# Patient Record
Sex: FEMALE | Race: WHITE | NOT HISPANIC OR LATINO | Employment: FULL TIME | URBAN - METROPOLITAN AREA
[De-identification: names, ages, dates, MRNs, and addresses within clinical notes are randomized per-mention and may not be internally consistent; named-entity substitution may affect disease eponyms.]

---

## 2018-10-09 ENCOUNTER — APPOINTMENT (OUTPATIENT)
Dept: RADIOLOGY | Age: 38
End: 2018-10-09
Payer: COMMERCIAL

## 2018-10-09 ENCOUNTER — TRANSCRIBE ORDERS (OUTPATIENT)
Dept: ADMINISTRATIVE | Age: 38
End: 2018-10-09

## 2018-10-09 DIAGNOSIS — R05.9 COUGH: ICD-10-CM

## 2018-10-09 DIAGNOSIS — R50.9 HYPERTHERMIA-INDUCED DEFECT: ICD-10-CM

## 2018-10-09 DIAGNOSIS — R50.9 HYPERTHERMIA-INDUCED DEFECT: Primary | ICD-10-CM

## 2018-10-09 PROCEDURE — 71046 X-RAY EXAM CHEST 2 VIEWS: CPT

## 2019-04-16 ENCOUNTER — HOSPITAL ENCOUNTER (EMERGENCY)
Facility: HOSPITAL | Age: 39
Discharge: HOME | End: 2019-04-16
Attending: EMERGENCY MEDICINE | Admitting: EMERGENCY MEDICINE
Payer: OTHER MISCELLANEOUS

## 2019-04-16 VITALS
BODY MASS INDEX: 39.06 KG/M2 | HEART RATE: 89 BPM | WEIGHT: 200 LBS | RESPIRATION RATE: 16 BRPM | DIASTOLIC BLOOD PRESSURE: 82 MMHG | SYSTOLIC BLOOD PRESSURE: 130 MMHG | OXYGEN SATURATION: 99 % | TEMPERATURE: 98 F

## 2019-04-16 DIAGNOSIS — S39.012A LUMBAR STRAIN, INITIAL ENCOUNTER: ICD-10-CM
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
## 2019-04-16 DIAGNOSIS — S16.1XXA STRAIN OF NECK MUSCLE, INITIAL ENCOUNTER: ICD-10-CM

## 2019-04-16 PROCEDURE — 99283 EMERGENCY DEPT VISIT LOW MDM: CPT

## 2019-04-16 PROCEDURE — 99282 EMERGENCY DEPT VISIT SF MDM: CPT | Performed by: EMERGENCY MEDICINE

## 2019-05-26 ENCOUNTER — HOSPITAL ENCOUNTER (EMERGENCY)
Facility: HOSPITAL | Age: 39
Discharge: HOME | End: 2019-05-26
Attending: EMERGENCY MEDICINE | Admitting: EMERGENCY MEDICINE
Payer: COMMERCIAL

## 2019-05-26 VITALS
OXYGEN SATURATION: 98 % | SYSTOLIC BLOOD PRESSURE: 116 MMHG | TEMPERATURE: 98.1 F | RESPIRATION RATE: 16 BRPM | DIASTOLIC BLOOD PRESSURE: 64 MMHG | HEART RATE: 78 BPM

## 2019-05-26 DIAGNOSIS — K31.84 GASTROPARESIS: Primary | ICD-10-CM

## 2019-05-26 DIAGNOSIS — R11.2 NAUSEA & VOMITING: ICD-10-CM

## 2019-05-26 LAB
ALBUMIN SERPL BCP-MCNC: 3.2 G/DL (ref 3.5–5)
ALP SERPL-CCNC: 94 U/L (ref 46–116)
ALT SERPL W P-5'-P-CCNC: 20 U/L (ref 12–78)
ANION GAP SERPL CALCULATED.3IONS-SCNC: 9 MMOL/L (ref 4–13)
AST SERPL W P-5'-P-CCNC: 14 U/L (ref 5–45)
BASOPHILS # BLD AUTO: 0.04 THOUSANDS/ΜL (ref 0–0.1)
BASOPHILS NFR BLD AUTO: 0 % (ref 0–1)
BILIRUB SERPL-MCNC: 0.3 MG/DL (ref 0.2–1)
BUN SERPL-MCNC: 12 MG/DL (ref 5–25)
CALCIUM SERPL-MCNC: 8.6 MG/DL (ref 8.3–10.1)
CHLORIDE SERPL-SCNC: 107 MMOL/L (ref 100–108)
CO2 SERPL-SCNC: 23 MMOL/L (ref 21–32)
CREAT SERPL-MCNC: 0.69 MG/DL (ref 0.6–1.3)
EOSINOPHIL # BLD AUTO: 0.1 THOUSAND/ΜL (ref 0–0.61)
EOSINOPHIL NFR BLD AUTO: 1 % (ref 0–6)
ERYTHROCYTE [DISTWIDTH] IN BLOOD BY AUTOMATED COUNT: 13.5 % (ref 11.6–15.1)
EXT PREG TEST URINE: NEGATIVE
GFR SERPL CREATININE-BSD FRML MDRD: 112 ML/MIN/1.73SQ M
GLUCOSE SERPL-MCNC: 78 MG/DL (ref 65–140)
HCT VFR BLD AUTO: 38.8 % (ref 34.8–46.1)
HGB BLD-MCNC: 12.9 G/DL (ref 11.5–15.4)
IMM GRANULOCYTES # BLD AUTO: 0.04 THOUSAND/UL (ref 0–0.2)
IMM GRANULOCYTES NFR BLD AUTO: 0 % (ref 0–2)
LIPASE SERPL-CCNC: 105 U/L (ref 73–393)
LYMPHOCYTES # BLD AUTO: 1.82 THOUSANDS/ΜL (ref 0.6–4.47)
LYMPHOCYTES NFR BLD AUTO: 16 % (ref 14–44)
MCH RBC QN AUTO: 29.1 PG (ref 26.8–34.3)
MCHC RBC AUTO-ENTMCNC: 33.2 G/DL (ref 31.4–37.4)
MCV RBC AUTO: 88 FL (ref 82–98)
MONOCYTES # BLD AUTO: 0.77 THOUSAND/ΜL (ref 0.17–1.22)
MONOCYTES NFR BLD AUTO: 7 % (ref 4–12)
NEUTROPHILS # BLD AUTO: 8.42 THOUSANDS/ΜL (ref 1.85–7.62)
NEUTS SEG NFR BLD AUTO: 76 % (ref 43–75)
NRBC BLD AUTO-RTO: 0 /100 WBCS
PLATELET # BLD AUTO: 290 THOUSANDS/UL (ref 149–390)
PMV BLD AUTO: 9.4 FL (ref 8.9–12.7)
POTASSIUM SERPL-SCNC: 3.5 MMOL/L (ref 3.5–5.3)
PROT SERPL-MCNC: 6.9 G/DL (ref 6.4–8.2)
RBC # BLD AUTO: 4.43 MILLION/UL (ref 3.81–5.12)
SODIUM SERPL-SCNC: 139 MMOL/L (ref 136–145)
WBC # BLD AUTO: 11.19 THOUSAND/UL (ref 4.31–10.16)

## 2019-05-26 PROCEDURE — 80053 COMPREHEN METABOLIC PANEL: CPT | Performed by: EMERGENCY MEDICINE

## 2019-05-26 PROCEDURE — 85025 COMPLETE CBC W/AUTO DIFF WBC: CPT | Performed by: EMERGENCY MEDICINE

## 2019-05-26 PROCEDURE — 83690 ASSAY OF LIPASE: CPT | Performed by: EMERGENCY MEDICINE

## 2019-05-26 PROCEDURE — 96374 THER/PROPH/DIAG INJ IV PUSH: CPT

## 2019-05-26 PROCEDURE — 81025 URINE PREGNANCY TEST: CPT | Performed by: EMERGENCY MEDICINE

## 2019-05-26 PROCEDURE — 99284 EMERGENCY DEPT VISIT MOD MDM: CPT

## 2019-05-26 PROCEDURE — 36415 COLL VENOUS BLD VENIPUNCTURE: CPT | Performed by: EMERGENCY MEDICINE

## 2019-05-26 PROCEDURE — 96361 HYDRATE IV INFUSION ADD-ON: CPT

## 2019-05-26 PROCEDURE — 99284 EMERGENCY DEPT VISIT MOD MDM: CPT | Performed by: EMERGENCY MEDICINE

## 2019-05-26 RX ORDER — ALBUTEROL SULFATE 90 UG/1
2 AEROSOL, METERED RESPIRATORY (INHALATION) AS NEEDED
Status: ON HOLD | COMMUNITY
End: 2021-06-27 | Stop reason: CLARIF

## 2019-05-26 RX ORDER — METOCLOPRAMIDE 5 MG/1
5 TABLET ORAL 3 TIMES DAILY PRN
COMMUNITY
Start: 2018-11-25

## 2019-05-26 RX ORDER — DESLORATADINE 5 MG/1
10 TABLET, ORALLY DISINTEGRATING ORAL DAILY
COMMUNITY
End: 2022-05-06 | Stop reason: HOSPADM

## 2019-05-26 RX ORDER — METOCLOPRAMIDE HYDROCHLORIDE 5 MG/ML
5 INJECTION INTRAMUSCULAR; INTRAVENOUS ONCE
Status: COMPLETED | OUTPATIENT
Start: 2019-05-26 | End: 2019-05-26

## 2019-05-26 RX ADMIN — SODIUM CHLORIDE 1000 ML: 0.9 INJECTION, SOLUTION INTRAVENOUS at 13:53

## 2019-05-26 RX ADMIN — METOCLOPRAMIDE 5 MG: 5 INJECTION, SOLUTION INTRAMUSCULAR; INTRAVENOUS at 14:23

## 2019-12-30 ENCOUNTER — HOSPITAL ENCOUNTER (OUTPATIENT)
Dept: RADIOLOGY | Facility: HOSPITAL | Age: 39
Discharge: HOME | End: 2019-12-30
Attending: PODIATRIST
Payer: COMMERCIAL

## 2019-12-30 ENCOUNTER — TRANSCRIBE ORDERS (OUTPATIENT)
Dept: ADMINISTRATIVE | Facility: HOSPITAL | Age: 39
End: 2019-12-30

## 2019-12-30 DIAGNOSIS — M79.672 LEFT FOOT PAIN: Primary | ICD-10-CM

## 2019-12-30 DIAGNOSIS — M79.672 LEFT FOOT PAIN: ICD-10-CM

## 2019-12-30 PROCEDURE — 73630 X-RAY EXAM OF FOOT: CPT

## 2020-08-03 ENCOUNTER — TRANSCRIBE ORDERS (OUTPATIENT)
Dept: ADMINISTRATIVE | Age: 40
End: 2020-08-03

## 2020-08-03 ENCOUNTER — APPOINTMENT (OUTPATIENT)
Dept: LAB | Age: 40
End: 2020-08-03
Payer: COMMERCIAL

## 2020-08-03 DIAGNOSIS — Z01.84 ENCOUNTER FOR ANTIBODY RESPONSE EXAMINATION: ICD-10-CM

## 2020-08-03 DIAGNOSIS — Z01.84 ENCOUNTER FOR ANTIBODY RESPONSE EXAMINATION: Primary | ICD-10-CM

## 2020-08-03 LAB
HBV SURFACE AB SER-ACNC: 7.51 MIU/ML
RUBV IGG SERPL IA-ACNC: 94.4 IU/ML

## 2020-08-03 PROCEDURE — 86762 RUBELLA ANTIBODY: CPT

## 2020-08-03 PROCEDURE — 86765 RUBEOLA ANTIBODY: CPT

## 2020-08-03 PROCEDURE — 36415 COLL VENOUS BLD VENIPUNCTURE: CPT

## 2020-08-03 PROCEDURE — 86706 HEP B SURFACE ANTIBODY: CPT

## 2020-08-03 PROCEDURE — 86735 MUMPS ANTIBODY: CPT

## 2020-08-03 PROCEDURE — 86787 VARICELLA-ZOSTER ANTIBODY: CPT

## 2020-08-04 LAB — VZV IGG SER IA-ACNC: NORMAL

## 2020-08-06 LAB
MEV IGG SER QL: NORMAL
MUV IGG SER QL: ABNORMAL

## 2020-12-17 ENCOUNTER — APPOINTMENT (OUTPATIENT)
Dept: URGENT CARE | Age: 40
End: 2020-12-17
Payer: OTHER MISCELLANEOUS

## 2020-12-17 PROCEDURE — 99213 OFFICE O/P EST LOW 20 MIN: CPT | Performed by: PHYSICIAN ASSISTANT

## 2020-12-28 ENCOUNTER — APPOINTMENT (OUTPATIENT)
Dept: URGENT CARE | Age: 40
End: 2020-12-28
Payer: OTHER MISCELLANEOUS

## 2020-12-28 PROCEDURE — 99213 OFFICE O/P EST LOW 20 MIN: CPT | Performed by: PHYSICIAN ASSISTANT

## 2021-01-08 ENCOUNTER — IMMUNIZATIONS (OUTPATIENT)
Dept: FAMILY MEDICINE CLINIC | Facility: HOSPITAL | Age: 41
End: 2021-01-08

## 2021-01-08 DIAGNOSIS — Z23 ENCOUNTER FOR IMMUNIZATION: ICD-10-CM

## 2021-01-08 PROCEDURE — 91300 SARS-COV-2 / COVID-19 MRNA VACCINE (PFIZER-BIONTECH) 30 MCG: CPT

## 2021-01-08 PROCEDURE — 0001A SARS-COV-2 / COVID-19 MRNA VACCINE (PFIZER-BIONTECH) 30 MCG: CPT

## 2021-01-27 ENCOUNTER — IMMUNIZATIONS (OUTPATIENT)
Dept: FAMILY MEDICINE CLINIC | Facility: HOSPITAL | Age: 41
End: 2021-01-27

## 2021-01-27 DIAGNOSIS — Z23 ENCOUNTER FOR IMMUNIZATION: Primary | ICD-10-CM

## 2021-01-27 PROCEDURE — 91300 SARS-COV-2 / COVID-19 MRNA VACCINE (PFIZER-BIONTECH) 30 MCG: CPT

## 2021-01-27 PROCEDURE — 0002A SARS-COV-2 / COVID-19 MRNA VACCINE (PFIZER-BIONTECH) 30 MCG: CPT

## 2021-04-04 ENCOUNTER — HOSPITAL ENCOUNTER (EMERGENCY)
Facility: HOSPITAL | Age: 41
Discharge: HOME | End: 2021-04-04
Attending: EMERGENCY MEDICINE
Payer: COMMERCIAL

## 2021-04-04 ENCOUNTER — APPOINTMENT (EMERGENCY)
Dept: RADIOLOGY | Facility: HOSPITAL | Age: 41
End: 2021-04-04
Payer: COMMERCIAL

## 2021-04-04 VITALS
HEIGHT: 59 IN | HEART RATE: 97 BPM | SYSTOLIC BLOOD PRESSURE: 130 MMHG | BODY MASS INDEX: 49.19 KG/M2 | RESPIRATION RATE: 18 BRPM | OXYGEN SATURATION: 97 % | WEIGHT: 244 LBS | TEMPERATURE: 98.8 F | DIASTOLIC BLOOD PRESSURE: 78 MMHG

## 2021-04-04 DIAGNOSIS — M25.572 LEFT ANKLE PAIN: ICD-10-CM

## 2021-04-04 DIAGNOSIS — M25.561 ACUTE BILATERAL KNEE PAIN: ICD-10-CM

## 2021-04-04 DIAGNOSIS — M25.562 ACUTE BILATERAL KNEE PAIN: ICD-10-CM
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
## 2021-04-04 PROCEDURE — 99283 EMERGENCY DEPT VISIT LOW MDM: CPT

## 2021-04-04 PROCEDURE — 99285 EMERGENCY DEPT VISIT HI MDM: CPT | Performed by: PHYSICIAN ASSISTANT

## 2021-04-04 PROCEDURE — 73521 X-RAY EXAM HIPS BI 2 VIEWS: CPT

## 2021-04-04 PROCEDURE — 96372 THER/PROPH/DIAG INJ SC/IM: CPT

## 2021-04-04 PROCEDURE — 73564 X-RAY EXAM KNEE 4 OR MORE: CPT

## 2021-04-04 PROCEDURE — 73610 X-RAY EXAM OF ANKLE: CPT

## 2021-04-04 RX ORDER — KETOROLAC TROMETHAMINE 30 MG/ML
30 INJECTION, SOLUTION INTRAMUSCULAR; INTRAVENOUS ONCE
Status: COMPLETED | OUTPATIENT
Start: 2021-04-04 | End: 2021-04-04

## 2021-04-04 RX ORDER — OXYCODONE HCL 5 MG/5 ML
5 SOLUTION, ORAL ORAL EVERY 8 HOURS PRN
Qty: 45 ML | Refills: 0 | Status: SHIPPED | OUTPATIENT
Start: 2021-04-04 | End: 2021-04-14

## 2021-04-04 RX ORDER — OXYCODONE HCL 5 MG/5 ML
5 SOLUTION, ORAL ORAL ONCE
Status: COMPLETED | OUTPATIENT
Start: 2021-04-04 | End: 2021-04-04

## 2021-04-04 RX ORDER — TOPIRAMATE 100 MG/1
100 TABLET, FILM COATED ORAL DAILY
COMMUNITY
End: 2021-07-10

## 2021-04-04 RX ADMIN — OXYCODONE HYDROCHLORIDE 5 MG: 5 SOLUTION ORAL at 20:01

## 2021-04-04 RX ADMIN — KETOROLAC TROMETHAMINE 30 MG: 30 INJECTION, SOLUTION INTRAMUSCULAR at 20:01

## 2021-04-05 NOTE — DISCHARGE INSTRUCTIONS
Encourage rest, ice, compression, elevation  Please call your Orthopedic provider tomorrow to be seen in follow up  Return immediately to the emergency department if you develop any new or worsening symptoms as discussed

## 2021-04-05 NOTE — ED PROVIDER NOTES
History  Chief Complaint   Patient presents with   Abhijeet Donohue     s/p fall this am, states knees hit the concrete, c/o b/l leg pain left worse than right     Amalia Chin is a pleasant and well-appearing 68-year-old female arriving ambulatory to the emergency department accompanied by spouse for evaluation of injuries sustained status post mechanical fall earlier this morning  The patient states that while at the gas station, she accidentally tripped over the gas pump with her right leg, landing directly on her flexed knees  She presents with small abrasion to the right knee, tetanus is current  The patient denies lower back pain, lower extremity numbness, weakness, or paralysis  She has been ambulatory since the fall this morning though admits that weight bearing and ambulation exacerbate her pain  The patient states that she currently follows with an orthopedic provider at [facility] for history of osteoarthritis of both knees, receiving steroid injections in her knees  She states that she had initially planned to call her orthopedic provider tomorrow morning to schedule a follow-up appointment for these complaints; however, she states that she was unable to tolerate her pain this evening  She denies prior surgeries to her hips/knees/ankles  She states that she has taken Tylenol, and ibuprofen, as well as applying ice with minimal improvement  She denies head strike or loc from fall, no anti-coagulant use        History provided by:  Patient  Fall  Mechanism of injury: fall    Injury location:  Leg  Leg injury location:  L knee and R knee  Arrived directly from scene: no    Fall:     Fall occurred:  Walking    Point of impact:  Knees  Tetanus status:  Up to date  Prior to arrival data:     Patient ambulatory at scene: no      Loss of consciousness: no      Amnesic to event: no    Associated symptoms: no abdominal pain, no back pain, no headaches, no loss of consciousness, no nausea, no neck pain and no vomiting Prior to Admission Medications   Prescriptions Last Dose Informant Patient Reported? Taking? Cholecalciferol 2000 units CAPS 2021 at Unknown time  Yes Yes   Sig: Take 1 capsule by mouth daily   albuterol (PROAIR HFA) 90 mcg/act inhaler Not Taking at Unknown time  Yes No   Sig: Inhale 2 puffs as needed   desloratadine (CLARINEX REDITAB) 5 MG disintegrating tablet 2021 at Unknown time  Yes Yes   Sig: Take 5 mg by mouth daily   fluticasone (FLONASE SENSIMIST) 27 5 MCG/SPRAY nasal spray 4/3/2021 at Unknown time  Yes Yes   Si spray into each nostril daily   lansoprazole (PREVACID SOLUTAB) 30 mg disintegrating tablet 2021 at Unknown time  Yes Yes   Sig: Take 30 mg by mouth daily   metoclopramide (REGLAN) 5 mg tablet 2021 at Unknown time  Yes Yes   Sig: Take 5 mg by mouth as needed   topiramate (TOPAMAX) 100 mg tablet 4/3/2021 at Unknown time  Yes Yes   Sig: Take 100 mg by mouth 2 (two) times a day      Facility-Administered Medications: None       Past Medical History:   Diagnosis Date    Gastroparesis     Pseudotumor cerebri        Past Surgical History:   Procedure Laterality Date    CARPAL TUNNEL RELEASE Right        History reviewed  No pertinent family history  I have reviewed and agree with the history as documented  E-Cigarette/Vaping    E-Cigarette Use Never User      E-Cigarette/Vaping Substances     Social History     Tobacco Use    Smoking status: Never Smoker    Smokeless tobacco: Never Used   Substance Use Topics    Alcohol use: Never     Frequency: Never    Drug use: Never       Review of Systems   Constitutional: Negative for chills and fever  Respiratory: Negative for shortness of breath  Gastrointestinal: Negative for abdominal pain, nausea and vomiting  Musculoskeletal: Positive for arthralgias and myalgias  Negative for back pain and neck pain  Hip pain, Bilateral knee pain, left ankle pain   Skin: Positive for wound     Neurological: Negative for loss of consciousness, weakness and headaches  All other systems reviewed and are negative  Physical Exam  Physical Exam  Vitals signs and nursing note reviewed  Constitutional:       General: She is not in acute distress  Appearance: Normal appearance  She is well-developed  She is obese  She is not ill-appearing, toxic-appearing or diaphoretic  HENT:      Head: Normocephalic and atraumatic  Eyes:      Conjunctiva/sclera: Conjunctivae normal    Neck:      Musculoskeletal: Normal range of motion and neck supple  Cardiovascular:      Rate and Rhythm: Normal rate and regular rhythm  Heart sounds: Normal heart sounds  Pulmonary:      Effort: Pulmonary effort is normal  No respiratory distress  Breath sounds: Normal breath sounds  No wheezing  Musculoskeletal:      Right lower leg: No edema  Left lower leg: No edema  Comments: Pelvis stable  Symmetric strength in both lower extremities  No appreciable motor or sensory deficits  No obvious deformity on inspection of bilateral lower extremities  There is mild edema and ecchymosis overlying both knees  There is no significant joint laxity  I am unable to range both knees likely secondary to pain  There is mild edema along the lateral malleolus of the left ankle with minimal tenderness to palpation, patient states this is chronic from prior ankle sprains  PF/DF intact bilaterally  Capillary refill < 2 seconds  BLE neurovascularly intact  Skin:     General: Skin is warm and dry  Capillary Refill: Capillary refill takes less than 2 seconds  Findings: Abrasion (Mild superficial skin tear, anterior surface of right knee) present  Neurological:      General: No focal deficit present  Mental Status: She is alert  Mental status is at baseline  GCS: GCS eye subscore is 4  GCS verbal subscore is 5  GCS motor subscore is 6  Sensory: Sensation is intact  No sensory deficit  Motor: Motor function is intact  No weakness, tremor or abnormal muscle tone  Gait: Gait is intact  Vital Signs  ED Triage Vitals [04/04/21 1937]   Temperature Pulse Respirations Blood Pressure SpO2   98 8 °F (37 1 °C) 97 18 130/78 97 %      Temp Source Heart Rate Source Patient Position - Orthostatic VS BP Location FiO2 (%)   Oral Monitor -- -- --      Pain Score       Worst Possible Pain           Vitals:    04/04/21 1937   BP: 130/78   Pulse: 97         Visual Acuity      ED Medications  Medications   oxyCODONE (ROXICODONE) oral solution 5 mg (5 mg Oral Given 4/4/21 2001)   ketorolac (TORADOL) injection 30 mg (30 mg Intramuscular Given 4/4/21 2001)       Diagnostic Studies  Results Reviewed     None                 XR knee 4+ vw left injury    (Results Pending)   XR knee 4+ vw right injury    (Results Pending)   XR hips bilateral 2 vw w pelvis if performed    (Results Pending)   XR ankle 3+ views LEFT    (Results Pending)              Procedures  Procedures         ED Course                             SBIRT 20yo+      Most Recent Value   SBIRT (22 yo +)   In order to provide better care to our patients, we are screening all of our patients for alcohol and drug use  Would it be okay to ask you these screening questions? Yes Filed at: 04/04/2021 1958   Initial Alcohol Screen: US AUDIT-C    1  How often do you have a drink containing alcohol?  0 Filed at: 04/04/2021 1958   2  How many drinks containing alcohol do you have on a typical day you are drinking? 0 Filed at: 04/04/2021 1958   3a  Male UNDER 65: How often do you have five or more drinks on one occasion? 0 Filed at: 04/04/2021 1958   3b  FEMALE Any Age, or MALE 65+: How often do you have 4 or more drinks on one occassion? 0 Filed at: 04/04/2021 1958   Audit-C Score  0 Filed at: 04/04/2021 1958   JOSE: How many times in the past year have you    Used an illegal drug or used a prescription medication for non-medical reasons?   Never Filed at: 04/04/2021 1958 MDM  Number of Diagnoses or Management Options  Acute bilateral knee pain: new and requires workup  Fall, initial encounter: new and requires workup  Left ankle pain: new and requires workup  Diagnosis management comments: This is a 70-year-old female arriving ambulatory to the emergency department for evaluation of injuries sustained status post mechanical fall earlier this morning  The patient had reportedly tripped over a gas pump with her right leg, landing on flexed knees  She denies head strike or loc, no anti-coagulant use  Tetanus current  States that she follows with orthopedics for osteoarthritis in both knees, denies prior surgical procedures to the hips/knees/ankles  She has been ambulatory since the fall though admits to exacerbation of pain with weight-bearing and ambulation  She denies any back pain, lower extremity numbness or weakness  Differential diagnosis includes but not limited to:  Rule out acute fracture/dislocation, sprain, strain, contusion, hematoma, abrasions    Initial ED plan:  X-rays, pain control    Final ED Assessment:  Vital signs stable on hospital arrival, examination as documented above  GCS 15, bilateral lower extremities are neurovascularly intact  X-rays independently reviewed and are without acute osseous abnormalities on my read  Patient made aware that official Radiology reads are pending and she will be notified if there are abnormal findings  Encouraged supportive measures including rest, ice, compression, and elevation  The patient states that with her history of gastroparesis she struggles with taking medications in pill form, requesting oral solutions  Advised liquid ibuprofen as needed for mild-moderate pain relief  Will discharge with short-term script for Roxicodone oral solution for severe/breakthrough pain, standard narcotic precautions given   Given potential for limited pharmacy availability of oral solution, paper script given to limit delay in availability to fill script  Patient encouraged to call her Orthopedic provider at [facility] tomorrow to schedule close follow up  Patient encouraged to return immediately with any new or worsening symptoms  Patient verbalized understanding and is agreeable with disposition plan  She is stable for discharge home at this time  The patient had ambulated from the department at time of discharge  Amount and/or Complexity of Data Reviewed  Tests in the radiology section of CPT®: ordered and reviewed  Review and summarize past medical records: yes  Independent visualization of images, tracings, or specimens: yes    Risk of Complications, Morbidity, and/or Mortality  Presenting problems: low  Diagnostic procedures: low  Management options: low    Patient Progress  Patient progress: stable      Disposition  Final diagnoses:   Fall, initial encounter   Acute bilateral knee pain   Left ankle pain     Time reflects when diagnosis was documented in both MDM as applicable and the Disposition within this note     Time User Action Codes Description Comment    4/4/2021  9:02 PM Amilcar Pereira Add Jeremy Gut  ZQGY] Fall, initial encounter     4/4/2021  9:02 PM Amilcar Pereira Add [P85 893,  M25 562] Acute bilateral knee pain     4/4/2021  9:02 PM Amilcar Pereira Add [D21 950] Left ankle pain       ED Disposition     ED Disposition Condition Date/Time Comment    Discharge Stable Sun Apr 4, 2021  9:02 PM Sweta Riley discharge to home/self care              Follow-up Information     Follow up With Specialties Details Why Contact Info Additional Information    Kanika Haas, DO Family Medicine  As needed [address]  [phone]       STEPHON Gonzalez [address] Emergency Department Emergency Medicine  If symptoms worsen [address] [facility],[address] [id number]  [address] [place] [address] Emergency Department, [address], [address]    Your Orthopedic Provider Call  Please call tomorrow to be seen in follow up            Discharge Medication List as of 4/4/2021  9:14 PM      START taking these medications    Details   oxyCODONE (ROXICODONE) 5 mg/5 mL solution Take 5 mL (5 mg total) by mouth every 8 (eight) hours as needed for moderate pain or severe pain for up to 10 daysMax Daily Amount: 15 mg, Starting Sun 4/4/2021, Until Wed 4/14/2021, Print         CONTINUE these medications which have NOT CHANGED    Details   Cholecalciferol 2000 units CAPS Take 1 capsule by mouth daily, Historical Med      desloratadine (CLARINEX REDITAB) 5 MG disintegrating tablet Take 5 mg by mouth daily, Historical Med      fluticasone (FLONASE SENSIMIST) 27 5 MCG/SPRAY nasal spray 1 spray into each nostril daily, Historical Med      lansoprazole (PREVACID SOLUTAB) 30 mg disintegrating tablet Take 30 mg by mouth daily, Starting Fri 11/9/2018, Historical Med      metoclopramide (REGLAN) 5 mg tablet Take 5 mg by mouth as needed, Starting Sun 11/25/2018, Historical Med      topiramate (TOPAMAX) 100 mg tablet Take 100 mg by mouth 2 (two) times a day, Historical Med      albuterol (PROAIR HFA) 90 mcg/act inhaler Inhale 2 puffs as needed, Historical Med           No discharge procedures on file      PDMP Review     None          ED Provider  Electronically Signed by           Kaylah Delgado PA-C  04/04/21 7089

## 2021-04-19 ENCOUNTER — TRANSCRIBE ORDERS (OUTPATIENT)
Dept: ADMINISTRATIVE | Age: 41
End: 2021-04-19

## 2021-04-19 ENCOUNTER — APPOINTMENT (OUTPATIENT)
Dept: RADIOLOGY | Age: 41
End: 2021-04-19
Payer: COMMERCIAL

## 2021-04-19 DIAGNOSIS — R06.6 HICCOUGH: ICD-10-CM

## 2021-04-19 DIAGNOSIS — R06.6 HICCOUGH: Primary | ICD-10-CM

## 2021-04-19 PROCEDURE — 71046 X-RAY EXAM CHEST 2 VIEWS: CPT

## 2021-06-25 ENCOUNTER — HOSPITAL ENCOUNTER (EMERGENCY)
Facility: HOSPITAL | Age: 41
End: 2021-06-26
Attending: EMERGENCY MEDICINE | Admitting: EMERGENCY MEDICINE
Payer: COMMERCIAL

## 2021-06-25 ENCOUNTER — APPOINTMENT (EMERGENCY)
Dept: CT IMAGING | Facility: HOSPITAL | Age: 41
End: 2021-06-25
Payer: COMMERCIAL

## 2021-06-25 VITALS
DIASTOLIC BLOOD PRESSURE: 68 MMHG | WEIGHT: 246.7 LBS | TEMPERATURE: 98 F | OXYGEN SATURATION: 98 % | RESPIRATION RATE: 18 BRPM | BODY MASS INDEX: 49.83 KG/M2 | SYSTOLIC BLOOD PRESSURE: 129 MMHG | HEART RATE: 97 BPM

## 2021-06-25 DIAGNOSIS — N20.0 NEPHROLITHIASIS: Primary | ICD-10-CM

## 2021-06-25 LAB
ALBUMIN SERPL BCP-MCNC: 3.9 G/DL (ref 3.4–4.8)
ALP SERPL-CCNC: 64.6 U/L (ref 35–140)
ALT SERPL W P-5'-P-CCNC: 14 U/L (ref 5–54)
AMORPH URATE CRY URNS QL MICRO: ABNORMAL /HPF
ANION GAP SERPL CALCULATED.3IONS-SCNC: 12 MMOL/L (ref 4–13)
AST SERPL W P-5'-P-CCNC: 15 U/L (ref 15–41)
BACTERIA UR QL AUTO: ABNORMAL /HPF
BASOPHILS # BLD AUTO: 0.04 THOUSANDS/ΜL (ref 0–0.1)
BASOPHILS NFR BLD AUTO: 0 % (ref 0–1)
BILIRUB SERPL-MCNC: 0.28 MG/DL (ref 0.3–1.2)
BILIRUB UR QL STRIP: NEGATIVE
BUN SERPL-MCNC: 20 MG/DL (ref 6–20)
CALCIUM SERPL-MCNC: 9.1 MG/DL (ref 8.4–10.2)
CHLORIDE SERPL-SCNC: 104 MMOL/L (ref 96–108)
CLARITY UR: ABNORMAL
CO2 SERPL-SCNC: 22 MMOL/L (ref 22–33)
COLOR UR: ABNORMAL
CREAT SERPL-MCNC: 0.78 MG/DL (ref 0.4–1.1)
EOSINOPHIL # BLD AUTO: 0.2 THOUSAND/ΜL (ref 0–0.61)
EOSINOPHIL NFR BLD AUTO: 1 % (ref 0–6)
ERYTHROCYTE [DISTWIDTH] IN BLOOD BY AUTOMATED COUNT: 13.9 % (ref 11.6–15.1)
EXT PREG TEST URINE: NEGATIVE
EXT. CONTROL ED NAV: NORMAL
GFR SERPL CREATININE-BSD FRML MDRD: 97 ML/MIN/1.73SQ M
GLUCOSE SERPL-MCNC: 109 MG/DL (ref 65–140)
GLUCOSE UR STRIP-MCNC: NEGATIVE MG/DL
HCT VFR BLD AUTO: 40.2 % (ref 34.8–46.1)
HGB BLD-MCNC: 13.3 G/DL (ref 11.5–15.4)
HGB UR QL STRIP.AUTO: ABNORMAL
IMM GRANULOCYTES # BLD AUTO: 0.11 THOUSAND/UL (ref 0–0.2)
IMM GRANULOCYTES NFR BLD AUTO: 1 % (ref 0–2)
KETONES UR STRIP-MCNC: NEGATIVE MG/DL
LEUKOCYTE ESTERASE UR QL STRIP: NEGATIVE
LIPASE SERPL-CCNC: 21 U/L (ref 13–60)
LYMPHOCYTES # BLD AUTO: 2.98 THOUSANDS/ΜL (ref 0.6–4.47)
LYMPHOCYTES NFR BLD AUTO: 20 % (ref 14–44)
MCH RBC QN AUTO: 27.8 PG (ref 26.8–34.3)
MCHC RBC AUTO-ENTMCNC: 33.1 G/DL (ref 31.4–37.4)
MCV RBC AUTO: 84 FL (ref 82–98)
MONOCYTES # BLD AUTO: 1.06 THOUSAND/ΜL (ref 0.17–1.22)
MONOCYTES NFR BLD AUTO: 7 % (ref 4–12)
NEUTROPHILS # BLD AUTO: 10.38 THOUSANDS/ΜL (ref 1.85–7.62)
NEUTS SEG NFR BLD AUTO: 71 % (ref 43–75)
NITRITE UR QL STRIP: NEGATIVE
NON-SQ EPI CELLS URNS QL MICRO: ABNORMAL /HPF
PH UR STRIP.AUTO: 5.5 [PH]
PLATELET # BLD AUTO: 331 THOUSANDS/UL (ref 149–390)
PMV BLD AUTO: 9.7 FL (ref 8.9–12.7)
POTASSIUM SERPL-SCNC: 3.8 MMOL/L (ref 3.5–5)
PROT SERPL-MCNC: 7 G/DL (ref 6.4–8.3)
PROT UR STRIP-MCNC: ABNORMAL MG/DL
RBC # BLD AUTO: 4.78 MILLION/UL (ref 3.81–5.12)
RBC #/AREA URNS AUTO: ABNORMAL /HPF
SODIUM SERPL-SCNC: 138 MMOL/L (ref 133–145)
SP GR UR STRIP.AUTO: >=1.03 (ref 1–1.03)
UROBILINOGEN UR QL STRIP.AUTO: 0.2 E.U./DL
WBC # BLD AUTO: 14.77 THOUSAND/UL (ref 4.31–10.16)
WBC #/AREA URNS AUTO: ABNORMAL /HPF

## 2021-06-25 PROCEDURE — 96375 TX/PRO/DX INJ NEW DRUG ADDON: CPT

## 2021-06-25 PROCEDURE — 74176 CT ABD & PELVIS W/O CONTRAST: CPT

## 2021-06-25 PROCEDURE — 99285 EMERGENCY DEPT VISIT HI MDM: CPT | Performed by: EMERGENCY MEDICINE

## 2021-06-25 PROCEDURE — 99285 EMERGENCY DEPT VISIT HI MDM: CPT

## 2021-06-25 PROCEDURE — 96365 THER/PROPH/DIAG IV INF INIT: CPT

## 2021-06-25 PROCEDURE — 81003 URINALYSIS AUTO W/O SCOPE: CPT | Performed by: EMERGENCY MEDICINE

## 2021-06-25 PROCEDURE — 81025 URINE PREGNANCY TEST: CPT | Performed by: EMERGENCY MEDICINE

## 2021-06-25 PROCEDURE — 36415 COLL VENOUS BLD VENIPUNCTURE: CPT | Performed by: EMERGENCY MEDICINE

## 2021-06-25 PROCEDURE — 85025 COMPLETE CBC W/AUTO DIFF WBC: CPT | Performed by: EMERGENCY MEDICINE

## 2021-06-25 PROCEDURE — 96361 HYDRATE IV INFUSION ADD-ON: CPT

## 2021-06-25 PROCEDURE — 81001 URINALYSIS AUTO W/SCOPE: CPT | Performed by: EMERGENCY MEDICINE

## 2021-06-25 PROCEDURE — 80053 COMPREHEN METABOLIC PANEL: CPT | Performed by: EMERGENCY MEDICINE

## 2021-06-25 PROCEDURE — 96366 THER/PROPH/DIAG IV INF ADDON: CPT

## 2021-06-25 PROCEDURE — 96376 TX/PRO/DX INJ SAME DRUG ADON: CPT

## 2021-06-25 PROCEDURE — 83690 ASSAY OF LIPASE: CPT | Performed by: EMERGENCY MEDICINE

## 2021-06-25 RX ORDER — LEVOFLOXACIN 5 MG/ML
500 INJECTION, SOLUTION INTRAVENOUS ONCE
Status: COMPLETED | OUTPATIENT
Start: 2021-06-25 | End: 2021-06-25

## 2021-06-25 RX ORDER — ONDANSETRON 2 MG/ML
4 INJECTION INTRAMUSCULAR; INTRAVENOUS ONCE
Status: COMPLETED | OUTPATIENT
Start: 2021-06-25 | End: 2021-06-25

## 2021-06-25 RX ORDER — KETOROLAC TROMETHAMINE 30 MG/ML
15 INJECTION, SOLUTION INTRAMUSCULAR; INTRAVENOUS ONCE
Status: COMPLETED | OUTPATIENT
Start: 2021-06-25 | End: 2021-06-25

## 2021-06-25 RX ORDER — MORPHINE SULFATE 4 MG/ML
4 INJECTION, SOLUTION INTRAMUSCULAR; INTRAVENOUS ONCE
Status: COMPLETED | OUTPATIENT
Start: 2021-06-25 | End: 2021-06-25

## 2021-06-25 RX ADMIN — SODIUM CHLORIDE 1000 ML: 0.9 INJECTION, SOLUTION INTRAVENOUS at 14:18

## 2021-06-25 RX ADMIN — ONDANSETRON 4 MG: 2 INJECTION INTRAMUSCULAR; INTRAVENOUS at 14:20

## 2021-06-25 RX ADMIN — KETOROLAC TROMETHAMINE 15 MG: 30 INJECTION, SOLUTION INTRAMUSCULAR at 14:19

## 2021-06-25 RX ADMIN — MORPHINE SULFATE 4 MG: 4 INJECTION INTRAVENOUS at 20:49

## 2021-06-25 RX ADMIN — LEVOFLOXACIN 500 MG: 5 INJECTION, SOLUTION INTRAVENOUS at 17:17

## 2021-06-25 RX ADMIN — MORPHINE SULFATE 4 MG: 4 INJECTION INTRAVENOUS at 15:28

## 2021-06-25 RX ADMIN — SODIUM CHLORIDE 1000 ML: 0.9 INJECTION, SOLUTION INTRAVENOUS at 20:50

## 2021-06-25 NOTE — ED NOTES
Transfer Information:  Pick-up with [facility] @ [address] [facility]  Dr Nehemiah Moss accepting  Call report: [address] [place], RN  06/25/21 8605

## 2021-06-25 NOTE — EMTALA/ACUTE CARE TRANSFER
[facility] EMERGENCY DEPARTMENT  [address] [facility] [address]  Dept: [phone]      EMTALA TRANSFER CONSENT    NAME Benito Mello                                         1980                              MRN [id number]    I have been informed of my rights regarding examination, treatment, and transfer   by Dr Fracisco Yee DO    Benefits:      Risks: Potential for delay in receiving treatment, Potential deterioration of medical condition, Loss of IV, Increased discomfort during transfer      Consent for Transfer:  I acknowledge that my medical condition has been evaluated and explained to me by the emergency department physician or other qualified medical person and/or my attending physician, who has recommended that I be transferred to the service of  Accepting Physician: Dr Gillian Connelly at [address] Name, [address] : Jaimee Palomares  The above potential benefits of such transfer, the potential risks associated with such transfer, and the probable risks of not being transferred have been explained to me, and I fully understand them  The doctor has explained that, in my case, the benefits of transfer outweigh the risks  I agree to be transferred  I authorize the performance of emergency medical procedures and treatments upon me in both transit and upon arrival at the receiving facility  Additionally, I authorize the release of any and all medical records to the receiving facility and request they be transported with me, if possible  I understand that the safest mode of transportation during a medical emergency is an ambulance and that the Hospital advocates the use of this mode of transport  Risks of traveling to the receiving facility by car, including absence of medical control, life sustaining equipment, such as oxygen, and medical personnel has been explained to me and I fully understand them      (SOFIYA CORRECT BOX BELOW)  [  ]  I consent to the stated transfer and to be transported by ambulance/helicopter  [  ]  I consent to the stated transfer, but refuse transportation by ambulance and accept full responsibility for my transportation by car  I understand the risks of non-ambulance transfers and I exonerate the Hospital and its staff from any deterioration in my condition that results from this refusal     X___________________________________________    DATE  21  TIME________  Signature of patient or legally responsible individual signing on patient behalf           RELATIONSHIP TO PATIENT_________________________          Provider Certification    NAME Eugene Brand                                         1980                              MRN [id number]    A medical screening exam was performed on the above named patient  Based on the examination:    Condition Necessitating Transfer The encounter diagnosis was Nephrolithiasis  Patient Condition: The patient has been stabilized such that within reasonable medical probability, no material deterioration of the patient condition or the condition of the unborn child(keith) is likely to result from the transfer    Reason for Transfer: Level of Care needed not available at this facility    Transfer Requirements: [address]   · Space available and qualified personnel available for treatment as acknowledged by [facility]  · Agreed to accept transfer and to provide appropriate medical treatment as acknowledged by       Dr Rodrigo Eubanks  · Appropriate medical records of the examination and treatment of the patient are provided at the time of transfer   [address], [address] _______  · Transfer will be performed by qualified personnel from    and appropriate transfer equipment as required, including the use of necessary and appropriate life support measures      Provider Certification: I have examined the patient and explained the following risks and benefits of being transferred/refusing transfer to the patient/family:  General risk, such as traffic hazards, adverse weather conditions, rough terrain or turbulence, possible failure of equipment (including vehicle or aircraft), or consequences of actions of persons outside the control of the transport personnel, Unanticipated needs of medical equipment and personnel during transport, Risk of worsening condition, The possibility of a transport vehicle being unavailable      Based on these reasonable risks and benefits to the patient and/or the unborn child(keith), and based upon the information available at the time of the patients examination, I certify that the medical benefits reasonably to be expected from the provision of appropriate medical treatments at another medical facility outweigh the increasing risks, if any, to the individuals medical condition, and in the case of labor to the unborn child, from effecting the transfer      X____________________________________________ DATE 06/25/21        TIME_______      ORIGINAL - SEND TO MEDICAL RECORDS   COPY - SEND WITH PATIENT DURING TRANSFER

## 2021-06-25 NOTE — ED NOTES
2/15/2019    Ronald Ramirez    [address] [address]          CERTIFICATE OF RETURN TO WORK      This is to certify that Ronald Ramirez has been under my care on 2/15/2019 and can return to regular work on 2/19/19.                          Signature:__________________________________________2/15/2019         Artem Bar MD    Family Medicine  [facility]  [address] [address] [phone]         New pick-up time of 0100       Carlos Buenrostro RN  06/25/21 1944

## 2021-06-25 NOTE — QUICK NOTE
Asked to evaluate for flank pain on Left  CT shows 5-6mm left proximal ureteral stone and mild hydro  UA is innumerable bacteria  No prior stones  On topamax  Morbid obesity and A1c of 5 8%  Afebrile  Needs to have a stent placed but can not have done at [place] since OR closed already for the weekend  Will transfer to [facility] and arrange for a stent placement tomorrow  Advised there is no bed for a few hours  Willing to wait  Has gotten levaquin for UTI  If shows signs of sepsis will need transfer to anywhere the is an open bed for her and an OR for stent placement  ER agrees to hold her for now  Then will effect transfer  Addendum: 2045 Not left  yet  Was intending to see and place on schedule tonight for tomorrow    Will have to see in AM

## 2021-06-26 ENCOUNTER — ANESTHESIA (OUTPATIENT)
Dept: PERIOP | Facility: HOSPITAL | Age: 41
End: 2021-06-26
Payer: COMMERCIAL

## 2021-06-26 ENCOUNTER — APPOINTMENT (OUTPATIENT)
Dept: RADIOLOGY | Facility: HOSPITAL | Age: 41
End: 2021-06-26
Payer: COMMERCIAL

## 2021-06-26 ENCOUNTER — HOSPITAL ENCOUNTER (OUTPATIENT)
Facility: HOSPITAL | Age: 41
Setting detail: OBSERVATION
LOS: 1 days | Discharge: HOME | End: 2021-06-27
Attending: INTERNAL MEDICINE | Admitting: INTERNAL MEDICINE
Payer: COMMERCIAL

## 2021-06-26 ENCOUNTER — ANESTHESIA EVENT (OUTPATIENT)
Dept: PERIOP | Facility: HOSPITAL | Age: 41
End: 2021-06-26
Payer: COMMERCIAL

## 2021-06-26 DIAGNOSIS — N20.1 URETERAL STONE: Primary | ICD-10-CM

## 2021-06-26 DIAGNOSIS — N20.1 CALCULUS OF URETER: ICD-10-CM

## 2021-06-26 PROBLEM — G47.30 SLEEP APNEA: Status: ACTIVE | Noted: 2021-06-26

## 2021-06-26 PROBLEM — E66.01 CLASS 3 SEVERE OBESITY IN ADULT (HCC): Status: ACTIVE | Noted: 2021-06-26

## 2021-06-26 PROBLEM — G43.909 MIGRAINES: Status: ACTIVE | Noted: 2021-06-26

## 2021-06-26 PROBLEM — G93.2 PSEUDOTUMOR: Status: ACTIVE | Noted: 2021-06-26

## 2021-06-26 PROBLEM — K31.84 GASTROPARESIS: Status: ACTIVE | Noted: 2021-06-26

## 2021-06-26 PROCEDURE — 94660 CPAP INITIATION&MGMT: CPT

## 2021-06-26 PROCEDURE — G0379 DIRECT REFER HOSPITAL OBSERV: HCPCS

## 2021-06-26 PROCEDURE — 94760 N-INVAS EAR/PLS OXIMETRY 1: CPT

## 2021-06-26 PROCEDURE — C2617 STENT, NON-COR, TEM W/O DEL: HCPCS | Performed by: UROLOGY

## 2021-06-26 PROCEDURE — C1758 CATHETER, URETERAL: HCPCS | Performed by: UROLOGY

## 2021-06-26 PROCEDURE — 99220 PR INITIAL OBSERVATION CARE/DAY 70 MINUTES: CPT | Performed by: INTERNAL MEDICINE

## 2021-06-26 PROCEDURE — 74420 UROGRAPHY RTRGR +-KUB: CPT

## 2021-06-26 PROCEDURE — C1894 INTRO/SHEATH, NON-LASER: HCPCS | Performed by: UROLOGY

## 2021-06-26 DEVICE — URETERAL STENT 6 FR X 24CM INLAY
Type: IMPLANTABLE DEVICE | Site: URETER | Status: NON-FUNCTIONAL
Removed: 2021-07-10

## 2021-06-26 RX ORDER — LEVOFLOXACIN 5 MG/ML
750 INJECTION, SOLUTION INTRAVENOUS EVERY 24 HOURS
Status: DISCONTINUED | OUTPATIENT
Start: 2021-06-26 | End: 2021-06-27 | Stop reason: HOSPADM

## 2021-06-26 RX ORDER — ACETAMINOPHEN 325 MG/1
650 TABLET ORAL EVERY 6 HOURS PRN
Status: DISCONTINUED | OUTPATIENT
Start: 2021-06-26 | End: 2021-06-27 | Stop reason: HOSPADM

## 2021-06-26 RX ORDER — TOPIRAMATE 100 MG/1
100 TABLET, FILM COATED ORAL
Status: DISCONTINUED | OUTPATIENT
Start: 2021-06-26 | End: 2021-06-27 | Stop reason: HOSPADM

## 2021-06-26 RX ORDER — FLUTICASONE PROPIONATE 50 MCG
2 SPRAY, SUSPENSION (ML) NASAL DAILY
Status: DISCONTINUED | OUTPATIENT
Start: 2021-06-26 | End: 2021-06-27 | Stop reason: HOSPADM

## 2021-06-26 RX ORDER — MIDAZOLAM HYDROCHLORIDE 2 MG/2ML
INJECTION, SOLUTION INTRAMUSCULAR; INTRAVENOUS AS NEEDED
Status: DISCONTINUED | OUTPATIENT
Start: 2021-06-26 | End: 2021-06-26

## 2021-06-26 RX ORDER — PROPOFOL 10 MG/ML
INJECTION, EMULSION INTRAVENOUS AS NEEDED
Status: DISCONTINUED | OUTPATIENT
Start: 2021-06-26 | End: 2021-06-26

## 2021-06-26 RX ORDER — ONDANSETRON 2 MG/ML
4 INJECTION INTRAMUSCULAR; INTRAVENOUS EVERY 6 HOURS PRN
Status: DISCONTINUED | OUTPATIENT
Start: 2021-06-26 | End: 2021-06-27 | Stop reason: HOSPADM

## 2021-06-26 RX ORDER — FENTANYL CITRATE 50 UG/ML
INJECTION, SOLUTION INTRAMUSCULAR; INTRAVENOUS AS NEEDED
Status: DISCONTINUED | OUTPATIENT
Start: 2021-06-26 | End: 2021-06-26

## 2021-06-26 RX ORDER — SODIUM CHLORIDE, SODIUM LACTATE, POTASSIUM CHLORIDE, CALCIUM CHLORIDE 600; 310; 30; 20 MG/100ML; MG/100ML; MG/100ML; MG/100ML
INJECTION, SOLUTION INTRAVENOUS CONTINUOUS PRN
Status: DISCONTINUED | OUTPATIENT
Start: 2021-06-26 | End: 2021-06-26

## 2021-06-26 RX ORDER — KETOROLAC TROMETHAMINE 30 MG/ML
INJECTION, SOLUTION INTRAMUSCULAR; INTRAVENOUS AS NEEDED
Status: DISCONTINUED | OUTPATIENT
Start: 2021-06-26 | End: 2021-06-26

## 2021-06-26 RX ORDER — MAGNESIUM HYDROXIDE 1200 MG/15ML
LIQUID ORAL AS NEEDED
Status: DISCONTINUED | OUTPATIENT
Start: 2021-06-26 | End: 2021-06-26 | Stop reason: HOSPADM

## 2021-06-26 RX ORDER — SODIUM CHLORIDE 9 MG/ML
125 INJECTION, SOLUTION INTRAVENOUS CONTINUOUS
Status: DISCONTINUED | OUTPATIENT
Start: 2021-06-26 | End: 2021-06-27 | Stop reason: HOSPADM

## 2021-06-26 RX ORDER — PHENAZOPYRIDINE HYDROCHLORIDE 100 MG/1
100 TABLET, FILM COATED ORAL
Status: DISCONTINUED | OUTPATIENT
Start: 2021-06-26 | End: 2021-06-27 | Stop reason: HOSPADM

## 2021-06-26 RX ORDER — PANTOPRAZOLE SODIUM 40 MG/1
40 TABLET, DELAYED RELEASE ORAL
Status: DISCONTINUED | OUTPATIENT
Start: 2021-06-26 | End: 2021-06-27 | Stop reason: HOSPADM

## 2021-06-26 RX ORDER — METOCLOPRAMIDE HYDROCHLORIDE 5 MG/ML
INJECTION INTRAMUSCULAR; INTRAVENOUS AS NEEDED
Status: DISCONTINUED | OUTPATIENT
Start: 2021-06-26 | End: 2021-06-26

## 2021-06-26 RX ORDER — ONDANSETRON 2 MG/ML
4 INJECTION INTRAMUSCULAR; INTRAVENOUS ONCE AS NEEDED
Status: DISCONTINUED | OUTPATIENT
Start: 2021-06-26 | End: 2021-06-26 | Stop reason: HOSPADM

## 2021-06-26 RX ORDER — SODIUM CHLORIDE, SODIUM LACTATE, POTASSIUM CHLORIDE, CALCIUM CHLORIDE 600; 310; 30; 20 MG/100ML; MG/100ML; MG/100ML; MG/100ML
100 INJECTION, SOLUTION INTRAVENOUS CONTINUOUS
Status: DISCONTINUED | OUTPATIENT
Start: 2021-06-26 | End: 2021-06-26

## 2021-06-26 RX ORDER — METOCLOPRAMIDE 10 MG/1
5 TABLET ORAL
Status: DISCONTINUED | OUTPATIENT
Start: 2021-06-26 | End: 2021-06-27 | Stop reason: HOSPADM

## 2021-06-26 RX ORDER — ONDANSETRON 2 MG/ML
INJECTION INTRAMUSCULAR; INTRAVENOUS AS NEEDED
Status: DISCONTINUED | OUTPATIENT
Start: 2021-06-26 | End: 2021-06-26

## 2021-06-26 RX ORDER — DEXAMETHASONE SODIUM PHOSPHATE 10 MG/ML
INJECTION, SOLUTION INTRAMUSCULAR; INTRAVENOUS AS NEEDED
Status: DISCONTINUED | OUTPATIENT
Start: 2021-06-26 | End: 2021-06-26

## 2021-06-26 RX ORDER — LIDOCAINE HYDROCHLORIDE 10 MG/ML
INJECTION, SOLUTION EPIDURAL; INFILTRATION; INTRACAUDAL; PERINEURAL AS NEEDED
Status: DISCONTINUED | OUTPATIENT
Start: 2021-06-26 | End: 2021-06-26

## 2021-06-26 RX ADMIN — SODIUM CHLORIDE 125 ML/HR: 0.9 INJECTION, SOLUTION INTRAVENOUS at 08:55

## 2021-06-26 RX ADMIN — FENTANYL CITRATE 25 MCG: 50 INJECTION, SOLUTION INTRAMUSCULAR; INTRAVENOUS at 11:46

## 2021-06-26 RX ADMIN — MORPHINE SULFATE 2 MG: 2 INJECTION, SOLUTION INTRAMUSCULAR; INTRAVENOUS at 13:30

## 2021-06-26 RX ADMIN — ONDANSETRON 4 MG: 2 INJECTION INTRAMUSCULAR; INTRAVENOUS at 11:20

## 2021-06-26 RX ADMIN — SODIUM CHLORIDE 125 ML/HR: 0.9 INJECTION, SOLUTION INTRAVENOUS at 23:07

## 2021-06-26 RX ADMIN — METOCLOPRAMIDE 5 MG: 10 TABLET ORAL at 18:31

## 2021-06-26 RX ADMIN — SODIUM CHLORIDE, SODIUM LACTATE, POTASSIUM CHLORIDE, AND CALCIUM CHLORIDE: .6; .31; .03; .02 INJECTION, SOLUTION INTRAVENOUS at 10:57

## 2021-06-26 RX ADMIN — SODIUM CHLORIDE, SODIUM LACTATE, POTASSIUM CHLORIDE, AND CALCIUM CHLORIDE 100 ML/HR: .6; .31; .03; .02 INJECTION, SOLUTION INTRAVENOUS at 13:14

## 2021-06-26 RX ADMIN — SODIUM CHLORIDE 125 ML/HR: 0.9 INJECTION, SOLUTION INTRAVENOUS at 02:02

## 2021-06-26 RX ADMIN — PROPOFOL 300 MG: 10 INJECTION, EMULSION INTRAVENOUS at 11:20

## 2021-06-26 RX ADMIN — FENTANYL CITRATE 50 MCG: 50 INJECTION, SOLUTION INTRAMUSCULAR; INTRAVENOUS at 11:34

## 2021-06-26 RX ADMIN — FENTANYL CITRATE 25 MCG: 50 INJECTION, SOLUTION INTRAMUSCULAR; INTRAVENOUS at 11:49

## 2021-06-26 RX ADMIN — METOCLOPRAMIDE HYDROCHLORIDE 10 MG: 5 INJECTION INTRAMUSCULAR; INTRAVENOUS at 10:57

## 2021-06-26 RX ADMIN — PHENAZOPYRIDINE 100 MG: 100 TABLET ORAL at 17:12

## 2021-06-26 RX ADMIN — TOPIRAMATE 100 MG: 100 TABLET, FILM COATED ORAL at 21:24

## 2021-06-26 RX ADMIN — LIDOCAINE HYDROCHLORIDE 50 MG: 10 INJECTION, SOLUTION EPIDURAL; INFILTRATION; INTRACAUDAL; PERINEURAL at 11:20

## 2021-06-26 RX ADMIN — LEVOFLOXACIN 750 MG: 5 INJECTION, SOLUTION INTRAVENOUS at 18:31

## 2021-06-26 RX ADMIN — MIDAZOLAM 2 MG: 1 INJECTION INTRAMUSCULAR; INTRAVENOUS at 11:16

## 2021-06-26 RX ADMIN — KETOROLAC TROMETHAMINE 15 MG: 30 INJECTION, SOLUTION INTRAMUSCULAR at 11:49

## 2021-06-26 RX ADMIN — PANTOPRAZOLE SODIUM 40 MG: 40 TABLET, DELAYED RELEASE ORAL at 06:23

## 2021-06-26 RX ADMIN — DEXAMETHASONE SODIUM PHOSPHATE 4 MG: 10 INJECTION, SOLUTION INTRAMUSCULAR; INTRAVENOUS at 11:20

## 2021-06-26 RX ADMIN — ENOXAPARIN SODIUM 40 MG: 40 INJECTION SUBCUTANEOUS at 17:12

## 2021-06-26 NOTE — DISCHARGE INSTR - AVS FIRST PAGE
Take cefadroxil 500mg by mouth twice a day for 3 doses  Arrange for surgery to treat stone in a week or so

## 2021-06-26 NOTE — ED NOTES
Patient taking 100mg of home dose of Topamax, okay per Dr Maryam Trejo       Manual Samaria, RN  06/25/21 9124

## 2021-06-26 NOTE — ASSESSMENT & PLAN NOTE
Patient presented to the ED with left flank pain of sudden onset this afternoon associated with nausea and vomiting  CT revealed a 5 5mm obstructing left proximal ureteral calculus causing mild left hydroureteronephrosis      · Admit to medicine  · Nephrology consult appreciated  · NPO for possible cysto in AM  · Continue levaquin  · Strain urine

## 2021-06-26 NOTE — ED NOTES
Patient provided water and reports wife will be bringing something for her to eat  Dr Vladislav Coto aware and in agreement that patient will be NPO after midnight        Petra Calzada RN  06/25/21 2005

## 2021-06-26 NOTE — LETTER
[address] [facility] [address]  [place]  [place] [address]  Dept: [phone]    June 27, 2021     Patient: Raffaele Saravia   YOB: 1980   Date of Visit: 6/25/2021             To whom it may concern,    Raffaele Saravia was hospitalized under my care from 6/25/2021 to 06/27/21  Please excuse from all appointments and/or work and school related activities during this interim  Patient may return to work at previous activity level on/or after 5/12/2021  Feel free to contact me with any questions if necessary           Sincerely,                 Briseyda Ovalles, [address] [facility] Internal Medicine       Diplomate, [employer] Board of Internal Medicine

## 2021-06-26 NOTE — PLAN OF CARE
Problem: GENITOURINARY - ADULT  Goal: Maintains or returns to baseline urinary function  Description: INTERVENTIONS:  - Assess urinary function  - Encourage oral fluids to ensure adequate hydration if ordered  - Administer IV fluids as ordered to ensure adequate hydration  - Administer ordered medications as needed  - Offer frequent toileting  - Follow urinary retention protocol if ordered  Outcome: Progressing  Goal: Absence of urinary retention  Description: INTERVENTIONS:  - Assess patients ability to void and empty bladder  - Monitor I/O  - Bladder scan as needed  - Discuss with physician/AP medications to alleviate retention as needed  - Discuss catheterization for long term situations as appropriate  Outcome: Progressing     Problem: RESPIRATORY - ADULT  Goal: Achieves optimal ventilation and oxygenation  Description: INTERVENTIONS:  - Assess for changes in respiratory status  - Assess for changes in mentation and behavior  - Position to facilitate oxygenation and minimize respiratory effort  - Oxygen administered by appropriate delivery if ordered  - Initiate smoking cessation education as indicated  - Encourage broncho-pulmonary hygiene including cough, deep breathe, Incentive Spirometry  - Assess the need for suctioning and aspirate as needed  - Assess and instruct to report SOB or any respiratory difficulty  - Respiratory Therapy support as indicated  Outcome: Progressing

## 2021-06-26 NOTE — ED NOTES
Patient wife brought 5 mg Reglan and 30 mg Prevacid patient took at this time  Ok per Dr Zuñiga Lipoma       Mariluz May, MAX  06/25/21 0819

## 2021-06-26 NOTE — ANESTHESIA PREPROCEDURE EVALUATION
Procedure:  CYSTOSCOPY URETEROSCOPY WITH LITHOTRIPSY HOLMIUM LASER, RETROGRADE PYELOGRAM AND INSERTION STENT URETERAL (Left Bladder)    Relevant Problems   ANESTHESIA (within normal limits)      CARDIO (within normal limits)      PULMONARY   (+) Sleep apnea        Physical Exam    Airway    Mallampati score: III  TM Distance: <3 FB  Neck ROM: full     Dental   No notable dental hx     Cardiovascular  Rhythm: regular, Rate: normal,     Pulmonary  Breath sounds clear to auscultation,     Other Findings        Anesthesia Plan  ASA Score- 3 Emergent    Anesthesia Type- general with ASA Monitors  Additional Monitors:   Airway Plan:           Plan Factors-Exercise tolerance (METS): >4 METS  Chart reviewed  Existing labs reviewed  Patient summary reviewed  Patient is not a current smoker  Induction- intravenous  Postoperative Plan- Plan for postoperative opioid use  Planned trial extubation    Informed Consent- Anesthetic plan and risks discussed with patient  I personally reviewed this patient with the CRNA  Discussed and agreed on the Anesthesia Plan with the CRNA  Sigrid Landeros

## 2021-06-26 NOTE — H&P
Susy 47  H&P- Sanket Emanuel 1980, 47 y o  female MRN: [id number]  [facility] [id number]  Primary Care Provider: Rochelle Treviño DO   Date and time admitted to hospital: 6/26/2021 12:17 AM    * Ureteral stone  Assessment & Plan  Patient presented to the ED with left flank pain of sudden onset this afternoon associated with nausea and vomiting  CT revealed a 5 5mm obstructing left proximal ureteral calculus causing mild left hydroureteronephrosis  · Admit to medicine  · Nephrology consult appreciated  · NPO for possible cysto in AM  · Continue levaquin  · Strain urine    Migraines  Assessment & Plan  Continue topamax    Pseudotumor  Assessment & Plan  Not on medications, follows outpatient with opthalmology and neurology    Gastroparesis  Assessment & Plan  No current symptoms      VTE Prophylaxis: Enoxaparin (Lovenox)  / sequential compression device   Code Status: Level 1 - Full Code    Anticipated Length of Stay:  Patient will be admitted on an Observation basis with an anticipated length of stay of less than 2 midnights  Justification for Hospital Stay: ureteral stone with hydronephrosis     Total Time for Visit, including Counseling / Coordination of Care: 15 minutes  Greater than 50% of this total time spent on direct patient counseling and coordination of care  Chief Complaint:   No chief complaint on file  History of Present Illness:    Sanket Emanuel is a 47 y o  female with a PMH of migraines, pseudotumor, gastroparesis, GERD who presents with left flank pain  Patient was in her usual state of health until this afternoon when she developed sudden onset left flank pain with some radiation under the ribcage  It was associated with nausea and vomiting  She came to the ER was found to have a 5 5 mm obstructing left proximal ureteral calculus causing mild left hydroureter nephrosis    Case was discussed with Urology on-call and plan is for cystoscopy in the morning  Review of Systems:    Review of Systems   Constitutional: Negative  HENT: Negative  Eyes: Negative  Respiratory: Negative  Cardiovascular: Negative  Gastrointestinal: Positive for abdominal pain, nausea and vomiting  Endocrine: Negative  Genitourinary: Negative  Musculoskeletal: Negative  Skin: Negative  Allergic/Immunologic: Negative  Neurological: Negative  Hematological: Negative  Psychiatric/Behavioral: Negative  Past Medical and Surgical History:     Past Medical History:   Diagnosis Date    Gastroparesis     Migraine     Pseudotumor cerebri        Past Surgical History:   Procedure Laterality Date    CARPAL TUNNEL RELEASE Right        Meds/Allergies:    Prior to Admission medications    Medication Sig Start Date End Date Taking? Authorizing Provider   albuterol (PROAIR HFA) 90 mcg/act inhaler Inhale 2 puffs as needed  Patient not taking: Reported on 6/26/2021    Historical Provider, MD   Cholecalciferol 2000 units CAPS Take 2 capsules by mouth daily     Historical Provider, MD   desloratadine (CLARINEX REDITAB) 5 MG disintegrating tablet Take 10 mg by mouth daily     Historical Provider, MD   fluticasone (FLONASE SENSIMIST) 27 5 MCG/SPRAY nasal spray 2 sprays into each nostril daily     Historical Provider, MD   lansoprazole (PREVACID SOLUTAB) 30 mg disintegrating tablet Take 30 mg by mouth 2 (two) times a day  11/9/18   Historical Provider, MD   metoclopramide (REGLAN) 5 mg tablet Take 5 mg by mouth 3 (three) times a day as needed  11/25/18   Historical Provider, MD   topiramate (TOPAMAX) 100 mg tablet Take 100 mg by mouth daily     Historical Provider, MD       Allergies:    Allergies   Allergen Reactions    Coconut Oil - Food Allergy     Fentanyl Vomiting    Prednisone Rash       Social History:     Marital Status: /Civil Union   Substance Use History:   Social History     Substance and Sexual Activity   Alcohol Use Never     Social History     Tobacco Use   Smoking Status Never Smoker   Smokeless Tobacco Never Used     Social History     Substance and Sexual Activity   Drug Use Never       Family History:    No family history on file  Physical Exam:     Vitals:   Blood Pressure: 126/86 (06/26/21 0030)  Pulse: 82 (06/26/21 0030)  Temperature: 98 9 °F (37 2 °C) (06/26/21 0030)  Respirations: 19 (06/26/21 0030)  Height: 4' 11" (149 9 cm) (06/26/21 0026)  Weight - Scale: 112 kg (246 lb 11 2 oz) (06/26/21 0026)  SpO2: 96 % (06/26/21 0030)    Physical Exam  Vitals and nursing note reviewed  Constitutional:       Appearance: Normal appearance  HENT:      Head: Normocephalic  Nose: Nose normal    Eyes:      Extraocular Movements: Extraocular movements intact  Cardiovascular:      Rate and Rhythm: Normal rate and regular rhythm  Heart sounds: No murmur heard  Pulmonary:      Effort: Pulmonary effort is normal  No respiratory distress  Breath sounds: Normal breath sounds  No wheezing  Abdominal:      General: Abdomen is flat  Bowel sounds are normal  There is no distension  Palpations: Abdomen is soft  Tenderness: There is no abdominal tenderness  Musculoskeletal:         General: No swelling  Normal range of motion  Skin:     General: Skin is warm and dry  Neurological:      General: No focal deficit present  Mental Status: She is alert and oriented to person, place, and time  Psychiatric:         Mood and Affect: Mood normal          Behavior: Behavior normal            Additional Data:     Lab Results: I have personally reviewed pertinent reports        Results from last 7 days   Lab Units 06/25/21  1419   WBC Thousand/uL 14 77*   HEMOGLOBIN g/dL 13 3   HEMATOCRIT % 40 2   PLATELETS Thousands/uL 331   NEUTROS PCT % 71     Results from last 7 days   Lab Units 06/25/21  1419   SODIUM mmol/L 138   POTASSIUM mmol/L 3 8   CHLORIDE mmol/L 104   CO2 mmol/L 22   BUN mg/dL 20   CREATININE mg/dL 0 78   CALCIUM mg/dL 9 1   TOTAL BILIRUBIN mg/dL 0 28*   ALK PHOS U/L 64 6   ALT U/L 14   AST U/L 15                       Imaging: I have personally reviewed pertinent reports  No orders to display       No orders to display       EKG, Pathology, and Other Studies Reviewed on Admission:   · EKG: not done     Allscripts / Epic Records Reviewed: Yes     ** Please Note: This note has been constructed using a voice recognition system   **

## 2021-06-26 NOTE — LETTER
[address] [facility] [address]  [place]  [place] [address]  Dept: [phone]    June 27, 2021     Patient: Minerva Kumar   YOB: 1980   Date of Visit: 6/25/2021       To Whom it May Concern:      Minerva Kumar is currently hospitalized under my professional care from 6/25/2021 to 06/27/21  Please excuse from all appointments and/or work and school related activities during this time  Patient may return to work at previous activity level on/or after 7/12/2021  Feel free to call with any questions      Sincerely,            Emily Ewing, [address] [facility]  Diplomate, American Board of Internal Medicine

## 2021-06-26 NOTE — ED CARE HANDOFF
Emergency Department Sign Out Note        Sign out and transfer of care from my colleague Dr Junior Evangelista  See Separate Emergency Department note  Patient is a 30-year-old female seen in the emergency department and diagnosed with left ureteral stone  Patient is being transferred to [facility] for further evaluation and treatment                  Labs Reviewed   URINALYSIS WITH REFLEX TO SCOPE - Abnormal       Result Value Ref Range Status    Color, UA Suha (*) Yellow Final    Clarity, UA Cloudy (*) Clear Final    Specific Gravity, UA >=1 030  1 001 - 1 030 Final    pH, UA 5 5  5 0, 5 5, 6 0, 6 5, 7 0, 7 5, 8 0 Final    Leukocytes, UA Negative  Negative Final    Nitrite, UA Negative  Negative Final    Protein, UA 2+ (*) Negative, Interference- unable to analyze mg/dl Final    Glucose, UA Negative  Negative mg/dl Final    Ketones, UA Negative  Negative mg/dl Final    Urobilinogen, UA 0 2  0 2, 1 0 E U /dl E U /dl Final    Bilirubin, UA Negative  Negative Final    Blood, UA 3+ (*) Negative Final   URINE MICROSCOPIC - Abnormal    RBC, UA 20-30 (*) None Seen, 0-1, 1-2, 2-4, 0-5 /hpf Final    WBC, UA 2-4  None Seen, 0-1, 1-2, 0-5, 2-4 /hpf Final    Epithelial Cells Occasional  None Seen, Occasional /hpf Final    Bacteria, UA Innumerable (*) None Seen, Occasional /hpf Final    AMORPH URATES Innumerable  /hpf Final   CBC AND DIFFERENTIAL - Abnormal    WBC 14 77 (*) 4 31 - 10 16 Thousand/uL Final    RBC 4 78  3 81 - 5 12 Million/uL Final    Hemoglobin 13 3  11 5 - 15 4 g/dL Final    Hematocrit 40 2  34 8 - 46 1 % Final    MCV 84  82 - 98 fL Final    MCH 27 8  26 8 - 34 3 pg Final    MCHC 33 1  31 4 - 37 4 g/dL Final    RDW 13 9  11 6 - 15 1 % Final    MPV 9 7  8 9 - 12 7 fL Final    Platelets 796  040 - 390 Thousands/uL Final    Neutrophils Relative 71  43 - 75 % Final    Immat GRANS % 1  0 - 2 % Final    Lymphocytes Relative 20  14 - 44 % Final    Monocytes Relative 7  4 - 12 % Final    Eosinophils Relative 1  0 - 6 % Final    Basophils Relative 0  0 - 1 % Final    Neutrophils Absolute 10 38 (*) 1 85 - 7 62 Thousands/µL Final    Immature Grans Absolute 0 11  0 00 - 0 20 Thousand/uL Final    Lymphocytes Absolute 2 98  0 60 - 4 47 Thousands/µL Final    Monocytes Absolute 1 06  0 17 - 1 22 Thousand/µL Final    Eosinophils Absolute 0 20  0 00 - 0 61 Thousand/µL Final    Basophils Absolute 0 04  0 00 - 0 10 Thousands/µL Final   COMPREHENSIVE METABOLIC PANEL - Abnormal    Sodium 138  133 - 145 mmol/L Final    Potassium 3 8  3 5 - 5 0 mmol/L Final    Chloride 104  96 - 108 mmol/L Final    CO2 22  22 - 33 mmol/L Final    ANION GAP 12  4 - 13 mmol/L Final    BUN 20  6 - 20 mg/dL Final    Creatinine 0 78  0 40 - 1 10 mg/dL Final    Comment: Standardized to IDMS reference method    Glucose 109  65 - 140 mg/dL Final    Comment: If the patient is fasting, the ADA then defines impaired fasting glucose as > 100 mg/dL and diabetes as > or equal to 123 mg/dL  Specimen collection should occur prior to Sulfasalazine administration due to the potential for falsely depressed results  Specimen collection should occur prior to Sulfapyridine administration due to the potential for falsely elevated results  Calcium 9 1  8 4 - 10 2 mg/dL Final    AST 15  15 - 41 U/L Final    Comment: Specimen collection should occur prior to Sulfasalazine administration due to the potential for falsely depressed results  ALT 14  5 - 54 U/L Final    Comment: Specimen collection should occur prior to Sulfasalazine administration due to the potential for falsely depressed results       Alkaline Phosphatase 64 6  35 - 140 U/L Final    Total Protein 7 0  6 4 - 8 3 g/dL Final    Albumin 3 9  3 4 - 4 8 g/dL Final    Total Bilirubin 0 28 (*) 0 30 - 1 20 mg/dL Final    eGFR 97  ml/min/1 73sq m Final    Narrative:     [place] guidelines for Chronic Kidney Disease (CKD):     Stage 1 with normal or high GFR (GFR > 90 mL/min/1 73 square meters)   Stage 2 Mild CKD (GFR = 60-89 mL/min/1 73 square meters)    Stage 3A Moderate CKD (GFR = 45-59 mL/min/1 73 square meters)    Stage 3B Moderate CKD (GFR = 30-44 mL/min/1 73 square meters)    Stage 4 Severe CKD (GFR = 15-29 mL/min/1 73 square meters)    Stage 5 End Stage CKD (GFR <15 mL/min/1 73 square meters)  Note: GFR calculation is accurate only with a steady state creatinine   LIPASE - Normal    Lipase 21  13 - 60 u/L Final   POCT PREGNANCY, URINE - Normal    EXT PREG TEST UR (Ref: Negative) negative   Final    Control valid   Final                      Procedures  MDM    Disposition  Final diagnoses:   Nephrolithiasis     Time reflects when diagnosis was documented in both MDM as applicable and the Disposition within this note     Time User Action Codes Description Comment    6/25/2021  5:16 PM Manish Bryan Add [N20 0] Nephrolithiasis       ED Disposition     ED Disposition Condition Date/Time Comment    Transfer to Another Facility-In Network  Fri Jun 25, 2021  5:16 PM Thalia Alcantar should be transferred out to [place]          MD Documentation      Most Recent Value   Patient Condition  The patient has been stabilized such that within reasonable medical probability, no material deterioration of the patient condition or the condition of the unborn child(keith) is likely to result from the transfer   Reason for Transfer  Level of Care needed not available at this facility   Risks of Transfer  Potential for delay in receiving treatment, Potential deterioration of medical condition, Loss of IV, Increased discomfort during transfer   Accepting Physician  Dr Ebenezer Tirado Name, [address] [facility]    (Name & Tel number)  [facility]   Sending MD  [facility]   Provider Certification  General risk, such as traffic hazards, adverse weather conditions, rough terrain or turbulence, possible failure of equipment (including vehicle or aircraft), or consequences of actions of persons outside the control of the transport personnel, Unanticipated needs of medical equipment and personnel during transport, Risk of worsening condition, The possibility of a transport vehicle being unavailable      RN Documentation      [facility] [address] [facility] [address] Name, [address] [facility]    (Name & Tel number)  PAC      Follow-up Information    None       Patient's Medications   Discharge Prescriptions    No medications on file     No discharge procedures on file         ED Provider  Electronically Signed by     Mulu García MD  06/25/21 5303

## 2021-06-26 NOTE — CASE MANAGEMENT
Met with pt to explain Observation Status notice  Pt signed and copy given  Copy also placed in scan bin for chart

## 2021-06-26 NOTE — OP NOTE
OPERATIVE REPORT- Dr Ant Guzman  PATIENT NAME: Torin Godoy    :  1980  MRN: [id number]  Pt Location: [facility] OR ROOM 04    SURGERY DATE: 2021    Surgeon: Jaswinder Rodriguez MD    Pre-op Diagnosis:  1  Left proximal to mid 6 mm ureteral stone  2  Left hydronephrosis  3  Cystitis  4  Morbid obesity    Post-op Diagnosis:  1  Same    Procedure:  1  Cystoscopy, fluoroscopy, left retrograde pyelography, ureteroscopy, and left ureteral stent placement  No lithotripsy possible due to ureteral narrowing    Specimen(s): * No specimens in log *    Estimated Blood Loss: Minimal    Complications: None    Drains:  Six [place] 24 cm left ureteral stent    Anesthesia type: Gen LMA    Indications for surgery:  74-year-old woman with no prior history of kidney stones presents with left flank pain  She was evaluated in the [facility] emergency room with a CT found to have a 6 mm stone in proximal left ureter  She was too uncomfortable to be discharged  She had 30 urine so she was brought to [facility] as [facility] does not have operating rooms on the weekend  After long discussion the patient preferred to have ureteroscopy with laser lithotripsy if possible  She understood that there was risk that she would just be stented  Findings:  She had a narrowing in the left mid ureter which would not allow the ureteral access sheath the ureteral scope to pass proximally  I was unable to reach the stone  Procedure and Technique:   After obtaining consent and identifying the patient, antibiotics were given as ordered and the patient was brought to the room  All appropriate leads and monitors were placed and the patient was appropriately positioned on the table  Anesthesia was administered and the patient was sterilely prepped and draped  A timeout was performed where the patient name, , procedure, antibiotics, allergies, etc  were discussed    All in the room were satisfied before the start of the operative procedure  What follows are the operative findings and events  Cystoscopy was undertaken  A retrograde was performed  The stone was very proximal in the ureter and noted as a filling defect   imaging did not actually see the stone very well due to her obesity  A guidewire was passed up to the kidney  A second wire was then placed  The scope was removed and the first wire was secured to the drape while the second was used pass the ureteral access sheath with its dilator  It would not pass proximal to the pelvic brim  The sheath was removed and just the dilator was used  It was able to pass through the area of obstruction with some difficulty  It was removed and the sheath reattached and again had difficulty passing up mid ureter  I decided to just try the ureteral scope over a wire with the access sheath just barely in the ureter but the sheath backed out and the scope would not pass  There was not enough purchase to allow it to get through the obstruction  It was coiling in the bladder  The sheath and scope were removed  The rigid ureteral scope was then opened and placed over the second wire  With direct visualization I was still unable to negotiate the scope between the two wires  The ureter would just not dilate  The rigid scope was removed and one last attempt was made with the ureteral access sheath to go over the first wire  This was unsuccessful  At this point the wire was backloaded through the cystoscope and a six [place] stent was placed with a good coil proximally and distally  There was clear efflux from the stent  Bladder was drained  The procedure was terminated and the patient was awakened without incident and transferred to the PACU in satisfactory condition  Plan:  1  She will need ureteroscopy in the future after the ureter has had a chance to passively dilate      SIGNATURE: Grayson Vences MD  DATE: June 26, 2021  TIME: 11:55 AM    Portions of the record may have been created with voice recognition software  Occasional wrong word or "sound alike" substitutions may have occurred due to the inherent limitations of voice recognition software  Read the chart carefully and recognize, using context, where substitutions have occurred

## 2021-06-26 NOTE — ANESTHESIA POSTPROCEDURE EVALUATION
Post-Op Assessment Note    CV Status:  Stable  Pain Score: 0    Pain management: adequate     Mental Status:  Awake and sleepy   Hydration Status:  Euvolemic   PONV Controlled:  Controlled   Airway Patency:  Patent      Post Op Vitals Reviewed: Yes      Staff: CRNA         No complications documented      BP   110/68   Temp      Pulse 96   Resp 16   SpO2 95

## 2021-06-26 NOTE — QUICK NOTE
Patient seen  Still having discomfort in the flank  Labs reviewed  Creatinine normal   White count elevated  Urinalysis shows bacteria but a small number of white cells suggesting this is possibly just a contaminant  She was explained all of her options including continued observation both inpatient or outpatient with medical expulsive therapy or surgical intervention which is usually successful given that this is a ureteral stone  There is always the potential that the stone will migrate proximally during the procedure and might require stenting and follow on surgery a week or so later  She is aware that she might just be stented  She is aware that she may suffer trauma to the urethra bladder ureter as a result of instrumentation  We intend to use a laser to break the stone which can cause trauma to the ureteral wall and even perforation  She may end up with a nephrostomy tube  She had ample opportunity to ask questions and prefers this approach rather than continued observation  She will need the stent removed in a week in the office  The OR has been notified    She was vaccinated back in January and is asymptomatic from a respiratory standpoint so we will proceed without a rapid COVID test

## 2021-06-27 VITALS
SYSTOLIC BLOOD PRESSURE: 131 MMHG | DIASTOLIC BLOOD PRESSURE: 87 MMHG | OXYGEN SATURATION: 95 % | WEIGHT: 246.7 LBS | RESPIRATION RATE: 17 BRPM | HEIGHT: 59 IN | TEMPERATURE: 98.3 F | BODY MASS INDEX: 49.73 KG/M2 | HEART RATE: 86 BPM

## 2021-06-27 PROBLEM — E66.9 OBESITY, UNSPECIFIED: Status: ACTIVE | Noted: 2021-06-26

## 2021-06-27 LAB
ALBUMIN SERPL BCP-MCNC: 2.9 G/DL (ref 3.5–5)
ALP SERPL-CCNC: 58 U/L (ref 46–116)
ALT SERPL W P-5'-P-CCNC: 21 U/L (ref 12–78)
ANION GAP SERPL CALCULATED.3IONS-SCNC: 11 MMOL/L (ref 4–13)
AST SERPL W P-5'-P-CCNC: 15 U/L (ref 5–45)
BILIRUB SERPL-MCNC: 0.21 MG/DL (ref 0.2–1)
BUN SERPL-MCNC: 13 MG/DL (ref 5–25)
CALCIUM ALBUM COR SERPL-MCNC: 9.2 MG/DL (ref 8.3–10.1)
CALCIUM SERPL-MCNC: 8.3 MG/DL (ref 8.3–10.1)
CHLORIDE SERPL-SCNC: 109 MMOL/L (ref 100–108)
CO2 SERPL-SCNC: 23 MMOL/L (ref 21–32)
CREAT SERPL-MCNC: 0.65 MG/DL (ref 0.6–1.3)
ERYTHROCYTE [DISTWIDTH] IN BLOOD BY AUTOMATED COUNT: 13.6 % (ref 11.6–15.1)
GFR SERPL CREATININE-BSD FRML MDRD: 113 ML/MIN/1.73SQ M
GLUCOSE P FAST SERPL-MCNC: 108 MG/DL (ref 65–99)
GLUCOSE SERPL-MCNC: 108 MG/DL (ref 65–140)
HCT VFR BLD AUTO: 37.5 % (ref 34.8–46.1)
HGB BLD-MCNC: 11.7 G/DL (ref 11.5–15.4)
MCH RBC QN AUTO: 27.7 PG (ref 26.8–34.3)
MCHC RBC AUTO-ENTMCNC: 31.2 G/DL (ref 31.4–37.4)
MCV RBC AUTO: 89 FL (ref 82–98)
PLATELET # BLD AUTO: 291 THOUSANDS/UL (ref 149–390)
PMV BLD AUTO: 9.8 FL (ref 8.9–12.7)
POTASSIUM SERPL-SCNC: 4 MMOL/L (ref 3.5–5.3)
PROT SERPL-MCNC: 6.4 G/DL (ref 6.4–8.2)
RBC # BLD AUTO: 4.22 MILLION/UL (ref 3.81–5.12)
SODIUM SERPL-SCNC: 143 MMOL/L (ref 136–145)
WBC # BLD AUTO: 14.42 THOUSAND/UL (ref 4.31–10.16)

## 2021-06-27 PROCEDURE — 94760 N-INVAS EAR/PLS OXIMETRY 1: CPT

## 2021-06-27 PROCEDURE — 85027 COMPLETE CBC AUTOMATED: CPT | Performed by: INTERNAL MEDICINE

## 2021-06-27 PROCEDURE — 99217 PR OBSERVATION CARE DISCHARGE MANAGEMENT: CPT | Performed by: INTERNAL MEDICINE

## 2021-06-27 PROCEDURE — 80053 COMPREHEN METABOLIC PANEL: CPT | Performed by: INTERNAL MEDICINE

## 2021-06-27 RX ORDER — ALPRAZOLAM 0.25 MG/1
0.25 TABLET ORAL
Qty: 10 TABLET | Refills: 0 | Status: SHIPPED | OUTPATIENT
Start: 2021-06-27 | End: 2022-05-06 | Stop reason: HOSPADM

## 2021-06-27 RX ORDER — OXYCODONE HYDROCHLORIDE AND ACETAMINOPHEN 5; 325 MG/1; MG/1
1 TABLET ORAL EVERY 8 HOURS PRN
Qty: 20 TABLET | Refills: 0 | Status: ON HOLD | OUTPATIENT
Start: 2021-06-27 | End: 2021-07-10 | Stop reason: SDUPTHER

## 2021-06-27 RX ORDER — TAMSULOSIN HYDROCHLORIDE 0.4 MG/1
0.4 CAPSULE ORAL
Qty: 14 CAPSULE | Refills: 0 | Status: SHIPPED | OUTPATIENT
Start: 2021-06-27 | End: 2022-05-06 | Stop reason: HOSPADM

## 2021-06-27 RX ORDER — PHENAZOPYRIDINE HYDROCHLORIDE 100 MG/1
100 TABLET, FILM COATED ORAL 3 TIMES DAILY PRN
Qty: 10 TABLET | Refills: 0 | Status: SHIPPED | OUTPATIENT
Start: 2021-06-27 | End: 2021-07-10

## 2021-06-27 RX ORDER — LEVOFLOXACIN 500 MG/1
500 TABLET, FILM COATED ORAL EVERY 24 HOURS
Qty: 7 TABLET | Refills: 0 | Status: SHIPPED | OUTPATIENT
Start: 2021-06-27 | End: 2021-07-04

## 2021-06-27 RX ADMIN — ENOXAPARIN SODIUM 40 MG: 40 INJECTION SUBCUTANEOUS at 10:11

## 2021-06-27 RX ADMIN — FLUTICASONE PROPIONATE 2 SPRAY: 50 SPRAY, METERED NASAL at 10:11

## 2021-06-27 RX ADMIN — MORPHINE SULFATE 2 MG: 2 INJECTION, SOLUTION INTRAMUSCULAR; INTRAVENOUS at 02:18

## 2021-06-27 RX ADMIN — METOCLOPRAMIDE 5 MG: 10 TABLET ORAL at 11:41

## 2021-06-27 RX ADMIN — PHENAZOPYRIDINE 100 MG: 100 TABLET ORAL at 07:49

## 2021-06-27 RX ADMIN — PHENAZOPYRIDINE 100 MG: 100 TABLET ORAL at 11:41

## 2021-06-27 RX ADMIN — PANTOPRAZOLE SODIUM 40 MG: 40 TABLET, DELAYED RELEASE ORAL at 06:09

## 2021-06-27 NOTE — UTILIZATION REVIEW
Initial Clinical Review    Elective surgical procedure  Observation post op   Age/Sex: 47 y o  female with history of kidney stones presents with left flank pain from [facility] ed  Ct found 6 mm stone in left proximal ureter     Anesthesia Start Date/Time: 06/26/21 1116   Procedure: CYSTOSCOPY URETEROSCOPY WITH , RETROGRADE PYELOGRAM AND INSERTION STENT URETERAL (Left Bladder)   Anesthesia type: general   Diagnosis: Ureteral stone [N20 1]   Pre-op diagnosis: Ureteral stone [N20 1]     Procedure:  1  Cystoscopy, fluoroscopy, left retrograde pyelography, ureteroscopy, and left ureteral stent placement  No lithotripsy possible due to ureteral narrowing     Estimated Blood Loss: Minimal     Complications: None     Drains:  Six [place] 24 cm left ureteral stent    Findings:  She had a narrowing in the left mid ureter which would not allow the ureteral access sheath the ureteral scope to pass proximally  I was unable to reach the stone      POD#1 Progress Note:   Discharged to home         Admission Orders: Date/Time/Statement:   Admission Orders (From admission, onward)     Ordered        06/26/21 0057  Place in Observation  Once                   Orders Placed This Encounter   Procedures    Place in Observation     Standing Status:   Standing     Number of Occurrences:   1     Order Specific Question:   Level of Care     Answer:   Med Surg [16]     Vital Signs: /87   Pulse 86   Temp 98 3 °F (36 8 °C) (Oral)   Resp 17   Ht 4' 11" (1 499 m)   Wt 112 kg (246 lb 11 2 oz)   SpO2 95%   BMI 49 83 kg/m²     Pertinent Labs/Diagnostic Test Results:       Results from last 7 days   Lab Units 06/27/21  0513 06/25/21  1419   WBC Thousand/uL 14 42* 14 77*   HEMOGLOBIN g/dL 11 7 13 3   HEMATOCRIT % 37 5 40 2   PLATELETS Thousands/uL 291 331   NEUTROS ABS Thousands/µL  --  10 38*         Results from last 7 days   Lab Units 06/27/21  0513 06/25/21  1419   SODIUM mmol/L 143 138   POTASSIUM mmol/L 4 0 3 8 CHLORIDE mmol/L 109* 104   CO2 mmol/L 23 22   ANION GAP mmol/L 11 12   BUN mg/dL 13 20   CREATININE mg/dL 0 65 0 78   EGFR ml/min/1 73sq m 113 97   CALCIUM mg/dL 8 3 9 1     Results from last 7 days   Lab Units 06/27/21  0513 06/25/21  1419   AST U/L 15 15   ALT U/L 21 14   ALK PHOS U/L 58 64 6   TOTAL PROTEIN g/dL 6 4 7 0   ALBUMIN g/dL 2 9* 3 9   TOTAL BILIRUBIN mg/dL 0 21 0 28*         Results from last 7 days   Lab Units 06/27/21  0513 06/25/21  1419   GLUCOSE RANDOM mg/dL 108 109         Results from last 7 days   Lab Units 06/25/21  1419   LIPASE u/L 21             Results from last 7 days   Lab Units 06/25/21  1350   CLARITY UA  Cloudy*   COLOR UA  Suha*   SPEC GRAV UA  >=1 030   PH UA  5 5   GLUCOSE UA mg/dl Negative   KETONES UA mg/dl Negative   BLOOD UA  3+*   PROTEIN UA mg/dl 2+*   NITRITE UA  Negative   BILIRUBIN UA  Negative   UROBILINOGEN UA E U /dl 0 2   LEUKOCYTES UA  Negative   WBC UA /hpf 2-4   RBC UA /hpf 20-30*   BACTERIA UA /hpf Innumerable*   EPITHELIAL CELLS WET PREP /hpf Occasional         Diet: regular  Mobility: ambulatory  DVT Prophylaxis: lovenox    Medications/Pain Control: controlled  On iv mso4 prn  Received x2  With improvement 6-7/10 to 2/10      Scheduled Medications:  enoxaparin, 40 mg, Subcutaneous, BID  fluticasone, 2 spray, Each Nare, Daily  levofloxacin, 750 mg, Intravenous, Q24H  metoclopramide, 5 mg, Oral, BID before lunch/dinner  pantoprazole, 40 mg, Oral, Early Morning  phenazopyridine, 100 mg, Oral, TID With Meals  topiramate, 100 mg, Oral, HS      Continuous IV Infusions:  sodium chloride, 125 mL/hr, Intravenous, Continuous      PRN Meds:  acetaminophen, 650 mg, Oral, Q6H PRN  morphine injection, 2 mg, Intravenous, Q3H PRN  ondansetron, 4 mg, Intravenous, Q6H PRN        Network Utilization Review Department  ATTENTION: Please call with any questions or concerns to [phone] and carefully listen to the prompts so that you are directed to the right person   All voicemails are confidential   Lynette Kussmaul all requests for admission clinical reviews, approved or denied determinations and any other requests to dedicated fax number below belonging to the campus where the patient is receiving treatment   List of dedicated fax numbers for the Facilities:  [address] DENIALS (Administrative/Medical Necessity) [phone]   [address] (Maternity/NICU/Pediatrics) [phone]   [address] [place] [address] [facility] [id number] [place] [address] [address] [facility] [place] [address] [phone]

## 2021-06-27 NOTE — DISCHARGE SUMMARY
[place] 47  Discharge- Liyah Boyle 1980, 47 y o  female MRN: [id number]  Unit/Bed#: [address]  Encounter: [id number]  Primary Care Provider: Robbie Sandhu DO   Date and time admitted to hospital: 6/26/2021 12:17 AM    Admitting Provider:  Breanna Yang MD  Discharge Provider:  Dada Chapa DO  Admission Date: 6/26/2021       Discharge Date: 06/27/21   LOS: 1  Primary Care Physician at Discharge: Robbie Sandhu [facility] COURSE:  Liyah Boyle is a 47 y o  female with a history of migraine headache and gastroparesis who presented initially to [facility] for left-sided flank pain  She was found have ureteral calculi that was unlikely to pass  Case was discussed with urology and the patient was transferred here to [facility] where she was started on levofloxacin and eventually underwent cystoscopy/stent placement  Extraction was unable to be performed  She remains afebrile and pain is better controlled  She did have some flank discomfort and case was discussed with Dr Hitesh Diaz who recommended tamsulosin and alprazolam   Percocet 20 tablets has been electronically prescribed  Her other comorbidities were unchanged  Please see problem list listed below  DISCHARGE DIAGNOSES  * Ureteral stone  Assessment & Plan  · 5 mm left ureteral calculi with hydronephrosis  Patient underwent cystoscopy and stent placement  · Urology recommends antibiotics, will discharge with levofloxacin  · Will be discharged with percocet, tamsulosin, pyridium, and alprazolam p r n  second line for urinary spasm    Obesity, unspecified  Assessment & Plan  · Body mass index is 49 83 kg/m²      Migraines  Assessment & Plan  · Continue topiramate    Gastroparesis  Assessment & Plan  · Continue metoclopramide as previously taken    R Darrick Chin 16  Procedure(s) (LRB):  CYSTOSCOPY URETEROSCOPY WITH , RETROGRADE PYELOGRAM AND INSERTION STENT URETERAL (Left)    RADIOLOGY RESULTS  No results found  LABS  Results from last 7 days   Lab Units 06/27/21  0513 06/25/21  1419   WBC Thousand/uL 14 42* 14 77*   HEMOGLOBIN g/dL 11 7 13 3   HEMATOCRIT % 37 5 40 2   MCV fL 89 84   PLATELETS Thousands/uL 291 331     Results from last 7 days   Lab Units 06/27/21  0513 06/25/21  1419   SODIUM mmol/L 143 138   POTASSIUM mmol/L 4 0 3 8   CHLORIDE mmol/L 109* 104   CO2 mmol/L 23 22   BUN mg/dL 13 20   CREATININE mg/dL 0 65 0 78   CALCIUM mg/dL 8 3 9 1   ALBUMIN g/dL 2 9* 3 9   TOTAL BILIRUBIN mg/dL 0 21 0 28*   ALK PHOS U/L 58 64 6   ALT U/L 21 14   AST U/L 15 15   EGFR ml/min/1 73sq m 113 97   GLUCOSE RANDOM mg/dL 108 109               Cultures:   Results from last 7 days   Lab Units 06/25/21  1350   COLOR UA  Suha*   CLARITY UA  Cloudy*   SPEC GRAV UA  >=1 030   PH UA  5 5   LEUKOCYTES UA  Negative   NITRITE UA  Negative   GLUCOSE UA mg/dl Negative   KETONES UA mg/dl Negative   BILIRUBIN UA  Negative   BLOOD UA  3+*      Results from last 7 days   Lab Units 06/25/21  1350   RBC UA /hpf 20-30*   WBC UA /hpf 2-4   EPITHELIAL CELLS WET PREP /hpf Occasional   BACTERIA UA /hpf Innumerable*          DISCHARGE DAY VISIT AND PHYSICAL EXAM:  Subjective:  Patient seen examined  Still having slight left-sided flank pain only when urinating  Vitals:   Blood Pressure: 131/87 (06/27/21 0751)  Pulse: 86 (06/27/21 0751)  Temperature: 98 3 °F (36 8 °C) (06/27/21 0751)  Temp Source: Oral (06/27/21 0751)  Respirations: 17 (06/27/21 0317)  Height: 4' 11" (149 9 cm) (06/26/21 0026)  Weight - Scale: 112 kg (246 lb 11 2 oz) (06/26/21 0026)  SpO2: 95 % (06/27/21 0751)    Physical Exam  Vitals reviewed  Constitutional:       General: She is not in acute distress  Appearance: Normal appearance  Eyes:      General: No scleral icterus  Cardiovascular:      Rate and Rhythm: Regular rhythm  Heart sounds: Normal heart sounds     Pulmonary:      Breath sounds: Normal breath sounds  No wheezing  Abdominal:      General: Bowel sounds are normal       Palpations: Abdomen is soft  Tenderness: There is no guarding or rebound  Musculoskeletal:         General: No swelling  Skin:     General: Skin is warm  Neurological:      General: No focal deficit present  Mental Status: She is alert and oriented to person, place, and time  Psychiatric:         Mood and Affect: Mood normal        Planned Re-admission:  No  Discharge Disposition:  Home    Test Results Pending at Discharge:   Incidental findings:     Medications   · Discharge Medication List: See after visit summary for reconciled discharge medications  Diet restrictions:  Regular diet   Activity restrictions: No strenuous activity  Discharge Condition: stable    Outpatient Follow-Up and Discharge Instructions  See after visit summary section titled Discharge Instructions for information provided to patient and family  Code Status: Level 1 - Full Code  Discharge Statement   I spent 30 minutes discharging the patient  This time was spent on the day of discharge  Greater than 50% of total time was spent with the patient and / or family counseling and / or coordination of care  ** Please Note: This note has been constructed using a voice recognition system   **

## 2021-06-27 NOTE — ASSESSMENT & PLAN NOTE
· 5 mm left ureteral calculi with hydronephrosis  Patient underwent cystoscopy and stent placement  · Urology recommends antibiotics, will discharge with levofloxacin    · Will be discharged with percocet, tamsulosin, pyridium, and alprazolam p r n  second line for urinary spasm

## 2021-06-27 NOTE — NURSING NOTE
Discharge instructions given to patient and spouse  No questions at this time  Patient left stable, all belongings at side

## 2021-06-27 NOTE — DISCHARGE INSTRUCTIONS
Kidney Stones   WHAT YOU NEED TO KNOW:   Kidney stones form in the urinary system when the water and waste in your urine are out of balance  When this happens, certain types of waste crystals separate from the urine  The crystals build up and form kidney stones  You may have more than one kidney stone  DISCHARGE INSTRUCTIONS:   Seek care immediately if:   · You have vomiting that is not relieved by medicine  Contact your healthcare provider if:   · You have a fever  · You have trouble passing urine  · You see blood in your urine  · You have severe pain  · You have any questions or concerns about your condition or care  Medicines:   · NSAIDs , such as ibuprofen, help decrease swelling, pain, and fever  This medicine is available with or without a doctor's order  NSAIDs can cause stomach bleeding or kidney problems in certain people  If you take blood thinner medicine, always ask your healthcare provider if NSAIDs are safe for you  Always read the medicine label and follow directions  · Prescription pain medicine  may be given  Ask your healthcare provider how to take this medicine safely  Some prescription pain medicines contain acetaminophen  Do not take other medicines that contain acetaminophen without talking to your healthcare provider  Too much acetaminophen may cause liver damage  Prescription pain medicine may cause constipation  Ask your healthcare provider how to prevent or treat constipation  · Medicines  to balance your electrolytes may be needed  · Take your medicine as directed  Contact your healthcare provider if you think your medicine is not helping or if you have side effects  Tell him or her if you are allergic to any medicine  Keep a list of the medicines, vitamins, and herbs you take  Include the amounts, and when and why you take them  Bring the list or the pill bottles to follow-up visits   Carry your medicine list with you in case of an emergency  Follow up with your healthcare provider as directed: You may need to return for more tests  Write down your questions so you remember to ask them during your visits  What you can do to manage kidney stones:   · Drink more liquids  Your healthcare provider may tell you to drink at least 8 to 12 (eight-ounce) cups of liquids each day  This helps flush out the kidney stones when you urinate  Water is the best liquid to drink  · Strain your urine every time you go to the bathroom  Urinate through a strainer or a piece of thin cloth to catch the stones  Take the stones to your healthcare provider so they can be sent to the lab for tests  This will help your healthcare providers plan the best treatment for you  · Eat a variety of healthy foods  Healthy foods include fruits, vegetables, whole-grain breads, low-fat dairy products, beans, and fish  You may need to limit how much sodium (salt) or protein you eat  Ask for information about the best foods for you  · Stay active  Your stones may pass more easily if you stay active  Exercise can also help you manage your weight  Ask about the best activities for you  After you pass the kidney stones: Your healthcare provider may  order a 24-hour urine test  Results from a 24-hour urine test will help your healthcare provider plan ways to prevent more stones from forming  Your healthcare provider will give you more instructions  © Copyright [address] Information is for End User's use only and may not be sold, redistributed or otherwise used for commercial purposes  All illustrations and images included in CareNotes® are the copyrighted property of [employer]  or [address] [place] [address]  The above information is an  only  It is not intended as medical advice for individual conditions or treatments   Talk to your doctor, nurse or pharmacist before following any medical regimen to see if it is safe and effective for you       Flank Pain, Ambulatory Care   GENERAL INFORMATION:   Flank pain  is felt in the area below your ribcage and above your hip bones, often in the lower back  Your pain may be dull or so severe that you cannot get comfortable  The pain may stay in one area or radiate to another area  It may worsen and lighten in waves  Flank pain is often a sign of problems with your urinary tract, such as a kidney stone or infection  Seek immediate care for the following symptoms:   · Fever    · Fluttering or jumping heart    · Blood in your urine    · Pain that radiates into your lower abdomen and genital area    · Severe pain in your low back next to your spine    · Fatigue and no desire to eat    · A headache and muscle spasms  Treatment for flank pain  may include medicine to decrease pain or treat a bacterial infection  Follow up with your healthcare provider as directed:  Write down your questions so you remember to ask them during your visits  CARE AGREEMENT:   You have the right to help plan your care  Learn about your health condition and how it may be treated  Discuss treatment options with your caregivers to decide what care you want to receive  You always have the right to refuse treatment  The above information is an  only  It is not intended as medical advice for individual conditions or treatments  Talk to your doctor, nurse or pharmacist before following any medical regimen to see if it is safe and effective for you  © 2014 [address] is for End User's use only and may not be sold, redistributed or otherwise used for commercial purposes  All illustrations and images included in CareNotes® are the copyrighted property of [employer]  or Ishmael Muniz

## 2021-06-27 NOTE — PLAN OF CARE
Problem: GENITOURINARY - ADULT  Goal: Maintains or returns to baseline urinary function  Description: INTERVENTIONS:  - Assess urinary function  - Encourage oral fluids to ensure adequate hydration if ordered  - Administer IV fluids as ordered to ensure adequate hydration  - Administer ordered medications as needed  - Offer frequent toileting  - Follow urinary retention protocol if ordered  6/27/2021 1044 by Constantine Jones RN  Outcome: Progressing  6/27/2021 1044 by Constantine Jones RN  Outcome: Progressing  Goal: Absence of urinary retention  Description: INTERVENTIONS:  - Assess patients ability to void and empty bladder  - Monitor I/O  - Bladder scan as needed  - Discuss with physician/AP medications to alleviate retention as needed  - Discuss catheterization for long term situations as appropriate  6/27/2021 1044 by Constantine Jones RN  Outcome: Progressing  6/27/2021 1044 by Constantine Jones RN  Outcome: Progressing     Problem: RESPIRATORY - ADULT  Goal: Achieves optimal ventilation and oxygenation  Description: INTERVENTIONS:  - Assess for changes in respiratory status  - Assess for changes in mentation and behavior  - Position to facilitate oxygenation and minimize respiratory effort  - Oxygen administered by appropriate delivery if ordered  - Initiate smoking cessation education as indicated  - Encourage broncho-pulmonary hygiene including cough, deep breathe, Incentive Spirometry  - Assess the need for suctioning and aspirate as needed  - Assess and instruct to report SOB or any respiratory difficulty  - Respiratory Therapy support as indicated  6/27/2021 1044 by Constantine Jones RN  Outcome: Progressing  6/27/2021 1044 by Constantine Jones RN  Outcome: Progressing

## 2021-06-29 NOTE — ED PROVIDER NOTES
History  Chief Complaint   Patient presents with    Back Pain     back pain mid left side started 45 minutes ago and radiating to side  "It was intense enough to cause me to throw up"     This is a 71-year-old female who presents emergency department complaining of left flank pain  The patient states pain began 45 minutes prior to arrival   She states the pain was sharp and severe  She had made her throw up  It radiated to the left abdomen  The patient states the pain was more severe than any pain she has had in the past   The patient denies any pain at this current time  She denies nausea or vomiting at this time  She denies chest pain or trouble breathing  She denies fevers or chills  She denies urinary symptoms  Prior to Admission Medications   Prescriptions Last Dose Informant Patient Reported? Taking?    Cholecalciferol 2000 units CAPS  Self Yes No   Sig: Take 2 capsules by mouth daily    desloratadine (CLARINEX REDITAB) 5 MG disintegrating tablet  Self Yes No   Sig: Take 10 mg by mouth daily    fluticasone (FLONASE SENSIMIST) 27 5 MCG/SPRAY nasal spray  Self Yes No   Si sprays into each nostril daily    lansoprazole (PREVACID SOLUTAB) 30 mg disintegrating tablet  Self Yes No   Sig: Take 30 mg by mouth 2 (two) times a day    metoclopramide (REGLAN) 5 mg tablet  Self Yes No   Sig: Take 5 mg by mouth 3 (three) times a day as needed    topiramate (TOPAMAX) 100 mg tablet  Self Yes No   Sig: Take 100 mg by mouth daily       Facility-Administered Medications: None       Past Medical History:   Diagnosis Date    Gastroparesis     Migraine     Pseudotumor cerebri        Past Surgical History:   Procedure Laterality Date    CARPAL TUNNEL RELEASE Right     [place] CYSTO/URETERO W/LITHOTRIPSY &INDWELL STENT INSRT Left 2021    Procedure: CYSTOSCOPY URETEROSCOPY WITH , RETROGRADE PYELOGRAM AND INSERTION STENT URETERAL;  Surgeon: Damari Tovar MD;  Location: [facility];  Service: Urology History reviewed  No pertinent family history  I have reviewed and agree with the history as documented  E-Cigarette/Vaping    E-Cigarette Use Never User      E-Cigarette/Vaping Substances     Social History     Tobacco Use    Smoking status: Never Smoker    Smokeless tobacco: Never Used   Vaping Use    Vaping Use: Never used   Substance Use Topics    Alcohol use: Never    Drug use: Never       Review of Systems   All other systems reviewed and are negative        Physical Exam  Physical Exam   Constitutional:  Vital signs reviewed, patient appears nontoxic, no acute distress  Eyes: Pupils equal round reactive to light and accommodation, extraocular muscles intact  HEENT: trachea midline, no JVD, moist mucous membranes  Respiratory: lung sounds clear throughout, no rhonchi, no rales  Cardiovascular: regular rate rhythm, no murmurs or rubs  Abdomen: soft, nontender, nondistended, no rebound or guarding  Back: no CVA tenderness, normal inspection  Extremities: no edema, pulses equal in all 4 extremities  Neuro: awake, alert, oriented, no focal weakness  Skin: warm, dry, intact, no rashes noted    Vital Signs  ED Triage Vitals   Temperature Pulse Respirations Blood Pressure SpO2   06/25/21 1320 06/25/21 1320 06/25/21 1320 06/25/21 1320 06/25/21 1320   98 °F (36 7 °C) 100 20 137/78 98 %      Temp Source Heart Rate Source Patient Position - Orthostatic VS BP Location FiO2 (%)   06/25/21 1320 06/25/21 1320 06/25/21 1526 06/25/21 1526 --   Oral Monitor Sitting Left arm       Pain Score       06/25/21 1419       6           Vitals:    06/25/21 1526 06/25/21 1619 06/25/21 1835 06/25/21 2055   BP: 129/75 116/68 127/76 129/68   Pulse: 97 89 92 97   Patient Position - Orthostatic VS: Sitting Lying Lying Lying         Visual Acuity      ED Medications  Medications   ketorolac (TORADOL) injection 15 mg (15 mg Intravenous Given 6/25/21 1419)   sodium chloride 0 9 % bolus 1,000 mL (0 mL Intravenous Stopped 6/25/21 1619)   ondansetron (ZOFRAN) injection 4 mg (4 mg Intravenous Given 6/25/21 1420)   morphine (PF) 4 mg/mL injection 4 mg (4 mg Intravenous Given 6/25/21 1528)   levofloxacin (LEVAQUIN) IVPB (premix in dextrose) 500 mg 100 mL (0 mg Intravenous Stopped 6/25/21 1900)   morphine (PF) 4 mg/mL injection 4 mg (4 mg Intravenous Given 6/25/21 2049)   sodium chloride 0 9 % bolus 1,000 mL (0 mL Intravenous Stopped 6/25/21 2257)       Diagnostic Studies  Results Reviewed     Procedure Component Value Units Date/Time    Lipase [[id number]]  (Normal) Collected: 06/25/21 1419    Lab Status: Final result Specimen: Blood from Arm, Right Updated: 06/25/21 1440     Lipase 21 u/L     CMP [[id number]]  (Abnormal) Collected: 06/25/21 1419    Lab Status: Final result Specimen: Blood from Arm, Right Updated: 06/25/21 1440     Sodium 138 mmol/L      Potassium 3 8 mmol/L      Chloride 104 mmol/L      CO2 22 mmol/L      ANION GAP 12 mmol/L      BUN 20 mg/dL      Creatinine 0 78 mg/dL      Glucose 109 mg/dL      Calcium 9 1 mg/dL      AST 15 U/L      ALT 14 U/L      Alkaline Phosphatase 64 6 U/L      Total Protein 7 0 g/dL      Albumin 3 9 g/dL      Total Bilirubin 0 28 mg/dL      eGFR 97 ml/min/1 73sq m     Narrative:      [place] guidelines for Chronic Kidney Disease (CKD):     Stage 1 with normal or high GFR (GFR > 90 mL/min/1 73 square meters)    Stage 2 Mild CKD (GFR = 60-89 mL/min/1 73 square meters)    Stage 3A Moderate CKD (GFR = 45-59 mL/min/1 73 square meters)    Stage 3B Moderate CKD (GFR = 30-44 mL/min/1 73 square meters)    Stage 4 Severe CKD (GFR = 15-29 mL/min/1 73 square meters)    Stage 5 End Stage CKD (GFR <15 mL/min/1 73 square meters)  Note: GFR calculation is accurate only with a steady state creatinine    CBC and differential [[id number]]  (Abnormal) Collected: 06/25/21 1419    Lab Status: Final result Specimen: Blood from Arm, Right Updated: 06/25/21 1426     WBC 14 77 Thousand/uL      RBC 4 78 Million/uL      Hemoglobin 13 3 g/dL      Hematocrit 40 2 %      MCV 84 fL      MCH 27 8 pg      MCHC 33 1 g/dL      RDW 13 9 %      MPV 9 7 fL      Platelets 925 Thousands/uL      Neutrophils Relative 71 %      Immat GRANS % 1 %      Lymphocytes Relative 20 %      Monocytes Relative 7 %      Eosinophils Relative 1 %      Basophils Relative 0 %      Neutrophils Absolute 10 38 Thousands/µL      Immature Grans Absolute 0 11 Thousand/uL      Lymphocytes Absolute 2 98 Thousands/µL      Monocytes Absolute 1 06 Thousand/µL      Eosinophils Absolute 0 20 Thousand/µL      Basophils Absolute 0 04 Thousands/µL     Urine Microscopic [[id number]]  (Abnormal) Collected: 06/25/21 1350    Lab Status: Final result Specimen: Urine, Clean Catch Updated: 06/25/21 1416     RBC, UA 20-30 /hpf      WBC, UA 2-4 /hpf      Epithelial Cells Occasional /hpf      Bacteria, UA Innumerable /hpf      AMORPH URATES Innumerable /hpf     POCT pregnancy, urine [[id number]]  (Normal) Resulted: 06/25/21 1406    Lab Status: Final result Updated: 06/25/21 1406     EXT PREG TEST UR (Ref: Negative) negative     Control valid    UA (URINE) with reflex to Scope [[id number]]  (Abnormal) Collected: 06/25/21 1350    Lab Status: Final result Specimen: Urine, Clean Catch Updated: 06/25/21 1358     Color, UA Suha     Clarity, UA Cloudy     Specific Gravity, UA >=1 030     pH, UA 5 5     Leukocytes, UA Negative     Nitrite, UA Negative     Protein, UA 2+ mg/dl      Glucose, UA Negative mg/dl      Ketones, UA Negative mg/dl      Urobilinogen, UA 0 2 E U /dl      Bilirubin, UA Negative     Blood, UA 3+                 CT renal stone study abdomen pelvis without contrast   Final Result by Ashley Godoy MD (06/25 1522)      5 5 mm obstructing left proximal ureteral calculus causing mild left hydroureteronephrosis  Left kidney is asymmetrically mildly enlarged, correlate with urinalysis for urinary tract infection/pyelonephritis        Hepatic steatosis and hepatomegaly  Workstation performed: [id number]                    Procedures  Procedures         ED Course  ED Course as of Jun 28 2218   Fri Jun 25, 2021   1622 The patient had lab work significant for a white count of 14 77  She has a UA that was leukocyte negative and nitrite negative  It was positive for blood  The patient had a CT scan that showed a proximal 5 5 mm stone  The patient still appears very uncomfortable after her Toradol and morphine  Will discuss with urology  1657 I spoke with Dr Sanchez, who requested the patient be transferred over to [facility] for possible OR        1745 The patient was accepted by Dr Gillian Connelly for transfer to [facility]  SBIRT 22yo+      Most Recent Value   SBIRT (22 yo +)   In order to provide better care to our patients, we are screening all of our patients for alcohol and drug use  Would it be okay to ask you these screening questions? No Filed at: 06/25/2021 1644                    MDM  Number of Diagnoses or Management Options  Nephrolithiasis  Diagnosis management comments: This is a 68-year-old female presented to the emergency department complaining of left flank pain  On initial evaluation the patient no further left flank pain it had she he did have a UA that for blood  During her evaluation she began to have severe left flank pain again  The patient was ordered lab work and a CT scan of her abdomen and pelvis that showed a 5 mm stone that was very proximal   Given the size and location with severe pain I felt the patient was unlikely to pass the stone  The patient was transferred to Dr Malika arnold for further care and evaluation         Amount and/or Complexity of Data Reviewed  Clinical lab tests: reviewed  Tests in the radiology section of CPT®: reviewed        Disposition  Final diagnoses:   Nephrolithiasis     Time reflects when diagnosis was documented in both MDM as applicable and the Disposition within this note     Time User Action Codes Description Comment    6/25/2021  5:16 PM Bon [place] Add [N20 0] Nephrolithiasis       ED Disposition     ED Disposition Condition Date/Time Comment    Transfer to Another Facility  Sat Jun 26, 2021 12:13 AM Sonia Price should be transferred out to [facility]          MD Documentation      Most Recent Value   Patient Condition  The patient has been stabilized such that within reasonable medical probability, no material deterioration of the patient condition or the condition of the unborn child(keith) is likely to result from the transfer   Reason for Transfer  Level of Care needed not available at this facility   Risks of Transfer  Potential for delay in receiving treatment, Potential deterioration of medical condition, Loss of IV, Increased discomfort during transfer   Accepting Physician  Dr Denise Leigh Name, [id number] [facility]    (Name & Tel number)  [facility]   Sending MD  [facility]   Provider Certification  General risk, such as traffic hazards, adverse weather conditions, rough terrain or turbulence, possible failure of equipment (including vehicle or aircraft), or consequences of actions of persons outside the control of the transport personnel, Unanticipated needs of medical equipment and personnel during transport, Risk of worsening condition, The possibility of a transport vehicle being unavailable      RN Documentation      Most Recent Value   Accepting Facility Name, [id number] [facility]    (Name & Tel number)  [facility]      Follow-up Information    None         Discharge Medication List as of 6/26/2021 12:13 AM      CONTINUE these medications which have NOT CHANGED    Details   Cholecalciferol 2000 units CAPS Take 1 capsule by mouth daily, Historical Med      desloratadine (CLARINEX REDITAB) 5 MG disintegrating tablet Take 5 mg by mouth daily, Historical Med      fluticasone (FLONASE SENSIMIST) 27 5 MCG/SPRAY nasal spray 1 spray into each nostril daily, Historical Med      lansoprazole (PREVACID SOLUTAB) 30 mg disintegrating tablet Take 30 mg by mouth daily, Starting Fri 11/9/2018, Historical Med      metoclopramide (REGLAN) 5 mg tablet Take 5 mg by mouth as needed, Starting Sun 11/25/2018, Historical Med      topiramate (TOPAMAX) 100 mg tablet Take 100 mg by mouth 2 (two) times a day, Historical Med      albuterol (PROAIR HFA) 90 mcg/act inhaler Inhale 2 puffs as needed, Historical Med           No discharge procedures on file      PDMP Review     None          ED Provider  Electronically Signed by           Luisito Jaime DO  06/28/21 0939

## 2021-07-09 ENCOUNTER — HOSPITAL ENCOUNTER (EMERGENCY)
Facility: HOSPITAL | Age: 41
Discharge: HOME | End: 2021-07-09
Attending: EMERGENCY MEDICINE
Payer: COMMERCIAL

## 2021-07-09 ENCOUNTER — APPOINTMENT (EMERGENCY)
Dept: RADIOLOGY | Facility: HOSPITAL | Age: 41
End: 2021-07-09
Payer: COMMERCIAL

## 2021-07-09 VITALS
BODY MASS INDEX: 48.79 KG/M2 | SYSTOLIC BLOOD PRESSURE: 133 MMHG | OXYGEN SATURATION: 99 % | HEIGHT: 59 IN | RESPIRATION RATE: 22 BRPM | HEART RATE: 80 BPM | DIASTOLIC BLOOD PRESSURE: 84 MMHG | WEIGHT: 242 LBS | TEMPERATURE: 98.5 F

## 2021-07-09 DIAGNOSIS — N23 URETERAL COLIC: Primary | ICD-10-CM

## 2021-07-09 LAB
ALBUMIN SERPL BCP-MCNC: 3.4 G/DL (ref 3.5–5)
ALP SERPL-CCNC: 70 U/L (ref 46–116)
ALT SERPL W P-5'-P-CCNC: 34 U/L (ref 12–78)
ANION GAP SERPL CALCULATED.3IONS-SCNC: 9 MMOL/L (ref 4–13)
AST SERPL W P-5'-P-CCNC: 24 U/L (ref 5–45)
BACTERIA UR QL AUTO: ABNORMAL /HPF
BASOPHILS # BLD AUTO: 0.06 THOUSANDS/ΜL (ref 0–0.1)
BASOPHILS NFR BLD AUTO: 1 % (ref 0–1)
BILIRUB SERPL-MCNC: 0.24 MG/DL (ref 0.2–1)
BILIRUB UR QL STRIP: NEGATIVE
BUN SERPL-MCNC: 11 MG/DL (ref 5–25)
CALCIUM ALBUM COR SERPL-MCNC: 9.5 MG/DL (ref 8.3–10.1)
CALCIUM SERPL-MCNC: 9 MG/DL (ref 8.3–10.1)
CAOX CRY URNS QL MICRO: ABNORMAL /HPF
CHLORIDE SERPL-SCNC: 106 MMOL/L (ref 100–108)
CLARITY UR: ABNORMAL
CO2 SERPL-SCNC: 28 MMOL/L (ref 21–32)
COLOR UR: YELLOW
CREAT SERPL-MCNC: 0.97 MG/DL (ref 0.6–1.3)
EOSINOPHIL # BLD AUTO: 0.18 THOUSAND/ΜL (ref 0–0.61)
EOSINOPHIL NFR BLD AUTO: 2 % (ref 0–6)
ERYTHROCYTE [DISTWIDTH] IN BLOOD BY AUTOMATED COUNT: 13.3 % (ref 11.6–15.1)
GFR SERPL CREATININE-BSD FRML MDRD: 74 ML/MIN/1.73SQ M
GLUCOSE SERPL-MCNC: 97 MG/DL (ref 65–140)
GLUCOSE UR STRIP-MCNC: NEGATIVE MG/DL
HCT VFR BLD AUTO: 40.9 % (ref 34.8–46.1)
HGB BLD-MCNC: 13 G/DL (ref 11.5–15.4)
HGB UR QL STRIP.AUTO: ABNORMAL
IMM GRANULOCYTES # BLD AUTO: 0.05 THOUSAND/UL (ref 0–0.2)
IMM GRANULOCYTES NFR BLD AUTO: 0 % (ref 0–2)
KETONES UR STRIP-MCNC: NEGATIVE MG/DL
LEUKOCYTE ESTERASE UR QL STRIP: NEGATIVE
LYMPHOCYTES # BLD AUTO: 2.94 THOUSANDS/ΜL (ref 0.6–4.47)
LYMPHOCYTES NFR BLD AUTO: 26 % (ref 14–44)
MCH RBC QN AUTO: 28 PG (ref 26.8–34.3)
MCHC RBC AUTO-ENTMCNC: 31.8 G/DL (ref 31.4–37.4)
MCV RBC AUTO: 88 FL (ref 82–98)
MONOCYTES # BLD AUTO: 0.75 THOUSAND/ΜL (ref 0.17–1.22)
MONOCYTES NFR BLD AUTO: 7 % (ref 4–12)
NEUTROPHILS # BLD AUTO: 7.47 THOUSANDS/ΜL (ref 1.85–7.62)
NEUTS SEG NFR BLD AUTO: 64 % (ref 43–75)
NITRITE UR QL STRIP: NEGATIVE
NON-SQ EPI CELLS URNS QL MICRO: ABNORMAL /HPF
NRBC BLD AUTO-RTO: 0 /100 WBCS
PH UR STRIP.AUTO: 6.5 [PH]
PLATELET # BLD AUTO: 340 THOUSANDS/UL (ref 149–390)
PMV BLD AUTO: 9.7 FL (ref 8.9–12.7)
POTASSIUM SERPL-SCNC: 4 MMOL/L (ref 3.5–5.3)
PROT SERPL-MCNC: 7.2 G/DL (ref 6.4–8.2)
PROT UR STRIP-MCNC: ABNORMAL MG/DL
RBC # BLD AUTO: 4.64 MILLION/UL (ref 3.81–5.12)
RBC #/AREA URNS AUTO: ABNORMAL /HPF
SODIUM SERPL-SCNC: 143 MMOL/L (ref 136–145)
SP GR UR STRIP.AUTO: 1.02 (ref 1–1.03)
UROBILINOGEN UR QL STRIP.AUTO: 0.2 E.U./DL
WBC # BLD AUTO: 11.45 THOUSAND/UL (ref 4.31–10.16)
WBC #/AREA URNS AUTO: ABNORMAL /HPF

## 2021-07-09 PROCEDURE — 36415 COLL VENOUS BLD VENIPUNCTURE: CPT | Performed by: EMERGENCY MEDICINE

## 2021-07-09 PROCEDURE — 81001 URINALYSIS AUTO W/SCOPE: CPT | Performed by: EMERGENCY MEDICINE

## 2021-07-09 PROCEDURE — 99284 EMERGENCY DEPT VISIT MOD MDM: CPT

## 2021-07-09 PROCEDURE — 80053 COMPREHEN METABOLIC PANEL: CPT | Performed by: EMERGENCY MEDICINE

## 2021-07-09 PROCEDURE — 96374 THER/PROPH/DIAG INJ IV PUSH: CPT

## 2021-07-09 PROCEDURE — 74018 RADEX ABDOMEN 1 VIEW: CPT

## 2021-07-09 PROCEDURE — 96375 TX/PRO/DX INJ NEW DRUG ADDON: CPT

## 2021-07-09 PROCEDURE — 99285 EMERGENCY DEPT VISIT HI MDM: CPT | Performed by: EMERGENCY MEDICINE

## 2021-07-09 PROCEDURE — 85025 COMPLETE CBC W/AUTO DIFF WBC: CPT | Performed by: EMERGENCY MEDICINE

## 2021-07-09 RX ORDER — MORPHINE SULFATE 4 MG/ML
4 INJECTION, SOLUTION INTRAMUSCULAR; INTRAVENOUS ONCE
Status: COMPLETED | OUTPATIENT
Start: 2021-07-09 | End: 2021-07-09

## 2021-07-09 RX ORDER — KETOROLAC TROMETHAMINE 30 MG/ML
15 INJECTION, SOLUTION INTRAMUSCULAR; INTRAVENOUS ONCE
Status: COMPLETED | OUTPATIENT
Start: 2021-07-09 | End: 2021-07-09

## 2021-07-09 RX ORDER — ONDANSETRON 2 MG/ML
4 INJECTION INTRAMUSCULAR; INTRAVENOUS ONCE
Status: COMPLETED | OUTPATIENT
Start: 2021-07-09 | End: 2021-07-09

## 2021-07-09 RX ADMIN — ONDANSETRON 4 MG: 2 INJECTION INTRAMUSCULAR; INTRAVENOUS at 13:19

## 2021-07-09 RX ADMIN — MORPHINE SULFATE 4 MG: 4 INJECTION INTRAVENOUS at 13:20

## 2021-07-09 RX ADMIN — KETOROLAC TROMETHAMINE 15 MG: 30 INJECTION, SOLUTION INTRAMUSCULAR at 13:20

## 2021-07-09 NOTE — ED PROVIDER NOTES
History  Chief Complaint   Patient presents with    Flank Pain     Pt reports having "bladder stents" removed this morning at about 8:30 by Dr Tamia Richter  Pt reports calling Dr Tamia Richter' office and was advised to come to ER  Pt reports having kidney stones two weeks ago, was hospitalized for it, and had stones "blasted" wednesday  Pt reports taking motrin and percocet prior to ER with continued pain of 10/10  Patient in the ER with complaint of worsening left flank pain that began after stent removal   Patient was recently diagnosed with a 5 mm ureteral stone, had a cystoscopy and stent placement, and stent was removed by urologist earlier today  Patient states that she has no relief from Percocet taken at 11:15 a m  Today  Pain does not radiate into her groin as it previously did  She complains of nausea without vomiting  Patient is in moderate distress at time of initial evaluation  Patient has a prior medical history of anxiety, gastroparesis, migraines, pseudotumor cerebri  History provided by:  Patient  History limited by:  Acuity of condition   used: No    Flank Pain  Pain location:  L flank  Pain quality: aching    Pain radiates to:  Does not radiate  Pain severity:  Moderate  Onset quality:  Sudden  Timing:  Constant  Progression:  Worsening  Chronicity:  Recurrent  Relieved by:  Nothing  Worsened by:  Nothing  Ineffective treatments:  OTC medications  Associated symptoms: nausea    Associated symptoms: no chills, no cough, no diarrhea, no dysuria, no fever, no hematuria, no shortness of breath and no vomiting        Prior to Admission Medications   Prescriptions Last Dose Informant Patient Reported? Taking?    ALPRAZolam (XANAX) 0 25 mg tablet   No No   Sig: Take 1 tablet (0 25 mg total) by mouth daily at bedtime as needed (second line bladder spasm)   desloratadine (CLARINEX REDITAB) 5 MG disintegrating tablet  Self Yes No   Sig: Take 10 mg by mouth daily fluticasone (FLONASE SENSIMIST) 27 5 MCG/SPRAY nasal spray  Self Yes No   Si sprays into each nostril daily    lansoprazole (PREVACID SOLUTAB) 30 mg disintegrating tablet  Self Yes No   Sig: Take 30 mg by mouth 2 (two) times a day    metoclopramide (REGLAN) 5 mg tablet  Self Yes No   Sig: Take 5 mg by mouth 3 (three) times a day as needed    tamsulosin (FLOMAX) 0 4 mg   No No   Sig: Take 1 capsule (0 4 mg total) by mouth daily with dinner      Facility-Administered Medications: None       Past Medical History:   Diagnosis Date    Gastroparesis     Migraine     Pseudotumor cerebri     Renal disorder     kidney stones       Past Surgical History:   Procedure Laterality Date    CARPAL TUNNEL RELEASE Right     FL RETROGRADE PYELOGRAM  2021    [place] CYSTO/URETERO W/LITHOTRIPSY &INDWELL STENT INSRT Left 2021    Procedure: CYSTOSCOPY URETEROSCOPY WITH , RETROGRADE PYELOGRAM AND INSERTION STENT URETERAL;  Surgeon: Angela Browning MD;  Location: [address] [place] [address];  Service: Urology    [place] CYSTO/URETERO W/LITHOTRIPSY &INDWELL STENT INSRT Left 7/10/2021    Procedure: CYSTOSCOPY, URETEROSCOPY, STONE MANIPULATION WITH BASKET EXTRACTION, RETROGRADE PYELOGRAM AND INSERTION STENT URETERAL;  Surgeon: Angela Browning MD;  Location: [facility];  Service: Urology       History reviewed  No pertinent family history  I have reviewed and agree with the history as documented  E-Cigarette/Vaping    E-Cigarette Use Never User      E-Cigarette/Vaping Substances     Social History     Tobacco Use    Smoking status: Never Smoker    Smokeless tobacco: Never Used   Vaping Use    Vaping Use: Never used   Substance Use Topics    Alcohol use: Never    Drug use: Never       Review of Systems   Constitutional: Negative for chills and fever  Respiratory: Negative for cough, chest tightness and shortness of breath  Gastrointestinal: Positive for nausea  Negative for abdominal pain, diarrhea and vomiting  Genitourinary: Positive for flank pain  Negative for dysuria, frequency, hematuria and urgency  Musculoskeletal: Negative for back pain, neck pain and neck stiffness  Skin: Negative for color change, pallor, rash and wound  All other systems reviewed and are negative  Physical Exam  Physical Exam  Vitals and nursing note reviewed  Constitutional:       General: She is not in acute distress  Appearance: She is well-developed  She is not diaphoretic  HENT:      Head: Normocephalic and atraumatic  Eyes:      Conjunctiva/sclera: Conjunctivae normal       Pupils: Pupils are equal, round, and reactive to light  Cardiovascular:      Rate and Rhythm: Normal rate and regular rhythm  Heart sounds: Normal heart sounds  No murmur heard  Pulmonary:      Effort: Pulmonary effort is normal  No respiratory distress  Breath sounds: Normal breath sounds  Abdominal:      General: Bowel sounds are normal  There is no distension  Palpations: Abdomen is soft  Tenderness: There is abdominal tenderness  There is left CVA tenderness  There is no right CVA tenderness  Musculoskeletal:         General: No deformity  Normal range of motion  Cervical back: Normal range of motion and neck supple  Skin:     General: Skin is warm and dry  Capillary Refill: Capillary refill takes less than 2 seconds  Coloration: Skin is not pale  Findings: No rash  Neurological:      General: No focal deficit present  Mental Status: She is alert and oriented to person, place, and time  Cranial Nerves: No cranial nerve deficit  Psychiatric:         Mood and Affect: Mood is anxious  Behavior: Behavior normal          Cognition and Memory: She does not exhibit impaired recent memory or impaired remote memory  Judgment: Judgment is not impulsive or inappropriate           Vital Signs  ED Triage Vitals [07/09/21 1226]   Temperature Pulse Respirations Blood Pressure SpO2   98 7 °F (37 1 °C) 92 (!) 24 142/92 98 %      Temp Source Heart Rate Source Patient Position - Orthostatic VS BP Location FiO2 (%)   Tympanic Monitor Lying Right arm --      Pain Score       Worst Possible Pain           Vitals:    07/09/21 1226 07/09/21 1459   BP: 142/92 133/84   Pulse: 92 80   Patient Position - Orthostatic VS: Lying Lying         Visual Acuity      ED Medications  Medications   ondansetron (ZOFRAN) injection 4 mg (4 mg Intravenous Given 7/9/21 1319)   ketorolac (TORADOL) injection 15 mg (15 mg Intravenous Given 7/9/21 1320)   morphine (PF) 4 mg/mL injection 4 mg (4 mg Intravenous Given 7/9/21 1320)       Diagnostic Studies  Results Reviewed     Procedure Component Value Units Date/Time    Urine Microscopic [[id number]]  (Abnormal) Collected: 07/09/21 1401    Lab Status: Final result Specimen: Urine, Other Updated: 07/09/21 1433     RBC, UA Innumerable /hpf      WBC, UA 4-10 /hpf      Epithelial Cells Occasional /hpf      Bacteria, UA None Seen /hpf      Ca Oxalate Dina, UA Occasional /hpf     UA w Reflex to Microscopic w Reflex to Culture [[id number]]  (Abnormal) Collected: 07/09/21 1401    Lab Status: Final result Specimen: Urine, Other Updated: 07/09/21 1408     Color, UA Yellow     Clarity, UA Slightly Cloudy     Specific [place], UA 1 020     pH, UA 6 5     Leukocytes, UA Negative     Nitrite, UA Negative     Protein,  (2+) mg/dl      Glucose, UA Negative mg/dl      Ketones, UA Negative mg/dl      Urobilinogen, UA 0 2 E U /dl      Bilirubin, UA Negative     Blood, UA Large    Comprehensive metabolic panel [[id number]]  (Abnormal) Collected: 07/09/21 1319    Lab Status: Final result Specimen: Blood from Arm, Left Updated: 07/09/21 1345     Sodium 143 mmol/L      Potassium 4 0 mmol/L      Chloride 106 mmol/L      CO2 28 mmol/L      ANION GAP 9 mmol/L      BUN 11 mg/dL      Creatinine 0 97 mg/dL      Glucose 97 mg/dL      Calcium 9 0 mg/dL      Corrected Calcium 9 5 mg/dL      AST 24 U/L      ALT 34 U/L      Alkaline Phosphatase 70 U/L      Total Protein 7 2 g/dL      Albumin 3 4 g/dL      Total Bilirubin 0 24 mg/dL      eGFR 74 ml/min/1 73sq m     Narrative:      [place] guidelines for Chronic Kidney Disease (CKD):     Stage 1 with normal or high GFR (GFR > 90 mL/min/1 73 square meters)    Stage 2 Mild CKD (GFR = 60-89 mL/min/1 73 square meters)    Stage 3A Moderate CKD (GFR = 45-59 mL/min/1 73 square meters)    Stage 3B Moderate CKD (GFR = 30-44 mL/min/1 73 square meters)    Stage 4 Severe CKD (GFR = 15-29 mL/min/1 73 square meters)    Stage 5 End Stage CKD (GFR <15 mL/min/1 73 square meters)  Note: GFR calculation is accurate only with a steady state creatinine    CBC and differential [[id number]]  (Abnormal) Collected: 07/09/21 1319    Lab Status: Final result Specimen: Blood from Arm, Left Updated: 07/09/21 1327     WBC 11 45 Thousand/uL      RBC 4 64 Million/uL      Hemoglobin 13 0 g/dL      Hematocrit 40 9 %      MCV 88 fL      MCH 28 0 pg      MCHC 31 8 g/dL      RDW 13 3 %      MPV 9 7 fL      Platelets 337 Thousands/uL      nRBC 0 /100 WBCs      Neutrophils Relative 64 %      Immat GRANS % 0 %      Lymphocytes Relative 26 %      Monocytes Relative 7 %      Eosinophils Relative 2 %      Basophils Relative 1 %      Neutrophils Absolute 7 47 Thousands/µL      Immature Grans Absolute 0 05 Thousand/uL      Lymphocytes Absolute 2 94 Thousands/µL      Monocytes Absolute 0 75 Thousand/µL      Eosinophils Absolute 0 18 Thousand/µL      Basophils Absolute 0 06 Thousands/µL                  XR abdomen 1 view kub   Final Result by Birgit Sifuentes MD (07/09 1456)   No evidence of left sided calculus status post stent removal            Workstation performed: [id number]                    Procedures  Procedures         ED Course         pt in the ER with left flank pain  Labs/xray ordered  Pain meds administered   Pt reevaluated, now seated upright, on her phone, in no distress  I reviewed pt with Dr Santi Zacarias, who states that she may be discharged if she is improved, but if symptoms worsen, she may return to [facility]  Pt agrees with plan  MDM  Number of Diagnoses or Management Options  Ureteral colic: new and requires workup     Amount and/or Complexity of Data Reviewed  Clinical lab tests: ordered and reviewed  Tests in the radiology section of CPT®: ordered and reviewed    Risk of Complications, Morbidity, and/or Mortality  Presenting problems: high  Diagnostic procedures: high  Management options: high    Patient Progress  Patient progress: improved      Disposition  Final diagnoses:   Ureteral colic     Time reflects when diagnosis was documented in both MDM as applicable and the Disposition within this note     Time User Action Codes Description Comment    7/9/2021  2:40 PM Sarita Villalobos Add [O43] Ureteral colic       ED Disposition     ED Disposition Condition Date/Time Comment    Discharge Stable Fri Jul 9, 2021  2:40 PM Angelo Ngo discharge to home/self care              Follow-up Information     Follow up With Specialties Details Why Contact Info    Gustabo Singh DO Family Medicine Schedule an appointment as soon as possible for a visit in 2 days for follow up [address] [place] [address]  [phone]      Maryjane Tarango MD Urology Schedule an appointment as soon as possible for a visit in 2 days for follow up [address] [facility]  [phone]            Discharge Medication List as of 7/9/2021  2:44 PM      CONTINUE these medications which have NOT CHANGED    Details   ALPRAZolam (XANAX) 0 25 mg tablet Take 1 tablet (0 25 mg total) by mouth daily at bedtime as needed (second line bladder spasm), Starting Sun 6/27/2021, Normal      desloratadine (CLARINEX REDITAB) 5 MG disintegrating tablet Take 10 mg by mouth daily , Historical Med      fluticasone (FLONASE SENSIMIST) 27 5 MCG/SPRAY nasal spray 2 sprays into each nostril daily , Historical Med      lansoprazole (PREVACID SOLUTAB) 30 mg disintegrating tablet Take 30 mg by mouth 2 (two) times a day , Starting Fri 11/9/2018, Historical Med      metoclopramide (REGLAN) 5 mg tablet Take 5 mg by mouth 3 (three) times a day as needed , Starting Sun 11/25/2018, Historical Med      tamsulosin (FLOMAX) 0 4 mg Take 1 capsule (0 4 mg total) by mouth daily with dinner, Starting Sun 6/27/2021, Normal      Cholecalciferol 2000 units CAPS Take 2 capsules by mouth daily , Historical Med      oxyCODONE-acetaminophen (PERCOCET) 5-325 mg per tablet Take 1 tablet by mouth every 8 (eight) hours as needed for moderate painMax Daily Amount: 3 tablets, Starting Sun 6/27/2021, Normal      phenazopyridine (PYRIDIUM) 100 mg tablet Take 1 tablet (100 mg total) by mouth 3 (three) times a day as needed for bladder spasms, Starting Sun 6/27/2021, Normal      topiramate (TOPAMAX) 100 mg tablet Take 100 mg by mouth daily , Historical Med           No discharge procedures on file      PDMP Review     None          ED Provider  Electronically Signed by           Evy Jc DO  07/13/21 2181

## 2021-07-10 ENCOUNTER — ANESTHESIA (OUTPATIENT)
Dept: PERIOP | Facility: HOSPITAL | Age: 41
End: 2021-07-10
Payer: COMMERCIAL

## 2021-07-10 ENCOUNTER — HOSPITAL ENCOUNTER (OUTPATIENT)
Facility: HOSPITAL | Age: 41
Setting detail: OBSERVATION
Discharge: HOME | End: 2021-07-10
Attending: EMERGENCY MEDICINE | Admitting: INTERNAL MEDICINE
Payer: COMMERCIAL

## 2021-07-10 ENCOUNTER — APPOINTMENT (OUTPATIENT)
Dept: RADIOLOGY | Facility: HOSPITAL | Age: 41
End: 2021-07-10
Payer: COMMERCIAL

## 2021-07-10 ENCOUNTER — APPOINTMENT (EMERGENCY)
Dept: RADIOLOGY | Facility: HOSPITAL | Age: 41
End: 2021-07-10
Payer: COMMERCIAL

## 2021-07-10 ENCOUNTER — ANESTHESIA EVENT (OUTPATIENT)
Dept: PERIOP | Facility: HOSPITAL | Age: 41
End: 2021-07-10
Payer: COMMERCIAL

## 2021-07-10 ENCOUNTER — APPOINTMENT (OUTPATIENT)
Dept: RADIOLOGY | Facility: CLINIC | Age: 41
End: 2021-07-10
Payer: COMMERCIAL

## 2021-07-10 VITALS
TEMPERATURE: 97.9 F | RESPIRATION RATE: 16 BRPM | HEART RATE: 95 BPM | DIASTOLIC BLOOD PRESSURE: 80 MMHG | BODY MASS INDEX: 49.08 KG/M2 | SYSTOLIC BLOOD PRESSURE: 134 MMHG | WEIGHT: 243 LBS | OXYGEN SATURATION: 92 %

## 2021-07-10 DIAGNOSIS — N20.0 NEPHROLITHIASIS: Primary | ICD-10-CM

## 2021-07-10 DIAGNOSIS — N23 RENAL COLIC ON LEFT SIDE: ICD-10-CM

## 2021-07-10 DIAGNOSIS — N13.4 HYDROURETER: ICD-10-CM

## 2021-07-10 DIAGNOSIS — N20.1 URETERAL STONE: ICD-10-CM

## 2021-07-10 DIAGNOSIS — R10.9 LEFT FLANK PAIN: ICD-10-CM

## 2021-07-10 LAB
ALBUMIN SERPL BCP-MCNC: 3.2 G/DL (ref 3.5–5)
ALP SERPL-CCNC: 66 U/L (ref 46–116)
ALT SERPL W P-5'-P-CCNC: 34 U/L (ref 12–78)
ANION GAP SERPL CALCULATED.3IONS-SCNC: 9 MMOL/L (ref 4–13)
AST SERPL W P-5'-P-CCNC: 19 U/L (ref 5–45)
BACTERIA UR QL AUTO: ABNORMAL /HPF
BASOPHILS # BLD AUTO: 0.07 THOUSANDS/ΜL (ref 0–0.1)
BASOPHILS NFR BLD AUTO: 1 % (ref 0–1)
BILIRUB SERPL-MCNC: 0.24 MG/DL (ref 0.2–1)
BILIRUB UR QL STRIP: NEGATIVE
BUN SERPL-MCNC: 13 MG/DL (ref 5–25)
CALCIUM ALBUM COR SERPL-MCNC: 9.2 MG/DL (ref 8.3–10.1)
CALCIUM SERPL-MCNC: 8.6 MG/DL (ref 8.3–10.1)
CHLORIDE SERPL-SCNC: 105 MMOL/L (ref 100–108)
CLARITY UR: CLEAR
CO2 SERPL-SCNC: 27 MMOL/L (ref 21–32)
COLOR UR: YELLOW
CREAT SERPL-MCNC: 0.88 MG/DL (ref 0.6–1.3)
EOSINOPHIL # BLD AUTO: 0.3 THOUSAND/ΜL (ref 0–0.61)
EOSINOPHIL NFR BLD AUTO: 3 % (ref 0–6)
ERYTHROCYTE [DISTWIDTH] IN BLOOD BY AUTOMATED COUNT: 13.3 % (ref 11.6–15.1)
EXT PREG TEST URINE: NEGATIVE
EXT. CONTROL ED NAV: NORMAL
GFR SERPL CREATININE-BSD FRML MDRD: 84 ML/MIN/1.73SQ M
GLUCOSE SERPL-MCNC: 111 MG/DL (ref 65–140)
GLUCOSE UR STRIP-MCNC: NEGATIVE MG/DL
HCT VFR BLD AUTO: 39.2 % (ref 34.8–46.1)
HGB BLD-MCNC: 12.2 G/DL (ref 11.5–15.4)
HGB UR QL STRIP.AUTO: ABNORMAL
IMM GRANULOCYTES # BLD AUTO: 0.05 THOUSAND/UL (ref 0–0.2)
IMM GRANULOCYTES NFR BLD AUTO: 0 % (ref 0–2)
KETONES UR STRIP-MCNC: NEGATIVE MG/DL
LEUKOCYTE ESTERASE UR QL STRIP: ABNORMAL
LYMPHOCYTES # BLD AUTO: 3.63 THOUSANDS/ΜL (ref 0.6–4.47)
LYMPHOCYTES NFR BLD AUTO: 32 % (ref 14–44)
MAGNESIUM SERPL-MCNC: 2.1 MG/DL (ref 1.6–2.6)
MCH RBC QN AUTO: 27.7 PG (ref 26.8–34.3)
MCHC RBC AUTO-ENTMCNC: 31.1 G/DL (ref 31.4–37.4)
MCV RBC AUTO: 89 FL (ref 82–98)
MONOCYTES # BLD AUTO: 0.95 THOUSAND/ΜL (ref 0.17–1.22)
MONOCYTES NFR BLD AUTO: 8 % (ref 4–12)
NEUTROPHILS # BLD AUTO: 6.44 THOUSANDS/ΜL (ref 1.85–7.62)
NEUTS SEG NFR BLD AUTO: 56 % (ref 43–75)
NITRITE UR QL STRIP: NEGATIVE
NON-SQ EPI CELLS URNS QL MICRO: ABNORMAL /HPF
NRBC BLD AUTO-RTO: 0 /100 WBCS
PH UR STRIP.AUTO: 6 [PH]
PLATELET # BLD AUTO: 311 THOUSANDS/UL (ref 149–390)
PMV BLD AUTO: 9.4 FL (ref 8.9–12.7)
POTASSIUM SERPL-SCNC: 4 MMOL/L (ref 3.5–5.3)
PROT SERPL-MCNC: 6.8 G/DL (ref 6.4–8.2)
PROT UR STRIP-MCNC: NEGATIVE MG/DL
RBC # BLD AUTO: 4.4 MILLION/UL (ref 3.81–5.12)
RBC #/AREA URNS AUTO: ABNORMAL /HPF
SODIUM SERPL-SCNC: 141 MMOL/L (ref 136–145)
SP GR UR STRIP.AUTO: 1.02 (ref 1–1.03)
UROBILINOGEN UR QL STRIP.AUTO: 0.2 E.U./DL
WBC # BLD AUTO: 11.44 THOUSAND/UL (ref 4.31–10.16)
WBC #/AREA URNS AUTO: ABNORMAL /HPF

## 2021-07-10 PROCEDURE — 81001 URINALYSIS AUTO W/SCOPE: CPT | Performed by: EMERGENCY MEDICINE

## 2021-07-10 PROCEDURE — C1769 GUIDE WIRE: HCPCS | Performed by: UROLOGY

## 2021-07-10 PROCEDURE — 99236 HOSP IP/OBS SAME DATE HI 85: CPT | Performed by: INTERNAL MEDICINE

## 2021-07-10 PROCEDURE — 74420 UROGRAPHY RTRGR +-KUB: CPT

## 2021-07-10 PROCEDURE — C2617 STENT, NON-COR, TEM W/O DEL: HCPCS | Performed by: UROLOGY

## 2021-07-10 PROCEDURE — 99285 EMERGENCY DEPT VISIT HI MDM: CPT

## 2021-07-10 PROCEDURE — 36415 COLL VENOUS BLD VENIPUNCTURE: CPT | Performed by: EMERGENCY MEDICINE

## 2021-07-10 PROCEDURE — 82360 CALCULUS ASSAY QUANT: CPT | Performed by: UROLOGY

## 2021-07-10 PROCEDURE — 99285 EMERGENCY DEPT VISIT HI MDM: CPT | Performed by: EMERGENCY MEDICINE

## 2021-07-10 PROCEDURE — 80053 COMPREHEN METABOLIC PANEL: CPT | Performed by: EMERGENCY MEDICINE

## 2021-07-10 PROCEDURE — 96374 THER/PROPH/DIAG INJ IV PUSH: CPT

## 2021-07-10 PROCEDURE — 88300 SURGICAL PATH GROSS: CPT | Performed by: PATHOLOGY

## 2021-07-10 PROCEDURE — 85025 COMPLETE CBC W/AUTO DIFF WBC: CPT | Performed by: EMERGENCY MEDICINE

## 2021-07-10 PROCEDURE — 96361 HYDRATE IV INFUSION ADD-ON: CPT

## 2021-07-10 PROCEDURE — G1004 CDSM NDSC: HCPCS

## 2021-07-10 PROCEDURE — 96375 TX/PRO/DX INJ NEW DRUG ADDON: CPT

## 2021-07-10 PROCEDURE — 81025 URINE PREGNANCY TEST: CPT | Performed by: EMERGENCY MEDICINE

## 2021-07-10 PROCEDURE — 83735 ASSAY OF MAGNESIUM: CPT | Performed by: EMERGENCY MEDICINE

## 2021-07-10 PROCEDURE — C1894 INTRO/SHEATH, NON-LASER: HCPCS | Performed by: UROLOGY

## 2021-07-10 PROCEDURE — 74176 CT ABD & PELVIS W/O CONTRAST: CPT

## 2021-07-10 DEVICE — INLAY URETERAL STENT W/O GUIDEWIRE
Type: IMPLANTABLE DEVICE | Site: URETER | Status: FUNCTIONAL
Brand: BARD® INLAY® URETERAL STENT

## 2021-07-10 RX ORDER — KETOROLAC TROMETHAMINE 30 MG/ML
15 INJECTION, SOLUTION INTRAMUSCULAR; INTRAVENOUS ONCE
Status: COMPLETED | OUTPATIENT
Start: 2021-07-10 | End: 2021-07-10

## 2021-07-10 RX ORDER — PROPOFOL 10 MG/ML
INJECTION, EMULSION INTRAVENOUS AS NEEDED
Status: DISCONTINUED | OUTPATIENT
Start: 2021-07-10 | End: 2021-07-10

## 2021-07-10 RX ORDER — FENTANYL CITRATE 50 UG/ML
INJECTION, SOLUTION INTRAMUSCULAR; INTRAVENOUS AS NEEDED
Status: DISCONTINUED | OUTPATIENT
Start: 2021-07-10 | End: 2021-07-10

## 2021-07-10 RX ORDER — MORPHINE SULFATE 4 MG/ML
4 INJECTION, SOLUTION INTRAMUSCULAR; INTRAVENOUS EVERY 4 HOURS PRN
Status: DISCONTINUED | OUTPATIENT
Start: 2021-07-10 | End: 2021-07-10 | Stop reason: HOSPADM

## 2021-07-10 RX ORDER — CEFADROXIL 500 MG/1
500 CAPSULE ORAL EVERY 12 HOURS SCHEDULED
Qty: 6 CAPSULE | Refills: 0 | Status: SHIPPED | OUTPATIENT
Start: 2021-07-10 | End: 2021-07-13

## 2021-07-10 RX ORDER — CEFADROXIL 500 MG/1
500 CAPSULE ORAL EVERY 12 HOURS SCHEDULED
Status: ON HOLD | COMMUNITY
End: 2021-07-10 | Stop reason: SDUPTHER

## 2021-07-10 RX ORDER — OXYCODONE HYDROCHLORIDE AND ACETAMINOPHEN 5; 325 MG/1; MG/1
1 TABLET ORAL EVERY 8 HOURS PRN
Qty: 10 TABLET | Refills: 0 | Status: SHIPPED | OUTPATIENT
Start: 2021-07-10 | End: 2021-07-10 | Stop reason: SDUPTHER

## 2021-07-10 RX ORDER — ALPRAZOLAM 0.25 MG/1
0.25 TABLET ORAL
Status: DISCONTINUED | OUTPATIENT
Start: 2021-07-10 | End: 2021-07-10 | Stop reason: HOSPADM

## 2021-07-10 RX ORDER — MORPHINE SULFATE 4 MG/ML
4 INJECTION, SOLUTION INTRAMUSCULAR; INTRAVENOUS EVERY 2 HOUR PRN
Status: DISCONTINUED | OUTPATIENT
Start: 2021-07-10 | End: 2021-07-10 | Stop reason: HOSPADM

## 2021-07-10 RX ORDER — FENTANYL CITRATE/PF 50 MCG/ML
25 SYRINGE (ML) INJECTION
Status: DISCONTINUED | OUTPATIENT
Start: 2021-07-10 | End: 2021-07-10 | Stop reason: HOSPADM

## 2021-07-10 RX ORDER — KETOROLAC TROMETHAMINE 30 MG/ML
15 INJECTION, SOLUTION INTRAMUSCULAR; INTRAVENOUS EVERY 6 HOURS PRN
Status: DISCONTINUED | OUTPATIENT
Start: 2021-07-10 | End: 2021-07-10 | Stop reason: SDUPTHER

## 2021-07-10 RX ORDER — TAMSULOSIN HYDROCHLORIDE 0.4 MG/1
0.4 CAPSULE ORAL
Status: DISCONTINUED | OUTPATIENT
Start: 2021-07-10 | End: 2021-07-10 | Stop reason: HOSPADM

## 2021-07-10 RX ORDER — ONDANSETRON 2 MG/ML
4 INJECTION INTRAMUSCULAR; INTRAVENOUS ONCE
Status: COMPLETED | OUTPATIENT
Start: 2021-07-10 | End: 2021-07-10

## 2021-07-10 RX ORDER — SODIUM CHLORIDE 9 MG/ML
125 INJECTION, SOLUTION INTRAVENOUS CONTINUOUS
Status: DISCONTINUED | OUTPATIENT
Start: 2021-07-10 | End: 2021-07-10 | Stop reason: HOSPADM

## 2021-07-10 RX ORDER — KETOROLAC TROMETHAMINE 30 MG/ML
30 INJECTION, SOLUTION INTRAMUSCULAR; INTRAVENOUS EVERY 6 HOURS PRN
Status: DISCONTINUED | OUTPATIENT
Start: 2021-07-10 | End: 2021-07-10 | Stop reason: HOSPADM

## 2021-07-10 RX ORDER — CEFAZOLIN SODIUM 2 G/50ML
2000 SOLUTION INTRAVENOUS ONCE
Status: COMPLETED | OUTPATIENT
Start: 2021-07-10 | End: 2021-07-10

## 2021-07-10 RX ORDER — MAGNESIUM HYDROXIDE 1200 MG/15ML
LIQUID ORAL AS NEEDED
Status: DISCONTINUED | OUTPATIENT
Start: 2021-07-10 | End: 2021-07-10 | Stop reason: HOSPADM

## 2021-07-10 RX ORDER — ONDANSETRON 2 MG/ML
4 INJECTION INTRAMUSCULAR; INTRAVENOUS ONCE AS NEEDED
Status: DISCONTINUED | OUTPATIENT
Start: 2021-07-10 | End: 2021-07-10 | Stop reason: HOSPADM

## 2021-07-10 RX ORDER — MORPHINE SULFATE 4 MG/ML
4 INJECTION, SOLUTION INTRAMUSCULAR; INTRAVENOUS EVERY 4 HOURS PRN
Status: DISCONTINUED | OUTPATIENT
Start: 2021-07-10 | End: 2021-07-10

## 2021-07-10 RX ORDER — OXYCODONE HYDROCHLORIDE AND ACETAMINOPHEN 5; 325 MG/1; MG/1
1 TABLET ORAL EVERY 4 HOURS PRN
Status: DISCONTINUED | OUTPATIENT
Start: 2021-07-10 | End: 2021-07-10 | Stop reason: HOSPADM

## 2021-07-10 RX ORDER — OXYCODONE HYDROCHLORIDE AND ACETAMINOPHEN 5; 325 MG/1; MG/1
1 TABLET ORAL EVERY 8 HOURS PRN
Qty: 8 TABLET | Refills: 0 | Status: SHIPPED | OUTPATIENT
Start: 2021-07-10 | End: 2022-05-06 | Stop reason: HOSPADM

## 2021-07-10 RX ORDER — HYDROMORPHONE HCL/PF 1 MG/ML
1 SYRINGE (ML) INJECTION ONCE
Status: COMPLETED | OUTPATIENT
Start: 2021-07-10 | End: 2021-07-10

## 2021-07-10 RX ORDER — CEFADROXIL 500 MG/1
500 CAPSULE ORAL EVERY 12 HOURS SCHEDULED
Qty: 6 CAPSULE | Refills: 0 | Status: SHIPPED | OUTPATIENT
Start: 2021-07-10 | End: 2021-07-10 | Stop reason: SDUPTHER

## 2021-07-10 RX ORDER — METOCLOPRAMIDE HYDROCHLORIDE 5 MG/ML
INJECTION INTRAMUSCULAR; INTRAVENOUS AS NEEDED
Status: DISCONTINUED | OUTPATIENT
Start: 2021-07-10 | End: 2021-07-10

## 2021-07-10 RX ORDER — DIPHENHYDRAMINE HYDROCHLORIDE 50 MG/ML
INJECTION INTRAMUSCULAR; INTRAVENOUS AS NEEDED
Status: DISCONTINUED | OUTPATIENT
Start: 2021-07-10 | End: 2021-07-10

## 2021-07-10 RX ORDER — ONDANSETRON 2 MG/ML
4 INJECTION INTRAMUSCULAR; INTRAVENOUS EVERY 6 HOURS PRN
Status: DISCONTINUED | OUTPATIENT
Start: 2021-07-10 | End: 2021-07-10 | Stop reason: HOSPADM

## 2021-07-10 RX ORDER — MIDAZOLAM HYDROCHLORIDE 2 MG/2ML
INJECTION, SOLUTION INTRAMUSCULAR; INTRAVENOUS AS NEEDED
Status: DISCONTINUED | OUTPATIENT
Start: 2021-07-10 | End: 2021-07-10

## 2021-07-10 RX ORDER — LORATADINE 10 MG/1
10 TABLET ORAL DAILY
Status: DISCONTINUED | OUTPATIENT
Start: 2021-07-10 | End: 2021-07-10 | Stop reason: HOSPADM

## 2021-07-10 RX ORDER — FLUTICASONE PROPIONATE 50 MCG
1 SPRAY, SUSPENSION (ML) NASAL DAILY
Status: DISCONTINUED | OUTPATIENT
Start: 2021-07-10 | End: 2021-07-10 | Stop reason: HOSPADM

## 2021-07-10 RX ORDER — PANTOPRAZOLE SODIUM 40 MG/1
40 TABLET, DELAYED RELEASE ORAL
Status: DISCONTINUED | OUTPATIENT
Start: 2021-07-10 | End: 2021-07-10 | Stop reason: HOSPADM

## 2021-07-10 RX ADMIN — TAMSULOSIN HYDROCHLORIDE 0.4 MG: 0.4 CAPSULE ORAL at 16:57

## 2021-07-10 RX ADMIN — MORPHINE SULFATE 4 MG: 4 INJECTION INTRAVENOUS at 08:50

## 2021-07-10 RX ADMIN — ONDANSETRON 4 MG: 2 INJECTION INTRAMUSCULAR; INTRAVENOUS at 02:29

## 2021-07-10 RX ADMIN — PROPOFOL 200 MG: 10 INJECTION, EMULSION INTRAVENOUS at 12:19

## 2021-07-10 RX ADMIN — CEFAZOLIN SODIUM 2000 MG: 2 SOLUTION INTRAVENOUS at 10:25

## 2021-07-10 RX ADMIN — MIDAZOLAM 2 MG: 1 INJECTION INTRAMUSCULAR; INTRAVENOUS at 12:16

## 2021-07-10 RX ADMIN — KETOROLAC TROMETHAMINE 15 MG: 30 INJECTION, SOLUTION INTRAMUSCULAR at 02:29

## 2021-07-10 RX ADMIN — SODIUM CHLORIDE 125 ML/HR: 0.9 INJECTION, SOLUTION INTRAVENOUS at 10:26

## 2021-07-10 RX ADMIN — KETOROLAC TROMETHAMINE 30 MG: 30 INJECTION, SOLUTION INTRAMUSCULAR at 10:25

## 2021-07-10 RX ADMIN — DIPHENHYDRAMINE HYDROCHLORIDE 25 MG: 50 INJECTION INTRAMUSCULAR; INTRAVENOUS at 12:14

## 2021-07-10 RX ADMIN — METOCLOPRAMIDE HYDROCHLORIDE 10 MG: 5 INJECTION INTRAMUSCULAR; INTRAVENOUS at 12:14

## 2021-07-10 RX ADMIN — ONDANSETRON 4 MG: 2 INJECTION INTRAMUSCULAR; INTRAVENOUS at 08:55

## 2021-07-10 RX ADMIN — SODIUM CHLORIDE 1000 ML: 0.9 INJECTION, SOLUTION INTRAVENOUS at 02:29

## 2021-07-10 RX ADMIN — HYDROMORPHONE HYDROCHLORIDE 1 MG: 1 INJECTION, SOLUTION INTRAMUSCULAR; INTRAVENOUS; SUBCUTANEOUS at 07:10

## 2021-07-10 RX ADMIN — FENTANYL CITRATE 50 MCG: 50 INJECTION, SOLUTION INTRAMUSCULAR; INTRAVENOUS at 12:19

## 2021-07-10 RX ADMIN — FLUTICASONE PROPIONATE 1 SPRAY: 50 SPRAY, METERED NASAL at 10:25

## 2021-07-10 NOTE — DISCHARGE SUMMARY
[place] 47  Discharge- Lazarus Longest 1980, 47 y o  female MRN: [id number]  Unit/Bed#: [address] [place] [address] Encounter: [id number]  Primary Care Provider: Aniyah Quiroga DO   Date and time admitted to hospital: 7/10/2021  1:07 AM    * Renal colic on left side  Assessment & Plan  Patient presented with left flank pain  Patient had recent hospitalization about a couple of weeks ago with left-sided nephrolithiasis status post stent placement which was removed yesterday  Patient was treated with IV fluids and NPO status  Patient underwent cystoscopy stone basketing and left ureteral stent placement  Patient to be discharged on cefadroxil and Percocet  CT scan of the abdomen showed moderately obstructing 3 millimeter proximal left ureteral calculus just below the ureteropelvic junction with mild left-sided hydroureter up to urinary bladder without any distal ureteral calculi and 5 millimeter nonobstructing calyceal callus +in the left lower pole of the kidney      Pseudotumor  Assessment & Plan  Outpatient follow-up with Neurology and Ophthalmology    Gastroparesis  Assessment & Plan  Continue Reglan p r n  When diet is restarted  Lansoprazole substituted with Protonix        Discharging Physician / Practitioner: Loretta Barahona MD  PCP: Aniyah Quiroga DO  Admission Date:   Admission Orders (From admission, onward)     Ordered        07/10/21 0652  Place in Observation  Once                   Discharge Date: 07/10/21    Medical Problems     Resolved Problems  Date Reviewed: 7/10/2021    None                Consultations During Hospital Stay:  · Urology     Outpatient Tests Requested: Follow-up with Urology    Complications:  None    Reason for Admission:  Left flank pain    Hospital Course:     Lazarus Longest is a 47 y o  female patient with past medical history of obesity, sleep apnea,migraine, pseudotumor cerebri  who originally presented to the hospital on 7/10/2021 due to left  flank pain  Patient noted to have 3 millimeter obstructing stone on the left  Patient was seen by urology and underwent cystoscopy with stone basketing and stent placement  Patient remained stable and was discharged home on antibiotics and pain medication      Please see above list of diagnoses and related plan for additional information  Condition at Discharge: stable     Discharge Day Visit / Exam:     Subjective:  Patient urinated well  Vitals: Blood Pressure: 134/80 (07/10/21 1350)  Pulse: 95 (07/10/21 1350)  Temperature: 97 9 °F (36 6 °C) (07/10/21 1350)  Temp Source: Axillary (07/10/21 1350)  Respirations: 16 (07/10/21 1350)  Weight - Scale: 110 kg (243 lb) (07/10/21 0124)  SpO2: 92 % (07/10/21 1350)      Discharge instructions/Information to patient and family:   See after visit summary for information provided to patient and family  Provisions for Follow-Up Care:  See after visit summary for information related to follow-up care and any pertinent home health orders  Disposition:     Home      Planned Readmission: *No     Discharge Statement:  I spent 25 minutes discharging the patient  This time was spent on the day of discharge  I had direct contact with the patient on the day of discharge  Greater than 50% of the total time was spent examining patient, answering all patient questions, arranging and discussing plan of care with patient as well as directly providing post-discharge instructions  Additional time then spent on discharge activities  Discharge Medications:  See after visit summary for reconciled discharge medications provided to patient and family        ** Please Note: This note has been constructed using a voice recognition system **

## 2021-07-10 NOTE — UTILIZATION REVIEW
Initial Clinical Review    Admission: Date/Time/Statement:   Admission Orders (From admission, onward)     Ordered        07/10/21 0652  Place in Observation  Once                   Orders Placed This Encounter   Procedures    Place in Observation     Standing Status:   Standing     Number of Occurrences:   1     Order Specific Question:   Level of Care     Answer:   Med Surg [16]     ED Arrival Information     Expected Arrival Acuity    - 7/10/2021 00:47 Urgent         Means of arrival Escorted by Service Admission type    Walk-In Self General Medicine Urgent         Arrival complaint    Flank Pain         Chief Complaint   Patient presents with    Flank Pain     left flank pain, stent removed yesterday am, stone removed wednesday, 10/10 pain       Initial Presentation:   47 y o  female with past medical history of obesity, sleep apnea, migraines, gastroparesis, pseudotumor cerebri, kidney who presents with left flank pain for the past 1 day  Patient has history of kidney stones and was in the emergency room about 2 weeks ago at which time patient had cystoscopy with stent placement  Later patient underwent ureteroscopy with laser with lithotripsy on July 6, 2021  Later her left ureteral stent was removed yesterday following which patient has been having significant left flank pain sometimes radiating into the front  Patient complains of nausea but denies any vomiting  Patient denies any burning urination frequency or urgency  In the ED patient's vital signs are stable  Labs showed mild elevation of white count  UA showed large amount of blood and is negative for nitrites and had trace leukocyte esterase with 4-10 white cells    CT scan of the abdomen showed moderately obstructing 3 millimeter proximal left ureteral calculus below the ureteropelvic junction and mild left-sided hydroureter without any distal ureteral calculus and 5 millimeter nonobstructing calyceal calculus in the lower pole of the left kidney  Renal colic on left side  Assessment & Plan  Patient presented with left flank pain  Patient had recent hospitalization about a couple of weeks ago with left-sided nephrolithiasis status post stent placement which was removed yesterday  Continue NPO  IV hydration  Continue Flomax  Patient might need repeat cystoscopy  CT scan of the abdomen showed moderately obstructing 3 millimeter proximal left ureteral calculus just below the ureteropelvic junction with mild left-sided hydroureter up to urinary bladder without any distal ureteral calculi and 5 millimeter nonobstructing calyceal callus +in the left lower pole of the kidney        Pseudotumor  Assessment & Plan  Outpatient follow-up with Neurology and Ophthalmology     Gastroparesis  Assessment & Plan  Continue Reglan p r n  When diet is restarted  Lansoprazole substituted with Protonix    UROLOGY CONSULT   CT scan shows a 3 mm nonobstructing stone in the proximal left ureter with some fragments in the lower pole the left kidney totaling 5 mm  She has moderate hydronephrosis and persisting pain  She is hopeful to have repeat ureteroscopy and possible lithotripsy with removal of residual stones today  Obviously this depends on the narrowing that is present and always was present in the mid left ureter  Case request for left ureteroscopy with laser lithotripsy if possible  If not possible to access the proximal ureter and lower pole of left kidney she would just need a temporizing stent with delayed ureteroscopy  She is aware that this is a distinct possibility given the narrowing that was present from day one of the left ureter  In the meantime we will supplement her with Toradol and morphine as needed    Date:    Day 2:     ED Triage Vitals   Temperature Pulse Respirations Blood Pressure SpO2   07/10/21 0124 07/10/21 0124 07/10/21 0124 07/10/21 0124 07/10/21 0124   97 5 °F (36 4 °C) 90 16 140/75 95 %      Temp Source Heart Rate Source Patient Position - Orthostatic VS BP Location FiO2 (%)   07/10/21 0124 07/10/21 0124 07/10/21 0715 07/10/21 0124 --   Tympanic Monitor Lying Right arm       Pain Score       07/10/21 0229       3          Wt Readings from Last 1 Encounters:   07/10/21 110 kg (243 lb)     Additional Vital Signs:   Pertinent Labs/Diagnostic Test Results:       Results from last 7 days   Lab Units 07/10/21  0205 07/09/21  1319   WBC Thousand/uL 11 44* 11 45*   HEMOGLOBIN g/dL 12 2 13 0   HEMATOCRIT % 39 2 40 9   PLATELETS Thousands/uL 311 340   NEUTROS ABS Thousands/µL 6 44 7 47         Results from last 7 days   Lab Units 07/10/21  0205 07/09/21  1319   SODIUM mmol/L 141 143   POTASSIUM mmol/L 4 0 4 0   CHLORIDE mmol/L 105 106   CO2 mmol/L 27 28   ANION GAP mmol/L 9 9   BUN mg/dL 13 11   CREATININE mg/dL 0 88 0 97   EGFR ml/min/1 73sq m 84 74   CALCIUM mg/dL 8 6 9 0   MAGNESIUM mg/dL 2 1  --      Results from last 7 days   Lab Units 07/10/21  0205 07/09/21  1319   AST U/L 19 24   ALT U/L 34 34   ALK PHOS U/L 66 70   TOTAL PROTEIN g/dL 6 8 7 2   ALBUMIN g/dL 3 2* 3 4*   TOTAL BILIRUBIN mg/dL 0 24 0 24         Results from last 7 days   Lab Units 07/10/21  0205 07/09/21  1319   GLUCOSE RANDOM mg/dL 111 97             No results found for: BETA-HYDROXYBUTYRATE                                                                           Results from last 7 days   Lab Units 07/10/21  0215 07/09/21  1401   CLARITY UA  Clear Slightly Cloudy   COLOR UA  Yellow Yellow   SPEC GRAV UA  1 020 1 020   PH UA  6 0 6 5   GLUCOSE UA mg/dl Negative Negative   KETONES UA mg/dl Negative Negative   BLOOD UA  Large* Large*   PROTEIN UA mg/dl Negative 100 (2+)*   NITRITE UA  Negative Negative   BILIRUBIN UA  Negative Negative   UROBILINOGEN UA E U /dl 0 2 0 2   LEUKOCYTES UA  Trace* Negative   WBC UA /hpf 4-10* 4-10*   RBC UA /hpf 10-20* Innumerable*   BACTERIA UA /hpf Occasional None Seen   EPITHELIAL CELLS WET PREP /hpf Occasional Occasional ED Treatment:   Medication Administration from 07/10/2021 0047 to 07/10/2021 0825       Date/Time Order Dose Route Action Action by Comments     07/10/2021 0712 sodium chloride 0 9 % bolus 1,000 mL 0 mL Intravenous Stopped Desirae Funez RN      07/10/2021 7852 sodium chloride 0 9 % bolus 1,000 mL 1,000 mL Intravenous Vedasultanadanielleedith [address] Hendricks Dakin, RN      07/10/2021 4849 ketorolac (TORADOL) injection 15 mg 15 mg Intravenous Given Hendricks Dakin, RN      07/10/2021 0229 ondansetron (ZOFRAN) injection 4 mg 4 mg Intravenous Given Hendricks Dakin, RN      07/10/2021 0710 HYDROmorphone (DILAUDID) injection 1 mg 1 mg Intravenous Given Desirae Funez RN         Past Medical History:   Diagnosis Date    Gastroparesis     Migraine     Pseudotumor cerebri     Renal disorder     kidney stones     Present on Admission:   Gastroparesis   Pseudotumor      Admitting Diagnosis: Hydroureter [N13 4]  Nephrolithiasis [N20 0]  Flank pain [R10 9]  Left flank pain [R10 9]  Age/Sex: 47 y o  female  Admission Orders:  Scheduled Medications:  cefazolin, 2,000 mg, Intravenous, Once  enoxaparin, 40 mg, Subcutaneous, Daily  fluticasone, 1 spray, Each Nare, Daily  loratadine, 10 mg, Oral, Daily  pantoprazole, 40 mg, Oral, Early Morning  tamsulosin, 0 4 mg, Oral, Daily With Dinner      Continuous IV Infusions:  sodium chloride, 125 mL/hr, Intravenous, Continuous      PRN Meds:  ALPRAZolam, 0 25 mg, Oral, HS PRN  ketorolac, 15 mg, Intravenous, Q6H PRN  ketorolac, 30 mg, Intravenous, Q6H PRN  morphine injection, 4 mg, Intravenous, Q2H PRN  morphine injection, 4 mg, Intravenous, Q4H PRN  ondansetron, 4 mg, Intravenous, Q6H PRN        IP CONSULT TO UROLOGY    Network Utilization Review Department  ATTENTION: Please call with any questions or concerns to [phone] and carefully listen to the prompts so that you are directed to the right person   All voicemails are confidential   [facility] all requests for admission clinical reviews, approved or denied determinations and any other requests to dedicated fax number below belonging to the campus where the patient is receiving treatment   List of dedicated fax numbers for the Facilities:  [address] [place] [address] DENIALS (Administrative/Medical Necessity) [phone]   [address] (Maternity/NICU/Pediatrics) [phone] [address] [place] [address] [address] [place] [address] [address] [place] [address] [address] [facility] [phone]

## 2021-07-10 NOTE — ANESTHESIA PREPROCEDURE EVALUATION
Procedure:  CYSTOSCOPY URETEROSCOPY WITH LITHOTRIPSY HOLMIUM LASER, RETROGRADE PYELOGRAM AND INSERTION STENT URETERAL (Left Bladder)    Relevant Problems   No relevant active problems        Physical Exam    Airway    Mallampati score: II  TM Distance: >3 FB  Neck ROM: full     Dental   No notable dental hx     Cardiovascular  Cardiovascular exam normal    Pulmonary  Pulmonary exam normal     Other Findings        Anesthesia Plan  ASA Score- 2 Emergent    Anesthesia Type- general with ASA Monitors  Additional Monitors:   Airway Plan: LMA  Plan Factors-Exercise tolerance (METS): >4 METS  Chart reviewed  Imaging results reviewed  Existing labs reviewed  Patient summary reviewed  Patient is not a current smoker  Obstructive sleep apnea risk education given perioperatively  Induction- intravenous  Postoperative Plan- Plan for postoperative opioid use  Informed Consent- Anesthetic plan and risks discussed with patient  I personally reviewed this patient with the CRNA  Discussed and agreed on the Anesthesia Plan with the CRNA  Teena Mcdonough

## 2021-07-10 NOTE — H&P
Susy 47  H&P- Pratima Rubio 1980, 47 y o  female MRN: [id number]  Unit/Bed#: [address] [place] [address] Encounter: [id number]  Primary Care Provider: Queen Abhinav DO   Date and time admitted to hospital: 7/10/2021  1:07 AM    * Renal colic on left side  Assessment & Plan  Patient presented with left flank pain  Patient had recent hospitalization about a couple of weeks ago with left-sided nephrolithiasis status post stent placement which was removed yesterday  Continue NPO  IV hydration  Continue Flomax  Patient might need repeat cystoscopy  CT scan of the abdomen showed moderately obstructing 3 millimeter proximal left ureteral calculus just below the ureteropelvic junction with mild left-sided hydroureter up to urinary bladder without any distal ureteral calculi and 5 millimeter nonobstructing calyceal callus +in the left lower pole of the kidney      Pseudotumor  Assessment & Plan  Outpatient follow-up with Neurology and Ophthalmology    Gastroparesis  Assessment & Plan  Continue Reglan p r n  When diet is restarted  Lansoprazole substituted with Protonix      VTE Prophylaxis: Enoxaparin (Lovenox)  / sequential compression device   Code Status:  Full code      Anticipated Length of Stay:  Patient will be admitted on an Observation basis with an anticipated length of stay of  less than 2 midnights  Justification for Hospital Stay:  Left flank pain    Total Time for Visit, including Counseling / Coordination of Care: 60 minutes  Greater than 50% of this total time spent on direct patient counseling and coordination of care  Chief Complaint:   Left flank pain    History of Present Illness:    Pratima Rubio is a 47 y o  female with past medical history of obesity, sleep apnea, migraines, gastroparesis, pseudotumor cerebri, kidney who presents with left flank pain for the past 1 day    Patient has history of kidney stones and was in the emergency room about 2 weeks ago at which time patient had cystoscopy with stent placement  Later patient underwent ureteroscopy with laser with lithotripsy on July 6, 2021  Later her left ureteral stent was removed yesterday following which patient has been having significant left flank pain sometimes radiating into the front  Patient complains of nausea but denies any vomiting  Patient denies any burning urination frequency or urgency  In the ED patient's vital signs are stable  Labs showed mild elevation of white count  UA showed large amount of blood and is negative for nitrites and had trace leukocyte esterase with 4-10 white cells  CT scan of the abdomen showed moderately obstructing 3 millimeter proximal left ureteral calculus below the ureteropelvic junction and mild left-sided hydroureter without any distal ureteral calculus and 5 millimeter nonobstructing calyceal calculus in the lower pole of the left kidney    Review of Systems:    Review of Systems   Constitutional: Negative for activity change, appetite change, chills, diaphoresis, fatigue, fever and unexpected weight change  HENT: Negative for congestion, ear discharge, ear pain, facial swelling, hearing loss, mouth sores, nosebleeds, postnasal drip, rhinorrhea, sinus pressure, sneezing, sore throat, tinnitus, trouble swallowing and voice change  Eyes: Negative for photophobia, discharge, redness and visual disturbance  Respiratory: Negative for cough, chest tightness, shortness of breath, wheezing and stridor  Cardiovascular: Negative for chest pain, palpitations and leg swelling  Gastrointestinal: Positive for nausea  Negative for abdominal distention, abdominal pain, anal bleeding, blood in stool, constipation, diarrhea and vomiting  Endocrine: Negative for polydipsia, polyphagia and polyuria  Genitourinary: Positive for flank pain   Negative for decreased urine volume, difficulty urinating, dysuria, hematuria, menstrual problem, pelvic pain, urgency, vaginal bleeding and vaginal discharge  Musculoskeletal: Negative for arthralgias, back pain and neck stiffness  Skin: Negative for pallor and rash  Neurological: Negative for dizziness, seizures, facial asymmetry, speech difficulty, light-headedness, numbness and headaches  Hematological: Negative for adenopathy  Psychiatric/Behavioral: Negative for agitation and confusion  Past Medical and Surgical History:     Past Medical History:   Diagnosis Date    Gastroparesis     Migraine     Pseudotumor cerebri     Renal disorder     kidney stones       Past Surgical History:   Procedure Laterality Date    CARPAL TUNNEL RELEASE Right     FL RETROGRADE PYELOGRAM  6/26/2021    OH CYSTO/URETERO W/LITHOTRIPSY &INDWELL STENT INSRT Left 6/26/2021    Procedure: CYSTOSCOPY URETEROSCOPY WITH , RETROGRADE PYELOGRAM AND INSERTION STENT URETERAL;  Surgeon: Mayela Espinal MD;  Location: [facility];  Service: Urology       Meds/Allergies:    Prior to Admission medications    Medication Sig Start Date End Date Taking?  Authorizing Provider   ALPRAZolam [place]) 0 25 mg tablet Take 1 tablet (0 25 mg total) by mouth daily at bedtime as needed (second line bladder spasm) 6/27/21  Yes Lul Marquez,    cefadroxil (DURICEF) 500 mg capsule Take 500 mg by mouth every 12 (twelve) hours   Yes Historical Provider, MD   desloratadine (CLARINEX REDITAB) 5 MG disintegrating tablet Take 10 mg by mouth daily    Yes Historical Provider, MD   fluticasone (FLONASE SENSIMIST) 27 5 MCG/SPRAY nasal spray 2 sprays into each nostril daily    Yes Historical Provider, MD   lansoprazole (PREVACID SOLUTAB) 30 mg disintegrating tablet Take 30 mg by mouth 2 (two) times a day  11/9/18  Yes Historical Provider, MD   metoclopramide (REGLAN) 5 mg tablet Take 5 mg by mouth 3 (three) times a day as needed  11/25/18  Yes Historical Provider, MD   oxyCODONE-acetaminophen (PERCOCET) 5-325 mg per tablet Take 1 tablet by mouth every 8 (eight) hours as needed for moderate painMax Daily Amount: 3 tablets 6/27/21  Yes Lul Marquez DO   tamsulosin (FLOMAX) 0 4 mg Take 1 capsule (0 4 mg total) by mouth daily with dinner 6/27/21  Yes Rina Titus DO   Cholecalciferol 2000 units CAPS Take 2 capsules by mouth daily   Patient not taking: Reported on 7/10/2021    Historical Provider, MD   phenazopyridine (PYRIDIUM) 100 mg tablet Take 1 tablet (100 mg total) by mouth 3 (three) times a day as needed for bladder spasms 6/27/21 7/10/21  Rina Alto, DO   topiramate (TOPAMAX) 100 mg tablet Take 100 mg by mouth daily   7/10/21  Historical Provider, MD     I have reviewed home medications using allscripts  Allergies: Allergies   Allergen Reactions    Coconut Oil - Food Allergy     Fentanyl Vomiting    Prednisone Rash    Wound Dressing Adhesive Rash     Adhesive tape per pt  Social History:     Marital Status: /Civil Union     Substance Use History:   Social History     Substance and Sexual Activity   Alcohol Use Never     Social History     Tobacco Use   Smoking Status Never Smoker   Smokeless Tobacco Never Used     Social History     Substance and Sexual Activity   Drug Use Never       Family History:    History reviewed  No pertinent family history  Physical Exam:     Vitals:   Blood Pressure: 120/70 (07/10/21 0715)  Pulse: 88 (07/10/21 0715)  Temperature: 98 2 °F (36 8 °C) (07/10/21 0715)  Temp Source: Tympanic (07/10/21 0715)  Respirations: 18 (07/10/21 0715)  Weight - Scale: 110 kg (243 lb) (07/10/21 0124)  SpO2: 95 % (07/10/21 0715)    Physical Exam  Constitutional:       Appearance: Normal appearance  HENT:      Head: Normocephalic and atraumatic  Nose: Nose normal       Mouth/Throat:      Mouth: Mucous membranes are moist       Pharynx: Oropharynx is clear  Eyes:      Extraocular Movements: Extraocular movements intact  Pupils: Pupils are equal, round, and reactive to light  Cardiovascular:      Rate and Rhythm: Normal rate and regular rhythm  Pulmonary:      Effort: Pulmonary effort is normal       Breath sounds: Normal breath sounds  Abdominal:      General: Bowel sounds are normal  There is no distension  Palpations: Abdomen is soft  Tenderness: There is no abdominal tenderness  Musculoskeletal:         General: No swelling  Cervical back: Normal range of motion and neck supple  Skin:     General: Skin is warm and dry  Neurological:      General: No focal deficit present  Mental Status: She is alert  Additional Data:     Lab Results: I have personally reviewed pertinent reports  Results from last 7 days   Lab Units 07/10/21  0205   WBC Thousand/uL 11 44*   HEMOGLOBIN g/dL 12 2   HEMATOCRIT % 39 2   PLATELETS Thousands/uL 311   NEUTROS PCT % 56   LYMPHS PCT % 32   MONOS PCT % 8   EOS PCT % 3     Results from last 7 days   Lab Units 07/10/21  0205   SODIUM mmol/L 141   POTASSIUM mmol/L 4 0   CHLORIDE mmol/L 105   CO2 mmol/L 27   BUN mg/dL 13   CREATININE mg/dL 0 88   ANION GAP mmol/L 9   CALCIUM mg/dL 8 6   ALBUMIN g/dL 3 2*   TOTAL BILIRUBIN mg/dL 0 24   ALK PHOS U/L 66   ALT U/L 34   AST U/L 19   GLUCOSE RANDOM mg/dL 111                       Imaging: I have personally reviewed pertinent films in PACS    CT renal stone study abdomen pelvis wo contrast   Final Result by Rommel Yates DO (07/10 4541)   1  Moderately obstructing 3 mm proximal left ureteric calculus just below the ureteropelvic junction  2   Mild left-sided hydroureter, up to the level of the urinary bladder  No distal ureteric calculi are seen  Findings may be related to spasm or blood clot at the ureterovesical junction  3   5 mm nonobstructing calyceal calculus lower pole left kidney  Recommend follow-up urology consultation  The study was marked in [facility] for immediate notification        Workstation performed: [id number]             EKG, Pathology, and Other Studies Reviewed on Admission:   · EKG: Allscripts / [facility] Records Reviewed: Yes     ** Please Note: This note has been constructed using a voice recognition system   **

## 2021-07-10 NOTE — OP NOTE
OPERATIVE REPORT- Dr Britni Carlson  PATIENT NAME: Lilian Diaz    :  1980  MRN: [id number]  Pt Location: [facility] ROOM 04    SURGERY DATE: 7/10/2021    Surgeon: Valeria Fairchild MD    Pre-op Diagnosis:  1  Left renal stone, left ureteral stone with hydro and pain after stent removed yesterday  Post-op Diagnosis:  1  Same    Procedure:  1  Cystoscopy  2  Fluoroscopy  3  Left retrograde pyelography  4  Left ureteroscopy  5  Stone basketing  6  Left ureteral stent placement    Specimen(s):  ID Type Source Tests Collected by Time Destination   1 :  Calculus Kidney, Left STONE ANALYSIS, TISSUE EXAM Valeria Fairchild MD 7/10/2021 1246        Estimated Blood Loss:   Minimal    Complications:    None    Drains:  7x24 cm left ureteral stent    Anesthesia type:  General    Indications for surgery:  Please see the dictated history and physical     Findings:  Left renal stone  Procedure and Technique:   After obtaining consent and identifying the patient, antibiotics were given as ordered and the patient was brought to the room  All appropriate leads and monitors were placed and the patient was appropriately positioned on the table  Anesthesia was administered and the patient was sterilely prepped and draped  A timeout was performed where the patient name, , procedure, antibiotics, allergies, etc  were discussed  All in the room were satisfied before the start of the operative procedure  What follows are the operative findings and events  Cystoscopy was performed   imaging was obtained  The stone was located in the lower pole and was neg on x-ray  A retrograde pyelogram was performed  A guidewire was passed up the ureter to the kidney  A second wire was passed without difficulty  The safety wire was secured to the drape and then a 28 cm long ureteral access sheath was gently passed over the second wire using fluoroscopic guidance    The flexible ureteroscope was passed through the sheath up to the stone  The stones were identified and a picture was taken  The stones were extracted with a stone basket  A retrograde was performed demonstrating  The scope was withdrawn down the ureter evaluating for any trauma  There were no stone fragments or trauma noted  The safety wire was backloaded through the cystoscope and the stent was placed over the wire  The guidewire was removed and a good coil was noted proximally in the kidney and distally in the bladder  The bladder was drained and the patient was awakened and  transferred to the PACU in satisfactory condition  Plan:  Stent removal in 10-14 days  Can go home today with 6 doses of cefadroxil 500mg  Half over the next 24 hours the rest starting when the stent comes out  SIGNATURE: Inge Mcwilliams MD  DATE: July 10, 2021  TIME: 1:19 PM    Portions of the record may have been created with voice recognition software   Occasional wrong word or "sound alike" substitutions may have occurred due to the inherent limitations of voice recognition software   Read the chart carefully and recognize, using context, where substitutions have occurred

## 2021-07-10 NOTE — CONSULTS
Consultation - [facility]  Savi Bautista 47 y o  female MRN: [id number]  Unit/Bed#: Deysi Broderick 314-01 Encounter: [id number]    Requesting Physician: Bro Patterson MD    Assessment/Plan:    Left flank pain  Patient presented with 6 mm stone 6/25/2021 and underwent stent placement  She subsequently had ureteroscopy with laser lithotripsy 7/6/21  Again challenging due to ureteral narrowing  Appeared stone free  Wanted stent removed so had that removed yesterday uneventfully but immediately presented with flank pain to the emergency department  She had that temporized with narcotic and was discharged only to re-presented later in the evening with worsening pain  CT scan shows a 3 mm nonobstructing stone in the proximal left ureter with some fragments in the lower pole the left kidney totaling 5 mm  She has moderate hydronephrosis and persisting pain  She is hopeful to have repeat ureteroscopy and possible lithotripsy with removal of residual stones today  Obviously this depends on the narrowing that is present and always was present in the mid left ureter  · Case request for left ureteroscopy with laser lithotripsy if possible  If not possible to access the proximal ureter and lower pole of left kidney she would just need a temporizing stent with delayed ureteroscopy  She is aware that this is a distinct possibility given the narrowing that was present from day one of the left ureter  In the meantime we will supplement her with Toradol and morphine as needed  HISTORY OF PRESENT ILLNESS:    Savi Bautista is a 47y o  year old female who we are asked to see for left proximal ureteral calculus with flank pain  PAST UROLOGIC HISTORY:  6/26/2021 left ureteral stent placement with failed left ureteroscopy  7/7/2021 left ureteroscopy with laser lithotripsy [address]    Complicated by again narrowing of the left mid ureter not allowing for passage of a ureteral access sheath proximally  PAST MEDICAL HISTORY:  Past Medical History:   Diagnosis Date    Gastroparesis     Migraine     Pseudotumor cerebri     Renal disorder     kidney stones       PAST SURGICAL HISTORY:  Past Surgical History:   Procedure Laterality Date    CARPAL TUNNEL RELEASE Right     FL RETROGRADE PYELOGRAM  6/26/2021    [place] CYSTO/URETERO W/LITHOTRIPSY &INDWELL STENT INSRT Left 6/26/2021    Procedure: CYSTOSCOPY URETEROSCOPY WITH , RETROGRADE PYELOGRAM AND INSERTION STENT URETERAL;  Surgeon: Angela Browning MD;  Location: [address] [place] [address];  Service: Urology       ALLERGIES:  Allergies   Allergen Reactions    Coconut Oil - Food Allergy     Fentanyl Vomiting    Prednisone Rash    Wound Dressing Adhesive Rash     Adhesive tape per pt  SOCIAL HISTORY:  Social History     Substance and Sexual Activity   Alcohol Use Never     Social History     Substance and Sexual Activity   Drug Use Never     Social History     Tobacco Use   Smoking Status Never Smoker   Smokeless Tobacco Never Used       FAMILY HISTORY:  History reviewed  No pertinent family history      MEDICATIONS:    Current Facility-Administered Medications:     ALPRAZolam (XANAX) tablet 0 25 mg, 0 25 mg, Oral, HS PRN, Ger Martinez MD    enoxaparin (LOVENOX) subcutaneous injection 40 mg, 40 mg, Subcutaneous, Daily, Falguni Dimas MD    fluticasone (FLONASE) 50 mcg/act nasal spray 1 spray, 1 spray, Each Nare, Daily, Falguni Dimas MD    loratadine (CLARITIN) tablet 10 mg, 10 mg, Oral, Daily, Falguni Dimas MD    morphine (PF) 4 mg/mL injection 4 mg, 4 mg, Intravenous, Q4H PRN, Eliana Dimas MD    ondansetron (ZOFRAN) injection 4 mg, 4 mg, Intravenous, Q6H PRN, Eliana Dimas MD    pantoprazole (PROTONIX) EC tablet 40 mg, 40 mg, Oral, Early Morning, Falguni Dimas MD    sodium chloride 0 9 % infusion, 125 mL/hr, Intravenous, Continuous, Falguni Dimas MD    tamsulosin (FLOMAX) capsule 0 4 mg, 0 4 mg, Oral, Daily With Urban Bernheim, MD    REVIEW OF SYMPTOMS:    PHYSICAL EXAM:  Vitals:    07/10/21 0715   BP: 120/70   Pulse: 88   Resp: 18   Temp: 98 2 °F (36 8 °C)   SpO2: 95%     Body mass index is 49 08 kg/m²  Physical Exam    Intake/Output Summary (Last 24 hours) at 7/10/2021 0836  Last data filed at 7/10/2021 8820  Gross per 24 hour   Intake 1000 ml   Output --   Net 1000 ml       LAB RESULTS:  Lab Results   Component Value Date    WBC 11 44 (H) 07/10/2021    HGB 12 2 07/10/2021    HCT 39 2 07/10/2021    MCV 89 07/10/2021     07/10/2021     Lab Results   Component Value Date    SODIUM 141 07/10/2021    K 4 0 07/10/2021     07/10/2021    CO2 27 07/10/2021    BUN 13 07/10/2021    CREATININE 0 88 07/10/2021    GLUC 111 07/10/2021    CALCIUM 8 6 07/10/2021     Lab Results   Component Value Date    CALCIUM 8 6 07/10/2021       OTHER STUDIES:  CT today - lower pole left renal fragments totaling 5mm and 3mm non obstructing proximal ureteral stone with moderate left hydro down to the mid left ureter  Nancy Gutiérrez MD  07/10/21    Portions of the record may have been created with voice recognition software   Occasional wrong word or "sound alike" substitutions may have occurred due to the inherent limitations of voice recognition software   Read the chart carefully and recognize, using context, where substitutions have occurred

## 2021-07-10 NOTE — ASSESSMENT & PLAN NOTE
Patient presented with left flank pain  Patient had recent hospitalization about a couple of weeks ago with left-sided nephrolithiasis status post stent placement which was removed yesterday  Continue NPO  IV hydration  Continue Flomax  Patient might need repeat cystoscopy  CT scan of the abdomen showed moderately obstructing 3 millimeter proximal left ureteral calculus just below the ureteropelvic junction with mild left-sided hydroureter up to urinary bladder without any distal ureteral calculi and 5 millimeter nonobstructing calyceal callus +in the left lower pole of the kidney

## 2021-07-10 NOTE — ED PROVIDER NOTES
History  Chief Complaint   Patient presents with    Flank Pain     left flank pain, stent removed yesterday am, stone removed wednesday, 10/10 pain     28-year-old female with past history of obesity, gastroparesis, pseudotumor cerebri, migraine, presents to the ED for evaluation of left flank pain since yesterday afternoon  Patient was diagnosed with nephrolithiasis on the left side 2 weeks ago  Patient had a stent placed with stone removal   Patient's stent was removed yesterday by her urologist, Dr Jo Wheatley  Patient was doing well until this evening when her pain started again  Patient appears to be in moderate distress secondary to pain in the ED  Patient has had associated nausea but denies vomiting  Patient spoke with her urologist who instructed her to come to the ED for further evaluation  History provided by:  Patient  Flank Pain  Associated symptoms: no chest pain, no chills, no cough, no dysuria, no fever, no hematuria, no shortness of breath, no sore throat and no vomiting        Prior to Admission Medications   Prescriptions Last Dose Informant Patient Reported? Taking?    ALPRAZolam (XANAX) 0 25 mg tablet 2021 at Unknown time  No Yes   Sig: Take 1 tablet (0 25 mg total) by mouth daily at bedtime as needed (second line bladder spasm)   Cholecalciferol 2000 units CAPS Unknown at Unknown time Self Yes No   Sig: Take 2 capsules by mouth daily    Patient not taking: Reported on 7/10/2021   cefadroxil (DURICEF) 500 mg capsule 2021 at Unknown time  Yes Yes   Sig: Take 500 mg by mouth every 12 (twelve) hours   desloratadine (CLARINEX REDITAB) 5 MG disintegrating tablet 2021 at Unknown time Self Yes Yes   Sig: Take 10 mg by mouth daily    fluticasone (FLONASE SENSIMIST) 27 5 MCG/SPRAY nasal spray 2021 at Unknown time Self Yes Yes   Si sprays into each nostril daily    lansoprazole (PREVACID SOLUTAB) 30 mg disintegrating tablet 2021 at Unknown time Self Yes Yes   Sig: Take 30 mg by mouth 2 (two) times a day    metoclopramide (REGLAN) 5 mg tablet 7/9/2021 at Unknown time Self Yes Yes   Sig: Take 5 mg by mouth 3 (three) times a day as needed    oxyCODONE-acetaminophen (PERCOCET) 5-325 mg per tablet 7/9/2021 at Unknown time  No Yes   Sig: Take 1 tablet by mouth every 8 (eight) hours as needed for moderate painMax Daily Amount: 3 tablets   tamsulosin (FLOMAX) 0 4 mg 7/9/2021 at Unknown time  No Yes   Sig: Take 1 capsule (0 4 mg total) by mouth daily with dinner      Facility-Administered Medications: None       Past Medical History:   Diagnosis Date    Gastroparesis     Migraine     Pseudotumor cerebri     Renal disorder     kidney stones       Past Surgical History:   Procedure Laterality Date    CARPAL TUNNEL RELEASE Right     FL RETROGRADE PYELOGRAM  6/26/2021    [place] CYSTO/URETERO W/LITHOTRIPSY &INDWELL STENT INSRT Left 6/26/2021    Procedure: CYSTOSCOPY URETEROSCOPY WITH , RETROGRADE PYELOGRAM AND INSERTION STENT URETERAL;  Surgeon: Mariya Nazario MD;  Location: [address] [place] [address];  Service: Urology       History reviewed  No pertinent family history  I have reviewed and agree with the history as documented  E-Cigarette/Vaping    E-Cigarette Use Never User      E-Cigarette/Vaping Substances     Social History     Tobacco Use    Smoking status: Never Smoker    Smokeless tobacco: Never Used   Vaping Use    Vaping Use: Never used   Substance Use Topics    Alcohol use: Never    Drug use: Never       Review of Systems   Constitutional: Negative for chills and fever  HENT: Negative for ear pain and sore throat  Eyes: Negative for pain and visual disturbance  Respiratory: Negative for cough and shortness of breath  Cardiovascular: Negative for chest pain and palpitations  Gastrointestinal: Negative for abdominal pain and vomiting  Genitourinary: Positive for flank pain  Negative for dysuria and hematuria  Musculoskeletal: Negative for arthralgias and back pain  Skin: Negative for color change and rash  Neurological: Negative for seizures and syncope  All other systems reviewed and are negative  Physical Exam  Physical Exam  Vitals and nursing note reviewed  Constitutional:       Appearance: She is well-developed  HENT:      Head: Normocephalic and atraumatic  Eyes:      Conjunctiva/sclera: Conjunctivae normal    Cardiovascular:      Rate and Rhythm: Normal rate and regular rhythm  Heart sounds: Normal heart sounds  Pulmonary:      Effort: Pulmonary effort is normal       Breath sounds: Normal breath sounds  Abdominal:      General: Bowel sounds are normal  There is no distension  Palpations: Abdomen is soft  Tenderness: There is no abdominal tenderness  There is left CVA tenderness  Comments: Left-sided CVA tenderness surgical patient  Musculoskeletal:         General: Normal range of motion  Cervical back: Normal range of motion and neck supple  Skin:     General: Skin is warm and dry  Neurological:      Mental Status: She is alert and oriented to person, place, and time  Psychiatric:         Behavior: Behavior normal          Thought Content:  Thought content normal          Judgment: Judgment normal          Vital Signs  ED Triage Vitals   Temperature Pulse Respirations Blood Pressure SpO2   07/10/21 0124 07/10/21 0124 07/10/21 0124 07/10/21 0124 07/10/21 0124   97 5 °F (36 4 °C) 90 16 140/75 95 %      Temp Source Heart Rate Source Patient Position - Orthostatic VS BP Location FiO2 (%)   07/10/21 0124 07/10/21 0124 -- 07/10/21 0124 --   Tympanic Monitor  Right arm       Pain Score       07/10/21 0229       3           Vitals:    07/10/21 0124 07/10/21 0130 07/10/21 0638   BP: 140/75 140/75 138/71   Pulse: 90  89         Visual Acuity      ED Medications  Medications   HYDROmorphone (DILAUDID) injection 1 mg (has no administration in time range)   sodium chloride 0 9 % bolus 1,000 mL (1,000 mL Intravenous New Bag 7/10/21 0229)   ketorolac (TORADOL) injection 15 mg (15 mg Intravenous Given 7/10/21 0229)   ondansetron (ZOFRAN) injection 4 mg (4 mg Intravenous Given 7/10/21 0229)       Diagnostic Studies  Results Reviewed     Procedure Component Value Units Date/Time    Urine Microscopic [[id number]]  (Abnormal) Collected: 07/10/21 0215    Lab Status: Final result Specimen: Urine, Clean Catch Updated: 07/10/21 0236     RBC, UA 10-20 /hpf      WBC, UA 4-10 /hpf      Epithelial Cells Occasional /hpf      Bacteria, UA Occasional /hpf     UA w Reflex to Microscopic w Reflex to Culture [[id number]]  (Abnormal) Collected: 07/10/21 0215    Lab Status: Final result Specimen: Urine, Clean Catch Updated: 07/10/21 0228     Color, UA Yellow     Clarity, UA Clear     Specific [place], UA 1 020     pH, UA 6 0     Leukocytes, UA Trace     Nitrite, UA Negative     Protein, UA Negative mg/dl      Glucose, UA Negative mg/dl      Ketones, UA Negative mg/dl      Urobilinogen, UA 0 2 E U /dl      Bilirubin, UA Negative     Blood, UA Large    Comprehensive metabolic panel [[id number]]  (Abnormal) Collected: 07/10/21 0205    Lab Status: Final result Specimen: Blood from Arm, Left Updated: 07/10/21 0227     Sodium 141 mmol/L      Potassium 4 0 mmol/L      Chloride 105 mmol/L      CO2 27 mmol/L      ANION GAP 9 mmol/L      BUN 13 mg/dL      Creatinine 0 88 mg/dL      Glucose 111 mg/dL      Calcium 8 6 mg/dL      Corrected Calcium 9 2 mg/dL      AST 19 U/L      ALT 34 U/L      Alkaline Phosphatase 66 U/L      Total Protein 6 8 g/dL      Albumin 3 2 g/dL      Total Bilirubin 0 24 mg/dL      eGFR 84 ml/min/1 73sq m     Narrative:      [place] guidelines for Chronic Kidney Disease (CKD):     Stage 1 with normal or high GFR (GFR > 90 mL/min/1 73 square meters)    Stage 2 Mild CKD (GFR = 60-89 mL/min/1 73 square meters)    Stage 3A Moderate CKD (GFR = 45-59 mL/min/1 73 square meters)    Stage 3B Moderate CKD (GFR = 30-44 mL/min/1 73 square meters)    Stage 4 Severe CKD (GFR = 15-29 mL/min/1 73 square meters)    Stage 5 End Stage CKD (GFR <15 mL/min/1 73 square meters)  Note: GFR calculation is accurate only with a steady state creatinine    Magnesium [[id number]]  (Normal) Collected: 07/10/21 0205    Lab Status: Final result Specimen: Blood from Arm, Left Updated: 07/10/21 0227     Magnesium 2 1 mg/dL     POCT pregnancy, urine [[id number]]  (Normal) Resulted: 07/10/21 0225    Lab Status: Final result Updated: 07/10/21 0225     EXT PREG TEST UR (Ref: Negative) negative     Control Valid    CBC and differential [[id number]]  (Abnormal) Collected: 07/10/21 0205    Lab Status: Final result Specimen: Blood from Arm, Left Updated: 07/10/21 0211     WBC 11 44 Thousand/uL      RBC 4 40 Million/uL      Hemoglobin 12 2 g/dL      Hematocrit 39 2 %      MCV 89 fL      MCH 27 7 pg      MCHC 31 1 g/dL      RDW 13 3 %      MPV 9 4 fL      Platelets 550 Thousands/uL      nRBC 0 /100 WBCs      Neutrophils Relative 56 %      Immat GRANS % 0 %      Lymphocytes Relative 32 %      Monocytes Relative 8 %      Eosinophils Relative 3 %      Basophils Relative 1 %      Neutrophils Absolute 6 44 Thousands/µL      Immature Grans Absolute 0 05 Thousand/uL      Lymphocytes Absolute 3 63 Thousands/µL      Monocytes Absolute 0 95 Thousand/µL      Eosinophils Absolute 0 30 Thousand/µL      Basophils Absolute 0 07 Thousands/µL                  CT renal stone study abdomen pelvis wo contrast   Final Result by Arley Page DO (07/10 7684)   1  Moderately obstructing 3 mm proximal left ureteric calculus just below the ureteropelvic junction  2   Mild left-sided hydroureter, up to the level of the urinary bladder  No distal ureteric calculi are seen  Findings may be related to spasm or blood clot at the ureterovesical junction  3   5 mm nonobstructing calyceal calculus lower pole left kidney  Recommend follow-up urology consultation  The study was marked in [facility] for immediate notification  Workstation performed: [id number]                    Procedures  Procedures         ED Course  ED Course as of Jul 10 0701   Sat Jul 10, 2021   0500 Case discussed with patient's neurologist, Dr Yasmin Jasso who knows that if patient has residual stone in right ureter with pain then patient can be admitted to medicine for further evaluation with Urology consult  MDM  Number of Diagnoses or Management Options  Hydroureter: new and requires workup  Left flank pain: new and requires workup  Nephrolithiasis: new and requires workup  Diagnosis management comments: Obtain blood work, UA, CT renal stone protocol  Give IV fluids, antiemetics and pain medication and reassess       Amount and/or Complexity of Data Reviewed  Clinical lab tests: ordered and reviewed  Tests in the radiology section of CPT®: ordered and reviewed  Tests in the medicine section of CPT®: ordered and reviewed  Review and summarize past medical records: yes  Independent visualization of images, tracings, or specimens: yes    Risk of Complications, Morbidity, and/or Mortality  General comments: UA showed large blood  Subsequent CT abdomen/pelvis showed moderately obstructing 3 mm proximal left ureteric calculus just below the ureteropelvic junction  Patient required multiple doses of pain medication in the ED for pain control  At this time patient is admitted to Medicine with Urology consult for further evaluation and management  Patient agrees with admission plans      Patient Progress  Patient progress: stable      Disposition  Final diagnoses:   Nephrolithiasis   Hydroureter   Left flank pain     Time reflects when diagnosis was documented in both MDM as applicable and the Disposition within this note     Time User Action Codes Description Comment    7/10/2021  6:48 AM Geraldine Nevarez Add [N20 0] Nephrolithiasis     7/10/2021  6:48 AM Sharan Mota Add [N13 4] Hydroureter     7/10/2021  6:48 AM Poppy Balloon, Addie Fulling Add [R10 9] Left flank pain       ED Disposition     ED Disposition Condition Date/Time Comment    Admit Stable Sat Jul 10, 2021  6:52 AM Case was discussed with PA for hospitalist and the patient's admission status was agreed to be Admission Status: observation status to the service of Dr Paiz Clear  Follow-up Information    None         Patient's Medications   Discharge Prescriptions    No medications on file     No discharge procedures on file      PDMP Review     None          ED Provider  Electronically Signed by           Antoni Henao DO  07/10/21 5891

## 2021-07-10 NOTE — ASSESSMENT & PLAN NOTE
Patient presented with left flank pain  Patient had recent hospitalization about a couple of weeks ago with left-sided nephrolithiasis status post stent placement which was removed yesterday  Patient was treated with IV fluids and NPO status  Patient underwent cystoscopy stone basketing and left ureteral stent placement  Patient to be discharged on cefadroxil and Percocet  CT scan of the abdomen showed moderately obstructing 3 millimeter proximal left ureteral calculus just below the ureteropelvic junction with mild left-sided hydroureter up to urinary bladder without any distal ureteral calculi and 5 millimeter nonobstructing calyceal callus +in the left lower pole of the kidney

## 2021-07-10 NOTE — NURSING NOTE
Iv access removed, belongings gathered by patient, discharge instructions provided to patient, patient stated understanding, prescriptions sent to pharmacy, left floor via walking, discharged to home

## 2021-07-25 LAB
CALCIUM OXALATE DIHYDRATE MFR STONE IR: 30 %
COLOR STONE: NORMAL
COM MFR STONE: 50 %
COMMENT-STONE3: NORMAL
COMPOSITION: NORMAL
HYDROXYAPATITE 24H ENGDIFF UR: 20 %
LABORATORY COMMENT REPORT: NORMAL
PHOTO: NORMAL
SIZE STONE: NORMAL MM
SPEC SOURCE SUBJ: NORMAL
STONE ANALYSIS-IMP: NORMAL
WT STONE: 38 MG

## 2021-11-17 ENCOUNTER — PREP FOR PROCEDURE (OUTPATIENT)
Dept: OBGYN CLINIC | Facility: OTHER | Age: 41
End: 2021-11-17

## 2021-11-17 DIAGNOSIS — M25.461 SWELLING OF RIGHT KNEE JOINT: Primary | ICD-10-CM

## 2021-12-23 ENCOUNTER — OFFICE VISIT (OUTPATIENT)
Dept: URGENT CARE | Facility: CLINIC | Age: 41
End: 2021-12-23
Payer: COMMERCIAL

## 2021-12-23 VITALS
WEIGHT: 246 LBS | HEIGHT: 59 IN | HEART RATE: 112 BPM | RESPIRATION RATE: 16 BRPM | BODY MASS INDEX: 49.59 KG/M2 | OXYGEN SATURATION: 97 % | TEMPERATURE: 97 F

## 2021-12-23 DIAGNOSIS — Z20.822 ENCOUNTER FOR SCREENING LABORATORY TESTING FOR COVID-19 VIRUS: ICD-10-CM

## 2021-12-23 DIAGNOSIS — J20.9 ACUTE BRONCHITIS, UNSPECIFIED ORGANISM: Primary | ICD-10-CM

## 2021-12-23 PROCEDURE — 87636 SARSCOV2 & INF A&B AMP PRB: CPT | Performed by: NURSE PRACTITIONER

## 2021-12-23 PROCEDURE — 99213 OFFICE O/P EST LOW 20 MIN: CPT | Performed by: NURSE PRACTITIONER

## 2021-12-23 RX ORDER — PANTOPRAZOLE SODIUM 40 MG/1
TABLET, DELAYED RELEASE ORAL
COMMUNITY
Start: 2021-12-20

## 2021-12-23 RX ORDER — DOXYCYCLINE 100 MG/1
100 TABLET ORAL 2 TIMES DAILY
Qty: 14 TABLET | Refills: 0 | Status: SHIPPED | OUTPATIENT
Start: 2021-12-23 | End: 2021-12-30

## 2021-12-23 RX ORDER — PANTOPRAZOLE SODIUM 40 MG/1
TABLET, DELAYED RELEASE ORAL
COMMUNITY
Start: 2021-09-21 | End: 2021-12-23

## 2021-12-23 RX ORDER — GUAIFENESIN AND CODEINE PHOSPHATE 100; 10 MG/5ML; MG/5ML
SOLUTION ORAL
Qty: 180 ML | Refills: 0 | Status: SHIPPED | OUTPATIENT
Start: 2021-12-23

## 2021-12-26 ENCOUNTER — HOSPITAL ENCOUNTER (EMERGENCY)
Facility: HOSPITAL | Age: 41
Discharge: HOME | End: 2021-12-26
Attending: EMERGENCY MEDICINE | Admitting: EMERGENCY MEDICINE
Payer: COMMERCIAL

## 2021-12-26 ENCOUNTER — APPOINTMENT (EMERGENCY)
Dept: CT IMAGING | Facility: HOSPITAL | Age: 41
End: 2021-12-26
Payer: COMMERCIAL

## 2021-12-26 VITALS
RESPIRATION RATE: 16 BRPM | SYSTOLIC BLOOD PRESSURE: 119 MMHG | OXYGEN SATURATION: 95 % | HEART RATE: 105 BPM | DIASTOLIC BLOOD PRESSURE: 75 MMHG | TEMPERATURE: 98 F

## 2021-12-26 DIAGNOSIS — J40 BRONCHITIS: Primary | ICD-10-CM

## 2021-12-26 DIAGNOSIS — K76.0 HEPATIC STEATOSIS: ICD-10-CM

## 2021-12-26 LAB
ALBUMIN SERPL BCP-MCNC: 3.2 G/DL (ref 3.5–5)
ALP SERPL-CCNC: 71 U/L (ref 46–116)
ALT SERPL W P-5'-P-CCNC: 30 U/L (ref 12–78)
ANION GAP SERPL CALCULATED.3IONS-SCNC: 8 MMOL/L (ref 4–13)
AST SERPL W P-5'-P-CCNC: 22 U/L (ref 5–45)
BASOPHILS # BLD AUTO: 0.06 THOUSANDS/ΜL (ref 0–0.1)
BASOPHILS NFR BLD AUTO: 1 % (ref 0–1)
BILIRUB SERPL-MCNC: 0.35 MG/DL (ref 0.2–1)
BUN SERPL-MCNC: 12 MG/DL (ref 5–25)
CALCIUM ALBUM COR SERPL-MCNC: 9.1 MG/DL (ref 8.3–10.1)
CALCIUM SERPL-MCNC: 8.5 MG/DL (ref 8.3–10.1)
CARDIAC TROPONIN I PNL SERPL HS: 2 NG/L
CHLORIDE SERPL-SCNC: 103 MMOL/L (ref 100–108)
CO2 SERPL-SCNC: 27 MMOL/L (ref 21–32)
CREAT SERPL-MCNC: 0.79 MG/DL (ref 0.6–1.3)
EOSINOPHIL # BLD AUTO: 0.34 THOUSAND/ΜL (ref 0–0.61)
EOSINOPHIL NFR BLD AUTO: 3 % (ref 0–6)
ERYTHROCYTE [DISTWIDTH] IN BLOOD BY AUTOMATED COUNT: 14.2 % (ref 11.6–15.1)
EXT PREG TEST URINE: NEGATIVE
EXT. CONTROL ED NAV: NORMAL
FLUAV RNA RESP QL NAA+PROBE: NEGATIVE
FLUAV RNA RESP QL NAA+PROBE: NEGATIVE
FLUBV RNA RESP QL NAA+PROBE: NEGATIVE
FLUBV RNA RESP QL NAA+PROBE: NEGATIVE
GFR SERPL CREATININE-BSD FRML MDRD: 94 ML/MIN/1.73SQ M
GLUCOSE SERPL-MCNC: 116 MG/DL (ref 65–140)
HCT VFR BLD AUTO: 38.4 % (ref 34.8–46.1)
HGB BLD-MCNC: 12.5 G/DL (ref 11.5–15.4)
IMM GRANULOCYTES # BLD AUTO: 0.06 THOUSAND/UL (ref 0–0.2)
IMM GRANULOCYTES NFR BLD AUTO: 1 % (ref 0–2)
LYMPHOCYTES # BLD AUTO: 2.78 THOUSANDS/ΜL (ref 0.6–4.47)
LYMPHOCYTES NFR BLD AUTO: 22 % (ref 14–44)
MCH RBC QN AUTO: 27.7 PG (ref 26.8–34.3)
MCHC RBC AUTO-ENTMCNC: 32.6 G/DL (ref 31.4–37.4)
MCV RBC AUTO: 85 FL (ref 82–98)
MONOCYTES # BLD AUTO: 1.03 THOUSAND/ΜL (ref 0.17–1.22)
MONOCYTES NFR BLD AUTO: 8 % (ref 4–12)
NEUTROPHILS # BLD AUTO: 8.23 THOUSANDS/ΜL (ref 1.85–7.62)
NEUTS SEG NFR BLD AUTO: 65 % (ref 43–75)
NRBC BLD AUTO-RTO: 0 /100 WBCS
PLATELET # BLD AUTO: 262 THOUSANDS/UL (ref 149–390)
PMV BLD AUTO: 10 FL (ref 8.9–12.7)
POTASSIUM SERPL-SCNC: 3.8 MMOL/L (ref 3.5–5.3)
PROT SERPL-MCNC: 6.7 G/DL (ref 6.4–8.2)
RBC # BLD AUTO: 4.52 MILLION/UL (ref 3.81–5.12)
RSV RNA RESP QL NAA+PROBE: NEGATIVE
SARS-COV-2 RNA RESP QL NAA+PROBE: NEGATIVE
SARS-COV-2 RNA RESP QL NAA+PROBE: NEGATIVE
SODIUM SERPL-SCNC: 138 MMOL/L (ref 136–145)
WBC # BLD AUTO: 12.5 THOUSAND/UL (ref 4.31–10.16)

## 2021-12-26 PROCEDURE — 80053 COMPREHEN METABOLIC PANEL: CPT | Performed by: EMERGENCY MEDICINE

## 2021-12-26 PROCEDURE — 36415 COLL VENOUS BLD VENIPUNCTURE: CPT

## 2021-12-26 PROCEDURE — 81025 URINE PREGNANCY TEST: CPT | Performed by: EMERGENCY MEDICINE

## 2021-12-26 PROCEDURE — G1004 CDSM NDSC: HCPCS

## 2021-12-26 PROCEDURE — 85025 COMPLETE CBC W/AUTO DIFF WBC: CPT | Performed by: EMERGENCY MEDICINE

## 2021-12-26 PROCEDURE — 84484 ASSAY OF TROPONIN QUANT: CPT | Performed by: EMERGENCY MEDICINE

## 2021-12-26 PROCEDURE — 0241U HB NFCT DS VIR RESP RNA 4 TRGT: CPT | Performed by: EMERGENCY MEDICINE

## 2021-12-26 PROCEDURE — 99284 EMERGENCY DEPT VISIT MOD MDM: CPT

## 2021-12-26 PROCEDURE — 99284 EMERGENCY DEPT VISIT MOD MDM: CPT | Performed by: EMERGENCY MEDICINE

## 2021-12-26 PROCEDURE — 93005 ELECTROCARDIOGRAM TRACING: CPT

## 2021-12-26 PROCEDURE — 71275 CT ANGIOGRAPHY CHEST: CPT

## 2021-12-26 RX ADMIN — IOHEXOL 100 ML: 350 INJECTION, SOLUTION INTRAVENOUS at 11:46

## 2021-12-26 NOTE — Clinical Note
Anjum Sosa was seen and treated in our emergency department on 12/26/2021  Diagnosis:     Kaci Kitchen  may return to work on return date  She may return on this date: 12/28/2021         If you have any questions or concerns, please don't hesitate to call        Mayito Steward MD    ______________________________           _______________          _______________  Hospital Representative                              Date                                Time

## 2021-12-27 LAB
ATRIAL RATE: 102 BPM
P AXIS: 31 DEGREES
PR INTERVAL: 156 MS
QRS AXIS: 41 DEGREES
QRSD INTERVAL: 88 MS
QT INTERVAL: 328 MS
QTC INTERVAL: 417 MS
T WAVE AXIS: 16 DEGREES
VENTRICULAR RATE: 97 BPM

## 2021-12-27 PROCEDURE — 93010 ELECTROCARDIOGRAM REPORT: CPT | Performed by: INTERNAL MEDICINE

## 2021-12-27 NOTE — ED PROVIDER NOTES
History  Chief Complaint   Patient presents with    Cough     Cough since , noticed blood-tinged mucous this AM  2 negative COVID tests a few weeks ago, pending COVID test from   On 2nd course of abx treatment that started on Thursday and taking steroids and cough syrup with minimal relief  History provided by:  Patient   used: No    Cough  Associated symptoms: no chest pain, no chills, no diaphoresis, no fever, no headaches, no rash, no shortness of breath, no sore throat and no wheezing      Patient is a 82-year-old female presenting to emergency department due to cough  Has had a cough since beginning of the month, is on 2nd round of antibiotics, doxycycline  States was taking steroids and cough syrup as well  Minimal improvement  Today noted cough with phlegm with blood streak to wait  No history of blood clots  No calf pain tenderness  No recent travel  No recent surgeries  Has had COVID test which were negative  Denies chest pain  Denies shortness of breath  MDM cardiac workup done, will scan for PE, re-evaluate    Discussed results with patient  She will follow-up with family doctor  Also has follow up with pulmonology  Prior to Admission Medications   Prescriptions Last Dose Informant Patient Reported? Taking?    ALPRAZolam (XANAX) 0 25 mg tablet   No No   Sig: Take 1 tablet (0 25 mg total) by mouth daily at bedtime as needed (second line bladder spasm)   Patient not taking: Reported on 2021    Omeprazole (PRILOSEC PO)   Yes No   Sig: omeprazole   desloratadine (CLARINEX REDITAB) 5 MG disintegrating tablet  Self Yes No   Sig: Take 10 mg by mouth daily    Patient not taking: Reported on 2021    doxycycline (ADOXA) 100 MG tablet   No No   Sig: Take 1 tablet (100 mg total) by mouth 2 (two) times a day for 7 days   fluticasone (FLONASE SENSIMIST) 27 5 MCG/SPRAY nasal spray  Self Yes No   Si sprays into each nostril daily    Patient not taking: Reported on 12/23/2021    guaifenesin-codeine (GUAIFENESIN AC) 100-10 MG/5ML liquid   No No   Sig: TAKE 1-2 TSP BY  MOUTH EVERY 4-6 HOURS AS NEEDED FOR COUGH   lansoprazole (PREVACID SOLUTAB) 30 mg disintegrating tablet  Self Yes No   Sig: Take 30 mg by mouth 2 (two) times a day    Patient not taking: Reported on 12/23/2021    metoclopramide (REGLAN) 5 mg tablet  Self Yes No   Sig: Take 5 mg by mouth 3 (three) times a day as needed    oxyCODONE-acetaminophen (PERCOCET) 5-325 mg per tablet   No No   Sig: Take 1 tablet by mouth every 8 (eight) hours as needed for moderate pain for up to 8 dosesMax Daily Amount: 3 tablets   Patient not taking: Reported on 12/23/2021    pantoprazole (PROTONIX) 40 mg tablet   Yes No   tamsulosin (FLOMAX) 0 4 mg   No No   Sig: Take 1 capsule (0 4 mg total) by mouth daily with dinner   Patient not taking: Reported on 12/23/2021       Facility-Administered Medications: None       Past Medical History:   Diagnosis Date    Gastroparesis     Migraine     Pseudotumor cerebri     Renal disorder     kidney stones       Past Surgical History:   Procedure Laterality Date    CARPAL TUNNEL RELEASE Right     FL RETROGRADE PYELOGRAM  6/26/2021    FL RETROGRADE PYELOGRAM  7/10/2021    AZ CYSTO/URETERO W/LITHOTRIPSY &INDWELL STENT INSRT Left 6/26/2021    Procedure: CYSTOSCOPY URETEROSCOPY WITH , RETROGRADE PYELOGRAM AND INSERTION STENT URETERAL;  Surgeon: Emma Brady MD;  Location: [address] [place] [address];  Service: Urology    AZ CYSTO/URETERO W/LITHOTRIPSY &INDWELL STENT INSRT Left 7/10/2021    Procedure: CYSTOSCOPY, URETEROSCOPY, STONE MANIPULATION WITH BASKET EXTRACTION, RETROGRADE PYELOGRAM AND INSERTION STENT URETERAL;  Surgeon: Emma Brady MD;  Location: [facility];  Service: Urology       History reviewed  No pertinent family history  I have reviewed and agree with the history as documented      E-Cigarette/Vaping    E-Cigarette Use Never User      E-Cigarette/Vaping Substances Social History     Tobacco Use    Smoking status: Never Smoker    Smokeless tobacco: Never Used   Vaping Use    Vaping Use: Never used   Substance Use Topics    Alcohol use: Never    Drug use: Never       Review of Systems   Constitutional: Negative for chills, diaphoresis and fever  HENT: Negative for congestion and sore throat  Respiratory: Positive for cough  Negative for shortness of breath, wheezing and stridor  Cardiovascular: Negative for chest pain, palpitations and leg swelling  Gastrointestinal: Negative for abdominal pain, blood in stool, diarrhea, nausea and vomiting  Genitourinary: Negative for dysuria, frequency and urgency  Musculoskeletal: Negative for neck pain and neck stiffness  Skin: Negative for pallor and rash  Neurological: Negative for dizziness, syncope, weakness, light-headedness and headaches  All other systems reviewed and are negative  Physical Exam  Physical Exam  Vitals reviewed  Constitutional:       Appearance: Normal appearance  She is well-developed  HENT:      Head: Normocephalic and atraumatic  Eyes:      Extraocular Movements: Extraocular movements intact  Pupils: Pupils are equal, round, and reactive to light  Cardiovascular:      Rate and Rhythm: Normal rate and regular rhythm  Heart sounds: Normal heart sounds  Pulmonary:      Effort: Pulmonary effort is normal  No respiratory distress  Breath sounds: Normal breath sounds  Abdominal:      General: Bowel sounds are normal       Palpations: Abdomen is soft  Tenderness: There is no abdominal tenderness  Musculoskeletal:         General: No swelling or tenderness  Normal range of motion  Cervical back: Normal range of motion and neck supple  Skin:     General: Skin is warm and dry  Capillary Refill: Capillary refill takes less than 2 seconds  Neurological:      General: No focal deficit present        Mental Status: She is alert and oriented to person, place, and time  Vital Signs  ED Triage Vitals   Temperature Pulse Respirations Blood Pressure SpO2   12/26/21 0900 12/26/21 0900 12/26/21 0900 12/26/21 0900 12/26/21 0900   98 °F (36 7 °C) (!) 109 18 162/87 95 %      Temp Source Heart Rate Source Patient Position - Orthostatic VS BP Location FiO2 (%)   12/26/21 0900 12/26/21 0900 12/26/21 1156 12/26/21 0900 --   Oral Monitor Lying Left arm       Pain Score       --                  Vitals:    12/26/21 0900 12/26/21 1156 12/26/21 1313   BP: 162/87 129/72 119/75   Pulse: (!) 109 98 105   Patient Position - Orthostatic VS:  Lying Sitting         Visual Acuity      ED Medications  Medications   iohexol (OMNIPAQUE) 350 MG/ML injection (SINGLE-DOSE) 100 mL (100 mL Intravenous Given 12/26/21 1146)       Diagnostic Studies  Results Reviewed     Procedure Component Value Units Date/Time    COVID/FLU/RSV - 2 hour TAT [[id number]]  (Normal) Collected: 12/26/21 1201    Lab Status: Final result Specimen: Nares from Nose Updated: 12/26/21 1311     SARS-CoV-2 Negative     INFLUENZA A PCR Negative     INFLUENZA B PCR Negative     RSV PCR Negative    Narrative:      FOR PEDIATRIC PATIENTS - copy/paste COVID Guidelines URL to browser: [email or web address]  [employer]     This test has been authorized by FDA under an EUA (Emergency Use Assay) for use by authorized laboratories  Clinical caution and judgement should be used with the interpretation of these results with consideration of the clinical impression and other laboratory testing  Testing reported as "Positive" or "Negative" has been proven to be accurate according to standard laboratory validation requirements  All testing is performed with control materials showing appropriate reactivity at standard intervals      POCT pregnancy, urine [[id number]]  (Normal) Resulted: 12/26/21 1127    Lab Status: Final result Updated: 12/26/21 1127     EXT PREG TEST UR (Ref: Negative) Negative     Control valid    HS Troponin 0hr (reflex protocol) [[id number]]  (Normal) Collected: 12/26/21 0909    Lab Status: Final result Specimen: Blood from Arm, Left Updated: 12/26/21 0959     hs TnI 0hr 2 ng/L     Comprehensive metabolic panel [[id number]]  (Abnormal) Collected: 12/26/21 0909    Lab Status: Final result Specimen: Blood from Arm, Left Updated: 12/26/21 0940     Sodium 138 mmol/L      Potassium 3 8 mmol/L      Chloride 103 mmol/L      CO2 27 mmol/L      ANION GAP 8 mmol/L      BUN 12 mg/dL      Creatinine 0 79 mg/dL      Glucose 116 mg/dL      Calcium 8 5 mg/dL      Corrected Calcium 9 1 mg/dL      AST 22 U/L      ALT 30 U/L      Alkaline Phosphatase 71 U/L      Total Protein 6 7 g/dL      Albumin 3 2 g/dL      Total Bilirubin 0 35 mg/dL      eGFR 94 ml/min/1 73sq m     Narrative:      [facility] guidelines for Chronic Kidney Disease (CKD):     Stage 1 with normal or high GFR (GFR > 90 mL/min/1 73 square meters)    Stage 2 Mild CKD (GFR = 60-89 mL/min/1 73 square meters)    Stage 3A Moderate CKD (GFR = 45-59 mL/min/1 73 square meters)    Stage 3B Moderate CKD (GFR = 30-44 mL/min/1 73 square meters)    Stage 4 Severe CKD (GFR = 15-29 mL/min/1 73 square meters)    Stage 5 End Stage CKD (GFR <15 mL/min/1 73 square meters)  Note: GFR calculation is accurate only with a steady state creatinine    CBC and differential [[id number]]  (Abnormal) Collected: 12/26/21 0909    Lab Status: Final result Specimen: Blood from Arm, Left Updated: 12/26/21 0926     WBC 12 50 Thousand/uL      RBC 4 52 Million/uL      Hemoglobin 12 5 g/dL      Hematocrit 38 4 %      MCV 85 fL      MCH 27 7 pg      MCHC 32 6 g/dL      RDW 14 2 %      MPV 10 0 fL      Platelets 351 Thousands/uL      nRBC 0 /100 WBCs      Neutrophils Relative 65 %      Immat GRANS % 1 %      Lymphocytes Relative 22 %      Monocytes Relative 8 %      Eosinophils Relative 3 %      Basophils Relative 1 %      Neutrophils Absolute 8 23 Thousands/µL      Immature Grans Absolute 0 06 Thousand/uL      Lymphocytes Absolute 2 78 Thousands/µL      Monocytes Absolute 1 03 Thousand/µL      Eosinophils Absolute 0 34 Thousand/µL      Basophils Absolute 0 06 Thousands/µL                  CTA ED chest PE study   Final Result by Pallavi Mccarty MD (12/26 1238)      1  Limited by suboptimal contrast bolus without gross pulmonary embolus identified  2   Diffuse hepatic steatosis  Workstation performed: [id number]                    Procedures  Procedures         ED Course  ED Course as of 12/26/21 1938   Sun Dec 26, 2021   1145 Ecg shows rate of 97, sinus, normal axis, normal qrs, nonspecific st and t waves, independently interpreted by me                               SBIRT 20yo+      Most Recent Value   SBIRT (23 yo +)    In order to provide better care to our patients, we are screening all of our patients for alcohol and drug use  Would it be okay to ask you these screening questions? Yes Filed at: 12/26/2021 9406   Initial Alcohol Screen: US AUDIT-C     1  How often do you have a drink containing alcohol? 0 Filed at: 12/26/2021 0903   2  How many drinks containing alcohol do you have on a typical day you are drinking? 0 Filed at: 12/26/2021 0903   3a  Male UNDER 65: How often do you have five or more drinks on one occasion? 0 Filed at: 12/26/2021 0903   3b  FEMALE Any Age, or MALE 65+: How often do you have 4 or more drinks on one occassion? 0 Filed at: 12/26/2021 0903   Audit-C Score 0 Filed at: 12/26/2021 2044   JOSE: How many times in the past year have you    Used an illegal drug or used a prescription medication for non-medical reasons?  Never Filed at: 12/26/2021 0903                    MDM    Disposition  Final diagnoses:   Bronchitis   Hepatic steatosis     Time reflects when diagnosis was documented in both MDM as applicable and the Disposition within this note     Time User Action Codes Description Comment 12/26/2021  1:20 PM Toshia Hammond Add [J40] Bronchitis     12/26/2021  1:20 PM Toshia Hammond Add [K76 0] Hepatic steatosis       ED Disposition     ED Disposition Condition Date/Time Comment    Discharge Stable Sun Dec 26, 2021  1:20 PM Leocadimorris Masker discharge to home/self care              Follow-up Information     Follow up With Specialties Details Why Contact Info Additional Information    Dima Yepez, DO Family Medicine In 3 days re-evaluation [address]   [phone]       [address] Emergency Department Emergency Medicine  As needed, If symptoms worsen [address] [facility] [address] [facility], [address], [place], [place], [address] [facility] Schedule an appointment as soon as possible for a visit  cough [address] [phone]  [phone] [facility] [address] [facility], [address] [place] [address] Swathi Pia [address] [place], [place], [facility]          Discharge Medication List as of 12/26/2021  1:21 PM      CONTINUE these medications which have NOT CHANGED    Details   ALPRAZolam (XANAX) 0 25 mg tablet Take 1 tablet (0 25 mg total) by mouth daily at bedtime as needed (second line bladder spasm), Starting Sun 6/27/2021, Normal      desloratadine (CLARINEX REDITAB) 5 MG disintegrating tablet Take 10 mg by mouth daily , Historical Med      doxycycline (ADOXA) 100 MG tablet Take 1 tablet (100 mg total) by mouth 2 (two) times a day for 7 days, Starting Thu 12/23/2021, Until Thu 12/30/2021, Normal      fluticasone (FLONASE SENSIMIST) 27 5 MCG/SPRAY nasal spray 2 sprays into each nostril daily , Historical Med      guaifenesin-codeine (GUAIFENESIN AC) 100-10 MG/5ML liquid TAKE 1-2 TSP BY  MOUTH EVERY 4-6 HOURS AS NEEDED FOR COUGH, Normal      lansoprazole (PREVACID SOLUTAB) 30 mg disintegrating tablet Take 30 mg by mouth 2 (two) times a day , Starting Fri 11/9/2018, Historical Med      metoclopramide (REGLAN) 5 mg tablet Take 5 mg by mouth 3 (three) times a day as needed , Starting Sun 11/25/2018, Historical Med      Omeprazole (PRILOSEC PO) omeprazole, Historical Med      oxyCODONE-acetaminophen (PERCOCET) 5-325 mg per tablet Take 1 tablet by mouth every 8 (eight) hours as needed for moderate pain for up to 8 dosesMax Daily Amount: 3 tablets, Starting Sat 7/10/2021, Normal      pantoprazole (PROTONIX) 40 mg tablet Starting Mon 12/20/2021, Historical Med      tamsulosin (FLOMAX) 0 4 mg Take 1 capsule (0 4 mg total) by mouth daily with dinner, Starting Sun 6/27/2021, Normal             No discharge procedures on file      PDMP Review       Value Time User    PDMP Reviewed  Yes 12/23/2021  7:49 PM Leonardo Coyle, [facility] Provider  Electronically Signed by           Sharonda Patel MD  12/26/21 1323

## 2022-05-05 ENCOUNTER — HOSPITAL ENCOUNTER (OUTPATIENT)
Facility: HOSPITAL | Age: 42
Setting detail: OBSERVATION
Discharge: HOME | End: 2022-05-06
Attending: EMERGENCY MEDICINE | Admitting: HOSPITALIST
Payer: COMMERCIAL

## 2022-05-05 ENCOUNTER — APPOINTMENT (EMERGENCY)
Dept: CT IMAGING | Facility: HOSPITAL | Age: 42
End: 2022-05-05
Payer: COMMERCIAL

## 2022-05-05 DIAGNOSIS — D72.829 LEUKOCYTOSIS: ICD-10-CM

## 2022-05-05 DIAGNOSIS — R10.31 RLQ ABDOMINAL PAIN: Primary | ICD-10-CM

## 2022-05-05 LAB
ALBUMIN SERPL BCP-MCNC: 4 G/DL (ref 3.5–5)
ALP SERPL-CCNC: 71 U/L (ref 34–104)
ALT SERPL W P-5'-P-CCNC: 27 U/L (ref 7–52)
ANION GAP SERPL CALCULATED.3IONS-SCNC: 11 MMOL/L (ref 4–13)
AST SERPL W P-5'-P-CCNC: 21 U/L (ref 13–39)
BACTERIA UR QL AUTO: NORMAL /HPF
BASOPHILS # BLD MANUAL: 0 THOUSAND/UL (ref 0–0.1)
BASOPHILS NFR MAR MANUAL: 0 % (ref 0–1)
BILIRUB SERPL-MCNC: 0.27 MG/DL (ref 0.2–1)
BILIRUB UR QL STRIP: NEGATIVE
BUN SERPL-MCNC: 22 MG/DL (ref 5–25)
CALCIUM SERPL-MCNC: 8.6 MG/DL (ref 8.4–10.2)
CHLORIDE SERPL-SCNC: 105 MMOL/L (ref 96–108)
CLARITY UR: CLEAR
CO2 SERPL-SCNC: 24 MMOL/L (ref 21–32)
COLOR UR: YELLOW
CREAT SERPL-MCNC: 0.73 MG/DL (ref 0.6–1.3)
EOSINOPHIL # BLD MANUAL: 0 THOUSAND/UL (ref 0–0.4)
EOSINOPHIL NFR BLD MANUAL: 0 % (ref 0–6)
ERYTHROCYTE [DISTWIDTH] IN BLOOD BY AUTOMATED COUNT: 14.1 % (ref 11.6–15.1)
EXT PREG TEST URINE: NEGATIVE
EXT. CONTROL ED NAV: NORMAL
GFR SERPL CREATININE-BSD FRML MDRD: 104 ML/MIN/1.73SQ M
GLUCOSE SERPL-MCNC: 118 MG/DL (ref 65–140)
GLUCOSE UR STRIP-MCNC: NEGATIVE MG/DL
HCT VFR BLD AUTO: 43.2 % (ref 34.8–46.1)
HGB BLD-MCNC: 14 G/DL (ref 11.5–15.4)
HGB UR QL STRIP.AUTO: ABNORMAL
HOLD SPECIMEN: NORMAL
KETONES UR STRIP-MCNC: NEGATIVE MG/DL
LEUKOCYTE ESTERASE UR QL STRIP: NEGATIVE
LIPASE SERPL-CCNC: 31 U/L (ref 11–82)
LYMPHOCYTES # BLD AUTO: 42 % (ref 14–44)
LYMPHOCYTES # BLD AUTO: 8.1 THOUSAND/UL (ref 0.6–4.47)
MCH RBC QN AUTO: 28.2 PG (ref 26.8–34.3)
MCHC RBC AUTO-ENTMCNC: 32.4 G/DL (ref 31.4–37.4)
MCV RBC AUTO: 87 FL (ref 82–98)
MONOCYTES # BLD AUTO: 1.35 THOUSAND/UL (ref 0–1.22)
MONOCYTES NFR BLD: 7 % (ref 4–12)
MYELOCYTES NFR BLD MANUAL: 1 % (ref 0–1)
NEUTROPHILS # BLD MANUAL: 9.25 THOUSAND/UL (ref 1.85–7.62)
NEUTS BAND NFR BLD MANUAL: 1 % (ref 0–8)
NEUTS SEG NFR BLD AUTO: 47 % (ref 43–75)
NITRITE UR QL STRIP: NEGATIVE
NON-SQ EPI CELLS URNS QL MICRO: NORMAL /HPF
PH UR STRIP.AUTO: 5.5 [PH] (ref 4.5–8)
PLATELET # BLD AUTO: 375 THOUSANDS/UL (ref 149–390)
PLATELET BLD QL SMEAR: ADEQUATE
PMV BLD AUTO: 9.7 FL (ref 8.9–12.7)
POTASSIUM SERPL-SCNC: 4.2 MMOL/L (ref 3.5–5.3)
PROT SERPL-MCNC: 7 G/DL (ref 6.4–8.4)
PROT UR STRIP-MCNC: NEGATIVE MG/DL
RBC # BLD AUTO: 4.96 MILLION/UL (ref 3.81–5.12)
RBC #/AREA URNS AUTO: NORMAL /HPF
RBC MORPH BLD: NORMAL
SODIUM SERPL-SCNC: 140 MMOL/L (ref 135–147)
SP GR UR STRIP.AUTO: >=1.03 (ref 1–1.03)
UROBILINOGEN UR QL STRIP.AUTO: 0.2 E.U./DL
VARIANT LYMPHS # BLD AUTO: 2 %
WBC # BLD AUTO: 19.28 THOUSAND/UL (ref 4.31–10.16)
WBC #/AREA URNS AUTO: NORMAL /HPF

## 2022-05-05 PROCEDURE — 83036 HEMOGLOBIN GLYCOSYLATED A1C: CPT | Performed by: INTERNAL MEDICINE

## 2022-05-05 PROCEDURE — 99285 EMERGENCY DEPT VISIT HI MDM: CPT

## 2022-05-05 PROCEDURE — 85027 COMPLETE CBC AUTOMATED: CPT | Performed by: EMERGENCY MEDICINE

## 2022-05-05 PROCEDURE — 74176 CT ABD & PELVIS W/O CONTRAST: CPT

## 2022-05-05 PROCEDURE — 96374 THER/PROPH/DIAG INJ IV PUSH: CPT

## 2022-05-05 PROCEDURE — 36415 COLL VENOUS BLD VENIPUNCTURE: CPT

## 2022-05-05 PROCEDURE — 81001 URINALYSIS AUTO W/SCOPE: CPT

## 2022-05-05 PROCEDURE — 80053 COMPREHEN METABOLIC PANEL: CPT | Performed by: EMERGENCY MEDICINE

## 2022-05-05 PROCEDURE — 81025 URINE PREGNANCY TEST: CPT | Performed by: EMERGENCY MEDICINE

## 2022-05-05 PROCEDURE — G1004 CDSM NDSC: HCPCS

## 2022-05-05 PROCEDURE — 83690 ASSAY OF LIPASE: CPT | Performed by: EMERGENCY MEDICINE

## 2022-05-05 PROCEDURE — 99285 EMERGENCY DEPT VISIT HI MDM: CPT | Performed by: PHYSICIAN ASSISTANT

## 2022-05-05 PROCEDURE — 85007 BL SMEAR W/DIFF WBC COUNT: CPT | Performed by: EMERGENCY MEDICINE

## 2022-05-05 RX ORDER — KETOROLAC TROMETHAMINE 30 MG/ML
15 INJECTION, SOLUTION INTRAMUSCULAR; INTRAVENOUS ONCE
Status: COMPLETED | OUTPATIENT
Start: 2022-05-05 | End: 2022-05-05

## 2022-05-05 RX ADMIN — KETOROLAC TROMETHAMINE 15 MG: 30 INJECTION, SOLUTION INTRAMUSCULAR at 20:16

## 2022-05-06 VITALS
OXYGEN SATURATION: 94 % | DIASTOLIC BLOOD PRESSURE: 66 MMHG | TEMPERATURE: 97.8 F | SYSTOLIC BLOOD PRESSURE: 119 MMHG | HEART RATE: 81 BPM | RESPIRATION RATE: 16 BRPM

## 2022-05-06 PROBLEM — R10.9 ABDOMINAL PAIN: Status: ACTIVE | Noted: 2022-05-06

## 2022-05-06 PROBLEM — D72.829 LEUKOCYTOSIS: Status: ACTIVE | Noted: 2022-05-06

## 2022-05-06 LAB
ANION GAP SERPL CALCULATED.3IONS-SCNC: 10 MMOL/L (ref 4–13)
BASOPHILS # BLD AUTO: 0.11 THOUSANDS/ΜL (ref 0–0.1)
BASOPHILS NFR BLD AUTO: 1 % (ref 0–1)
BUN SERPL-MCNC: 24 MG/DL (ref 5–25)
CALCIUM SERPL-MCNC: 8.2 MG/DL (ref 8.4–10.2)
CHLORIDE SERPL-SCNC: 103 MMOL/L (ref 96–108)
CO2 SERPL-SCNC: 26 MMOL/L (ref 21–32)
CREAT SERPL-MCNC: 0.7 MG/DL (ref 0.6–1.3)
EOSINOPHIL # BLD AUTO: 0.24 THOUSAND/ΜL (ref 0–0.61)
EOSINOPHIL NFR BLD AUTO: 2 % (ref 0–6)
ERYTHROCYTE [DISTWIDTH] IN BLOOD BY AUTOMATED COUNT: 14 % (ref 11.6–15.1)
EST. AVERAGE GLUCOSE BLD GHB EST-MCNC: 131 MG/DL
GFR SERPL CREATININE-BSD FRML MDRD: 109 ML/MIN/1.73SQ M
GLUCOSE SERPL-MCNC: 91 MG/DL (ref 65–140)
HBA1C MFR BLD: 6.2 %
HCT VFR BLD AUTO: 39 % (ref 34.8–46.1)
HGB BLD-MCNC: 12.7 G/DL (ref 11.5–15.4)
IMM GRANULOCYTES # BLD AUTO: 0.25 THOUSAND/UL (ref 0–0.2)
IMM GRANULOCYTES NFR BLD AUTO: 2 % (ref 0–2)
LYMPHOCYTES # BLD AUTO: 5.27 THOUSANDS/ΜL (ref 0.6–4.47)
LYMPHOCYTES NFR BLD AUTO: 37 % (ref 14–44)
MCH RBC QN AUTO: 28.3 PG (ref 26.8–34.3)
MCHC RBC AUTO-ENTMCNC: 32.6 G/DL (ref 31.4–37.4)
MCV RBC AUTO: 87 FL (ref 82–98)
MONOCYTES # BLD AUTO: 0.96 THOUSAND/ΜL (ref 0.17–1.22)
MONOCYTES NFR BLD AUTO: 7 % (ref 4–12)
NEUTROPHILS # BLD AUTO: 7.47 THOUSANDS/ΜL (ref 1.85–7.62)
NEUTS SEG NFR BLD AUTO: 51 % (ref 43–75)
NRBC BLD AUTO-RTO: 0 /100 WBCS
PLATELET # BLD AUTO: 316 THOUSANDS/UL (ref 149–390)
PMV BLD AUTO: 9.8 FL (ref 8.9–12.7)
POTASSIUM SERPL-SCNC: 4.3 MMOL/L (ref 3.5–5.3)
RBC # BLD AUTO: 4.49 MILLION/UL (ref 3.81–5.12)
SODIUM SERPL-SCNC: 139 MMOL/L (ref 135–147)
WBC # BLD AUTO: 14.3 THOUSAND/UL (ref 4.31–10.16)

## 2022-05-06 PROCEDURE — 99236 HOSP IP/OBS SAME DATE HI 85: CPT | Performed by: HOSPITALIST

## 2022-05-06 PROCEDURE — 80048 BASIC METABOLIC PNL TOTAL CA: CPT | Performed by: INTERNAL MEDICINE

## 2022-05-06 PROCEDURE — 85025 COMPLETE CBC W/AUTO DIFF WBC: CPT | Performed by: INTERNAL MEDICINE

## 2022-05-06 PROCEDURE — 36415 COLL VENOUS BLD VENIPUNCTURE: CPT | Performed by: INTERNAL MEDICINE

## 2022-05-06 PROCEDURE — 99203 OFFICE O/P NEW LOW 30 MIN: CPT | Performed by: SURGERY

## 2022-05-06 PROCEDURE — RECHECK: Performed by: HOSPITALIST

## 2022-05-06 RX ORDER — PANTOPRAZOLE SODIUM 40 MG/1
40 TABLET, DELAYED RELEASE ORAL
Status: DISCONTINUED | OUTPATIENT
Start: 2022-05-06 | End: 2022-05-06 | Stop reason: HOSPADM

## 2022-05-06 RX ORDER — DICYCLOMINE HYDROCHLORIDE 10 MG/1
10 CAPSULE ORAL
Status: DISCONTINUED | OUTPATIENT
Start: 2022-05-06 | End: 2022-05-06

## 2022-05-06 RX ORDER — PANTOPRAZOLE SODIUM 20 MG/1
20 TABLET, DELAYED RELEASE ORAL
Status: DISCONTINUED | OUTPATIENT
Start: 2022-05-06 | End: 2022-05-06 | Stop reason: HOSPADM

## 2022-05-06 RX ORDER — MAGNESIUM HYDROXIDE/ALUMINUM HYDROXICE/SIMETHICONE 120; 1200; 1200 MG/30ML; MG/30ML; MG/30ML
30 SUSPENSION ORAL EVERY 4 HOURS PRN
Qty: 355 ML | Refills: 0 | Status: SHIPPED | OUTPATIENT
Start: 2022-05-06

## 2022-05-06 RX ORDER — ENOXAPARIN SODIUM 100 MG/ML
40 INJECTION SUBCUTANEOUS DAILY
Status: DISCONTINUED | OUTPATIENT
Start: 2022-05-06 | End: 2022-05-06 | Stop reason: DRUGHIGH

## 2022-05-06 RX ORDER — MAGNESIUM HYDROXIDE/ALUMINUM HYDROXICE/SIMETHICONE 120; 1200; 1200 MG/30ML; MG/30ML; MG/30ML
30 SUSPENSION ORAL EVERY 4 HOURS PRN
Status: DISCONTINUED | OUTPATIENT
Start: 2022-05-06 | End: 2022-05-06 | Stop reason: HOSPADM

## 2022-05-06 RX ORDER — MAGNESIUM HYDROXIDE/ALUMINUM HYDROXICE/SIMETHICONE 120; 1200; 1200 MG/30ML; MG/30ML; MG/30ML
30 SUSPENSION ORAL ONCE
Status: COMPLETED | OUTPATIENT
Start: 2022-05-06 | End: 2022-05-06

## 2022-05-06 RX ORDER — SODIUM CHLORIDE, SODIUM GLUCONATE, SODIUM ACETATE, POTASSIUM CHLORIDE, MAGNESIUM CHLORIDE, SODIUM PHOSPHATE, DIBASIC, AND POTASSIUM PHOSPHATE .53; .5; .37; .037; .03; .012; .00082 G/100ML; G/100ML; G/100ML; G/100ML; G/100ML; G/100ML; G/100ML
100 INJECTION, SOLUTION INTRAVENOUS CONTINUOUS
Status: DISCONTINUED | OUTPATIENT
Start: 2022-05-06 | End: 2022-05-06

## 2022-05-06 RX ORDER — METOCLOPRAMIDE 5 MG/1
5 TABLET ORAL 3 TIMES DAILY PRN
Status: DISCONTINUED | OUTPATIENT
Start: 2022-05-06 | End: 2022-05-06 | Stop reason: HOSPADM

## 2022-05-06 RX ORDER — ENOXAPARIN SODIUM 100 MG/ML
40 INJECTION SUBCUTANEOUS EVERY 12 HOURS SCHEDULED
Status: DISCONTINUED | OUTPATIENT
Start: 2022-05-06 | End: 2022-05-06 | Stop reason: HOSPADM

## 2022-05-06 RX ADMIN — SODIUM CHLORIDE, SODIUM GLUCONATE, SODIUM ACETATE, POTASSIUM CHLORIDE, MAGNESIUM CHLORIDE, SODIUM PHOSPHATE, DIBASIC, AND POTASSIUM PHOSPHATE 100 ML/HR: .53; .5; .37; .037; .03; .012; .00082 INJECTION, SOLUTION INTRAVENOUS at 03:00

## 2022-05-06 RX ADMIN — PANTOPRAZOLE SODIUM 40 MG: 40 TABLET, DELAYED RELEASE ORAL at 05:18

## 2022-05-06 RX ADMIN — ENOXAPARIN SODIUM 40 MG: 40 INJECTION SUBCUTANEOUS at 09:59

## 2022-05-06 RX ADMIN — METOCLOPRAMIDE 5 MG: 5 TABLET ORAL at 09:59

## 2022-05-06 RX ADMIN — ALUMINA, MAGNESIA, AND SIMETHICONE ORAL SUSPENSION REGULAR STRENGTH 30 ML: 1200; 1200; 120 SUSPENSION ORAL at 02:41

## 2022-05-06 RX ADMIN — DICYCLOMINE HYDROCHLORIDE 10 MG: 10 CAPSULE ORAL at 02:00

## 2022-05-06 NOTE — ASSESSMENT & PLAN NOTE
· Unclear etiology of patient's leukocytosis  Patient seems to have elevated leukocytosis chronically between 12-14  · Patient also states that she has had a steroid injection for her knees, however this is less likely to cause her leukocytosis  · Continue outpatient monitoring

## 2022-05-06 NOTE — ED CARE HANDOFF
Emergency Department Sign Out Note        Sign out and transfer of care from [facility]  See Separate Emergency Department note  The patient, Gatito Kyle, was evaluated by the previous provider for abdominal pain  Workup Completed:  History and physical, labs with interpretation, CT ordered    ED Course / Workup Pending (followup):  CT results            ED Course as of 05/06/22 0956   u May 05, 2022   2130 Received patient from [facility] pending Ct results    9334 Patient continues to have persistent RLQ pain  Leukocytosis of 19,000  No pathology found on exam  Surgery resident will come eval patient shortly and let me know what he thinks   Fri May 06, 2022   1901 [facility] Surgery resident spoke with his attending, they are recommending medical observation at this time, nothing surgical to do at this moment  Procedures  MDM  Number of Diagnoses or Management Options  Leukocytosis: new and requires workup  RLQ abdominal pain: new and requires workup     Amount and/or Complexity of Data Reviewed  Clinical lab tests: ordered and reviewed  Tests in the radiology section of CPT®: ordered and reviewed    Patient Progress  Patient progress: stable          Disposition  Final diagnoses:   RLQ abdominal pain   Leukocytosis     Time reflects when diagnosis was documented in both MDM as applicable and the Disposition within this note     Time User Action Codes Description Comment    5/6/2022 12:48 AM Rosetta Silveira Add [R10 31] RLQ abdominal pain     5/6/2022  1:37 AM Rosetta Silveira Add [T43 721] Leukocytosis       ED Disposition     ED Disposition Condition Date/Time Comment    Admit Stable Fri May 6, 2022  1:37 AM Case was discussed with SLIM/surgery and the patient's admission status was agreed to be Admission Status: observation status to the service of Dr Yaima Slater           Follow-up Information    None       Current Discharge Medication List      CONTINUE these medications which have NOT CHANGED    Details   ALPRAZolam (XANAX) 0 25 mg tablet Take 1 tablet (0 25 mg total) by mouth daily at bedtime as needed (second line bladder spasm)  Qty: 10 tablet, Refills: 0    Associated Diagnoses: Ureteral stone      desloratadine (CLARINEX REDITAB) 5 MG disintegrating tablet Take 10 mg by mouth daily       fluticasone (FLONASE SENSIMIST) 27 5 MCG/SPRAY nasal spray 2 sprays into each nostril daily       guaifenesin-codeine (GUAIFENESIN AC) 100-10 MG/5ML liquid TAKE 1-2 TSP BY  MOUTH EVERY 4-6 HOURS AS NEEDED FOR COUGH  Qty: 180 mL, Refills: 0    Associated Diagnoses: Acute bronchitis, unspecified organism      lansoprazole (PREVACID SOLUTAB) 30 mg disintegrating tablet Take 30 mg by mouth 2 (two) times a day       metoclopramide (REGLAN) 5 mg tablet Take 5 mg by mouth 3 (three) times a day as needed       Omeprazole (PRILOSEC PO) omeprazole      oxyCODONE-acetaminophen (PERCOCET) 5-325 mg per tablet Take 1 tablet by mouth every 8 (eight) hours as needed for moderate pain for up to 8 dosesMax Daily Amount: 3 tablets  Qty: 8 tablet, Refills: 0    Associated Diagnoses: Ureteral stone      pantoprazole (PROTONIX) 40 mg tablet       tamsulosin (FLOMAX) 0 4 mg Take 1 capsule (0 4 mg total) by mouth daily with dinner  Qty: 14 capsule, Refills: 0    Associated Diagnoses: Ureteral stone           No discharge procedures on file         ED Provider  Electronically Signed by     Lena Olivera PA-C  05/06/22 3600

## 2022-05-06 NOTE — ASSESSMENT & PLAN NOTE
Patient has been having right lower quadrant abdominal pain since this morning  Patient has not been able to eat anything  However patient has been able to hydrate herself  Physical examination shows active bowel sounds, tenderness in right lower quadrant with mild diffuse distention  No rebound tenderness  CT of the abdomen does not reveal any abnormalities  Unclear etiology of abdominal pain at this point  · IV fluids  · Continue monitor  · Trialed 1 dose of Bentyl for anti spasmodic affect  · Continue patient Reglan p r n  · Mylanta p r n  · Surgery on board, no surgical intervention at this time

## 2022-05-06 NOTE — ASSESSMENT & PLAN NOTE
· Patient has been having right lower quadrant abdominal pain x 2 days  · Physical examination shows active bowel sounds, tenderness in right lower quadrant with mild diffuse distention  No rebound tenderness  CT of the abdomen wo contrast does not reveal any abnormalities  · Unclear etiology of abdominal pain at this point  Possible abdominal migraines  · Pain improved following IV hydration and bowel rest  · Patient tolerated regular diet    Plan   · Continue patient Reglan p r n    · Mylanta p r n   · Outpatient follow-up with GI

## 2022-05-06 NOTE — ASSESSMENT & PLAN NOTE
· Patient has history of migraines  Currently does not have any headaches  · Could consider abdominal migraines is an etiology for her presentation

## 2022-05-06 NOTE — ASSESSMENT & PLAN NOTE
Unclear etiology of patient's leukocytosis  Patient seems to have elevated leukocytosis chronically between 12-14  Patient also states that she has had a steroid injection for her knees, however this is less likely to cause her leukocytosis  · Will give IV fluids  · Monitor with CBC tomorrow a m

## 2022-05-06 NOTE — ED PROVIDER NOTES
History  Chief Complaint   Patient presents with    Abdominal Pain     Right lower abdominal pain that started yesterday  Pain wraps around to her back  Pt also reports nausea  No vomiting, no diarhea  Denies urinary symptoms  Patient presents to the ER for evaluation of abdominal pain  Patient states that last night she began with vague right lower abdominal pain that has worsened today  States that it occasionally wraps around to her back  Patient states that the pain is worse with movement  States that she took ibuprofen around 8 hours ago with minimal relief  Patient notes associated nausea  States that she has a history of kidney stones however this feels different  Patient denies any history abdominal surgeries  Denies any fevers, headaches, dizziness, chest pain, shortness of breath vomiting, diarrhea, dysuria, urinary frequency, or any other concerning symptoms  States that her LMP was last week  Prior to Admission Medications   Prescriptions Last Dose Informant Patient Reported? Taking?    ALPRAZolam (XANAX) 0 25 mg tablet   No No   Sig: Take 1 tablet (0 25 mg total) by mouth daily at bedtime as needed (second line bladder spasm)   Patient not taking: Reported on 2021    Omeprazole (PRILOSEC PO)   Yes No   Sig: omeprazole   desloratadine (CLARINEX REDITAB) 5 MG disintegrating tablet  Self Yes No   Sig: Take 10 mg by mouth daily    Patient not taking: Reported on 2021    fluticasone (FLONASE SENSIMIST) 27 5 MCG/SPRAY nasal spray  Self Yes No   Si sprays into each nostril daily    Patient not taking: Reported on 2021    guaifenesin-codeine (GUAIFENESIN AC) 100-10 MG/5ML liquid   No No   Sig: TAKE 1-2 TSP BY  MOUTH EVERY 4-6 HOURS AS NEEDED FOR COUGH   lansoprazole (PREVACID SOLUTAB) 30 mg disintegrating tablet  Self Yes No   Sig: Take 30 mg by mouth 2 (two) times a day    Patient not taking: Reported on 2021    metoclopramide (REGLAN) 5 mg tablet  Self Yes No [facility] [phone]        Patient seen and examined in bed.  She was in good spirits         MEDICATIONS  (STANDING):  amiodarone    Tablet 200 milliGRAM(s) Oral daily  amiodarone    Tablet   Oral   amLODIPine   Tablet 5 milliGRAM(s) Oral daily  ascorbic acid 500 milliGRAM(s) Oral two times a day  aspirin  chewable 81 milliGRAM(s) Oral daily  BACItracin   Ointment 1 Application(s) Topical two times a day  ferrous    sulfate 325 milliGRAM(s) Oral two times a day  folic acid 1 milliGRAM(s) Oral daily  heparin  Injectable 5000 Unit(s) SubCutaneous every 8 hours  lidocaine   Patch 1 Patch Transdermal daily  metoprolol tartrate 25 milliGRAM(s) Oral every 12 hours  pantoprazole   Suspension 40 milliGRAM(s) Oral daily  piperacillin/tazobactam IVPB.. 3.375 Gram(s) IV Intermittent every 8 hours      VITAL:  T(C): , Max: 36.8 (19 @ 05:48)  T(F): , Max: 98.2 (19 @ 05:48)  HR: 65 (19 @ 05:48)  BP: 105/52 (19 @ 05:48)  BP(mean): --  RR: 18 (19 @ 05:48)  SpO2: 96% (19 @ 05:48)  Wt(kg): --    I and O's:     @ 07:  -   @ 07:00  --------------------------------------------------------  IN: 1200 mL / OUT: 5925 mL / NET: -4725 mL     @ 07:01  -   @ 08:32  --------------------------------------------------------  IN: 0 mL / OUT: 200 mL / NET: -200 mL          PHYSICAL EXAM:    Constitutional: NAD  Neck:  No JVD  Respiratory: CTAB/L  Cardiovascular: S1 and S2  Gastrointestinal: BS+, soft, NT/ND  Extremities: No peripheral edema  Neurological: A/O x 3, right leg weakness > left   Psychiatric: Normal mood, normal affect  : + [place]  Skin: No rashes  Access: Not applicable    LABS:                        7.9    6.8   )-----------( 302      ( 23 Sep 2019 05:42 )             22.4     -    134<L>  |  99  |  24<H>  ----------------------------<  95  3.9   |  22  |  1.22    Ca    8.7      23 Sep 2019 05:42  Phos  3.7       Mg     1.9         TPro  5.7<L>  /  Alb  2.7<L>  /  TBili  0.2  /  DBili  x   /  AST  18  /  ALT  21  /  AlkPhos  64            Urine Studies:  Urinalysis Basic - ( 22 Sep 2019 13:41 )    Color: Light Yellow / Appearance: Slightly Turbid / S.006 / pH: x  Gluc: x / Ketone: Negative  / Bili: Negative / Urobili: <2 mg/dL   Blood: x / Protein: Trace / Nitrite: Positive   Leuk Esterase: Large / RBC: 4 /HPF /  /HPF   Sq Epi: x / Non Sq Epi: 11 /HPF / Bacteria: Moderate            RADIOLOGY & ADDITIONAL STUDIES: Sig: Take 5 mg by mouth 3 (three) times a day as needed    oxyCODONE-acetaminophen (PERCOCET) 5-325 mg per tablet   No No   Sig: Take 1 tablet by mouth every 8 (eight) hours as needed for moderate pain for up to 8 dosesMax Daily Amount: 3 tablets   Patient not taking: Reported on 12/23/2021    pantoprazole (PROTONIX) 40 mg tablet   Yes No   tamsulosin (FLOMAX) 0 4 mg   No No   Sig: Take 1 capsule (0 4 mg total) by mouth daily with dinner   Patient not taking: Reported on 12/23/2021       Facility-Administered Medications: None       Past Medical History:   Diagnosis Date    Gastroparesis     Migraine     Pseudotumor cerebri     Renal disorder     kidney stones       Past Surgical History:   Procedure Laterality Date    CARPAL TUNNEL RELEASE Right     FL RETROGRADE PYELOGRAM  6/26/2021    FL RETROGRADE PYELOGRAM  7/10/2021    VT CYSTO/URETERO W/LITHOTRIPSY &INDWELL STENT INSRT Left 6/26/2021    Procedure: CYSTOSCOPY URETEROSCOPY WITH , RETROGRADE PYELOGRAM AND INSERTION STENT URETERAL;  Surgeon: Tyler Santos MD;  Location: [address] [place] [address];  Service: Urology    VT CYSTO/URETERO W/LITHOTRIPSY &INDWELL STENT INSRT Left 7/10/2021    Procedure: CYSTOSCOPY, URETEROSCOPY, STONE MANIPULATION WITH BASKET EXTRACTION, RETROGRADE PYELOGRAM AND INSERTION STENT URETERAL;  Surgeon: Tyler Santos MD;  Location: [address] [place] [address];  Service: Urology       History reviewed  No pertinent family history  I have reviewed and agree with the history as documented  E-Cigarette/Vaping    E-Cigarette Use Never User      E-Cigarette/Vaping Substances    Nicotine No     THC No     CBD No     Flavoring No     Other No     Unknown No      Social History     Tobacco Use    Smoking status: Never Smoker    Smokeless tobacco: Never Used   Vaping Use    Vaping Use: Never used   Substance Use Topics    Alcohol use: Never    Drug use: Never       Review of Systems   Constitutional: Negative for fever     HENT: Negative for congestion, rhinorrhea and sore throat  Respiratory: Negative for shortness of breath  Cardiovascular: Negative for chest pain  Gastrointestinal: Positive for abdominal pain and nausea  Negative for diarrhea and vomiting  Genitourinary: Negative for dysuria and flank pain  Musculoskeletal: Negative for back pain and neck pain  Skin: Negative for rash  Neurological: Negative for weakness, numbness and headaches  All other systems reviewed and are negative  Physical Exam  Physical Exam  Constitutional:       Appearance: She is well-developed  HENT:      Head: Normocephalic and atraumatic  Nose: Nose normal    Eyes:      Conjunctiva/sclera: Conjunctivae normal    Cardiovascular:      Rate and Rhythm: Normal rate  Pulmonary:      Effort: Pulmonary effort is normal    Abdominal:      Palpations: Abdomen is soft  Tenderness: There is abdominal tenderness in the right lower quadrant  Negative signs include Horn's sign and Rovsing's sign  Musculoskeletal:         General: Normal range of motion  Cervical back: Normal range of motion  Skin:     General: Skin is warm  Capillary Refill: Capillary refill takes less than 2 seconds  Neurological:      Mental Status: She is alert and oriented to person, place, and time           Vital Signs  ED Triage Vitals   Temperature Pulse Respirations Blood Pressure SpO2   05/05/22 1753 05/05/22 1753 05/05/22 1753 05/05/22 1753 05/05/22 1753   98 3 °F (36 8 °C) 96 20 133/87 96 %      Temp Source Heart Rate Source Patient Position - Orthostatic VS BP Location FiO2 (%)   05/05/22 1753 05/05/22 1753 05/06/22 0513 05/06/22 0513 --   Oral Monitor Lying Right arm       Pain Score       05/05/22 1753       6           Vitals:    05/05/22 1753 05/06/22 0513 05/06/22 0527 05/06/22 0723   BP: 133/87 111/56 139/91 119/66   Pulse: 96 79 85 81   Patient Position - Orthostatic VS:  Lying           Visual Acuity      ED Medications  Medications ketorolac (TORADOL) injection 15 mg (15 mg Intravenous Given 5/5/22 2016)   aluminum-magnesium hydroxide-simethicone (MYLANTA) oral suspension 30 mL (30 mL Oral Given 5/6/22 0241)       Diagnostic Studies  Results Reviewed     Procedure Component Value Units Date/Time    Basic metabolic panel [[id number]]  (Abnormal) Collected: 05/06/22 0518    Lab Status: Final result Specimen: Blood from Arm, Left Updated: 05/06/22 0605     Sodium 139 mmol/L      Potassium 4 3 mmol/L      Chloride 103 mmol/L      CO2 26 mmol/L      ANION GAP 10 mmol/L      BUN 24 mg/dL      Creatinine 0 70 mg/dL      Glucose 91 mg/dL      Calcium 8 2 mg/dL      eGFR 109 ml/min/1 73sq m     Narrative:      [place] guidelines for Chronic Kidney Disease (CKD):     Stage 1 with normal or high GFR (GFR > 90 mL/min/1 73 square meters)    Stage 2 Mild CKD (GFR = 60-89 mL/min/1 73 square meters)    Stage 3A Moderate CKD (GFR = 45-59 mL/min/1 73 square meters)    Stage 3B Moderate CKD (GFR = 30-44 mL/min/1 73 square meters)    Stage 4 Severe CKD (GFR = 15-29 mL/min/1 73 square meters)    Stage 5 End Stage CKD (GFR <15 mL/min/1 73 square meters)  Note: GFR calculation is accurate only with a steady state creatinine    Hemoglobin A1C w/ EAG Estimation [[id number]]  (Abnormal) Collected: 05/05/22 1948    Lab Status: Final result Specimen: Blood from Arm, Left Updated: 05/06/22 0545     Hemoglobin A1C 6 2 %       mg/dl     CBC and differential [[id number]]  (Abnormal) Collected: 05/06/22 0518    Lab Status: Final result Specimen: Blood from Arm, Left Updated: 05/06/22 0541     WBC 14 30 Thousand/uL      RBC 4 49 Million/uL      Hemoglobin 12 7 g/dL      Hematocrit 39 0 %      MCV 87 fL      MCH 28 3 pg      MCHC 32 6 g/dL      RDW 14 0 %      MPV 9 8 fL      Platelets 440 Thousands/uL      nRBC 0 /100 WBCs      Neutrophils Relative 51 %      Immat GRANS % 2 %      Lymphocytes Relative 37 %      Monocytes Relative 7 % Eosinophils Relative 2 %      Basophils Relative 1 %      Neutrophils Absolute 7 47 Thousands/µL      Immature Grans Absolute 0 25 Thousand/uL      Lymphocytes Absolute 5 27 Thousands/µL      Monocytes Absolute 0 96 Thousand/µL      Eosinophils Absolute 0 24 Thousand/µL      Basophils Absolute 0 11 Thousands/µL     Urine Microscopic [[id number]]  (Normal) Collected: 05/05/22 2001    Lab Status: Final result Specimen: Urine, Clean Catch Updated: 05/05/22 2108     RBC, UA 2-4 /hpf      WBC, UA None Seen /hpf      Epithelial Cells Occasional /hpf      Bacteria, UA Occasional /hpf     [place] draw [[id number]] Collected: 05/05/22 1948    Lab Status: Final result Specimen: Blood from Arm, Left Updated: 05/05/22 2102    Narrative: The following orders were created for panel order [place] draw  Procedure                               Abnormality         Status                     ---------                               -----------         ------                     Jess Ireland Top on QAHQ[[id number]]                           Final result               Gold top on DLML[[id number]]                                 Final result               Green / Black tube on LKFX[[id number]]                       Final result                 Please view results for these tests on the individual orders  CBC and differential [[id number]]  (Abnormal) Collected: 05/05/22 1948    Lab Status: Final result Specimen: Blood from Arm, Left Updated: 05/05/22 2032     WBC 19 28 Thousand/uL      RBC 4 96 Million/uL      Hemoglobin 14 0 g/dL      Hematocrit 43 2 %      MCV 87 fL      MCH 28 2 pg      MCHC 32 4 g/dL      RDW 14 1 %      MPV 9 7 fL      Platelets 703 Thousands/uL     Narrative: This is an appended report  These results have been appended to a previously verified report      Manual Differential(PHLEBS Do Not Order) [[id number]]  (Abnormal) Collected: 05/05/22 1948    Lab Status: Final result Specimen: Blood from Arm, Left Updated: 05/05/22 2032     Segmented % 47 %      Bands % 1 %      Lymphocytes % 42 %      Monocytes % 7 %      Eosinophils, % 0 %      Basophils % 0 %      Myelocytes % 1 %      Atypical Lymphocytes % 2 %      Absolute Neutrophils 9 25 Thousand/uL      Lymphocytes Absolute 8 10 Thousand/uL      Monocytes Absolute 1 35 Thousand/uL      Eosinophils Absolute 0 00 Thousand/uL      Basophils Absolute 0 00 Thousand/uL      Total Counted --     RBC Morphology Normal     Platelet Estimate Adequate    Lipase [[id number]]  (Normal) Collected: 05/05/22 1948    Lab Status: Final result Specimen: Blood from Arm, Left Updated: 05/05/22 2026     Lipase 31 u/L     Comprehensive metabolic panel [[id number]] Collected: 05/05/22 1948    Lab Status: Final result Specimen: Blood from Arm, Left Updated: 05/05/22 2026     Sodium 140 mmol/L      Potassium 4 2 mmol/L      Chloride 105 mmol/L      CO2 24 mmol/L      ANION GAP 11 mmol/L      BUN 22 mg/dL      Creatinine 0 73 mg/dL      Glucose 118 mg/dL      Calcium 8 6 mg/dL      AST 21 U/L      ALT 27 U/L      Alkaline Phosphatase 71 U/L      Total Protein 7 0 g/dL      Albumin 4 0 g/dL      Total Bilirubin 0 27 mg/dL      eGFR 104 ml/min/1 73sq m     Narrative:      [place] guidelines for Chronic Kidney Disease (CKD):     Stage 1 with normal or high GFR (GFR > 90 mL/min/1 73 square meters)    Stage 2 Mild CKD (GFR = 60-89 mL/min/1 73 square meters)    Stage 3A Moderate CKD (GFR = 45-59 mL/min/1 73 square meters)    Stage 3B Moderate CKD (GFR = 30-44 mL/min/1 73 square meters)    Stage 4 Severe CKD (GFR = 15-29 mL/min/1 73 square meters)    Stage 5 End Stage CKD (GFR <15 mL/min/1 73 square meters)  Note: GFR calculation is accurate only with a steady state creatinine    POCT pregnancy, urine [[id number]]  (Normal) Resulted: 05/05/22 2005    Lab Status: Final result Updated: 05/05/22 2005     EXT PREG TEST UR (Ref: Negative) negative     Control valid    Urine Macroscopic, POC [[id number]]  (Abnormal) Collected: 05/05/22 2001    Lab Status: Final result Specimen: Urine Updated: 05/05/22 2003     Color, UA Yellow     Clarity, UA Clear     pH, UA 5 5     Leukocytes, UA Negative     Nitrite, UA Negative     Protein, UA Negative mg/dl      Glucose, UA Negative mg/dl      Ketones, UA Negative mg/dl      Urobilinogen, UA 0 2 E U /dl      Bilirubin, UA Negative     Blood, UA Trace     Specific Gravity, UA >=1 030    Narrative:      CLINITEK RESULT                 CT abdomen pelvis wo contrast   Final Result by Angela Vera MD (05/05 2230)      Normal appendix  No evidence of bowel obstruction, colitis or diverticulitis  Workstation performed: [id number]                    Procedures  Procedures         ED Course  ED Course as of 05/11/22 1151   Thu May 05, 2022   2012 Blood Pressure: 133/87   2012 Temperature: 98 3 °F (36 8 °C)   2012 Pulse: 96   2012 Respirations: 20   2012 SpO2: 96 %   2012 Per radiology, contrast shortage, requesting all abdominal scans done without contrast at this time unless otherwise specified by radiologist  Will switch abdomen to CT abd/pelvis without contrast     2012 WBC(!): 19 28   2012 Hemoglobin: 14 0   2012 Leukocytes, UA: Negative   2012 Nitrite, UA: Negative   2012 Blood, UA(!): Trace   2012 PREGNANCY TEST URINE: negative   2027 Creatinine: 0 73   2027 TOTAL BILIRUBIN: 0 27   2028 Per radiology, contrast shortage, requesting all abdominal scans done without contrast at this time unless otherwise specified by radiologist  Will switch abdomen to CT abd/pelvis without contrast                                               [facility]     Patient stable during ER stay  Patient signed out to [place], [place]  Awaiting CT and final disposition      Disposition  Final diagnoses:   RLQ abdominal pain   Leukocytosis     Time reflects when diagnosis was documented in both MDM as applicable and the Disposition within this note     Time User Action Codes Description Comment    5/6/2022 12:48 AM Armond Ye Add [R10 31] RLQ abdominal pain     5/6/2022  1:37 AM Armond Christyyodit Add [E08 548] Leukocytosis       ED Disposition     ED Disposition   Admit    Condition   Stable    Date/Time   Fri May 6, 2022  1:37 AM    Comment   Case was discussed with SLIM/surgery and the patient's admission status was agreed to be Admission Status: observation status to the service of Dr Karley Treviño              Follow-up Information     Follow up With Specialties Details Why Contact Noemí Oliva, DO Family Medicine Follow up in 1 week(s) as needed [place]  [address] [facility]  [phone]            Discharge Medication List as of 5/6/2022  1:04 PM      START taking these medications    Details   aluminum-magnesium hydroxide-simethicone (MYLANTA) 200-200-20 mg/5 mL suspension Take 30 mL by mouth every 4 (four) hours as needed for indigestion or heartburn, Starting Fri 5/6/2022, Normal         CONTINUE these medications which have NOT CHANGED    Details   guaifenesin-codeine (GUAIFENESIN AC) 100-10 MG/5ML liquid TAKE 1-2 TSP BY  MOUTH EVERY 4-6 HOURS AS NEEDED FOR COUGH, Normal      metoclopramide (REGLAN) 5 mg tablet Take 5 mg by mouth 3 (three) times a day as needed , Starting Sun 11/25/2018, Historical Med      pantoprazole (PROTONIX) 40 mg tablet Starting Mon 12/20/2021, Historical Med         STOP taking these medications       ALPRAZolam (XANAX) 0 25 mg tablet Comments:   Reason for Stopping:         desloratadine (CLARINEX REDITAB) 5 MG disintegrating tablet Comments:   Reason for Stopping:         fluticasone (FLONASE SENSIMIST) 27 5 MCG/SPRAY nasal spray Comments:   Reason for Stopping:         lansoprazole (PREVACID SOLUTAB) 30 mg disintegrating tablet Comments:   Reason for Stopping:         Omeprazole (PRILOSEC PO) Comments:   Reason for Stopping:         oxyCODONE-acetaminophen (PERCOCET) 5-325 mg per tablet Comments:   Reason for Stopping:         tamsulosin (FLOMAX) 0 4 mg Comments:   Reason for Stopping:               Outpatient Discharge Orders   Discharge Diet     Activity as tolerated       PDMP Review       Value Time User    PDMP Reviewed  Yes 12/23/2021  7:49 PM Dave Srinivasan, [facility] Provider  Electronically Signed by           Dean Dai PA-C  05/11/22 3828

## 2022-05-06 NOTE — ASSESSMENT & PLAN NOTE
· Patient has history of idiopathic gastroparesis  · Current presentation likely secondary to gastroparesis versus gastroenteritis versus possible constipation  · Per patient, currently gastro paresis appears to be controlled with mild nausea and occasional vomiting    Plan  · Takes Reglan mg t i d  P r n  Home will continue  · Patient takes Protonix 40 mg morning and omeprazole 20 mg in the evening    Will continue

## 2022-05-06 NOTE — PLAN OF CARE
Problem: GASTROINTESTINAL - ADULT  Goal: Minimal or absence of nausea and/or vomiting  Description: INTERVENTIONS:  - Administer IV fluids if ordered to ensure adequate hydration  - Maintain NPO status until nausea and vomiting are resolved  - Nasogastric tube if ordered  - Administer ordered antiemetic medications as needed  - Provide nonpharmacologic comfort measures as appropriate  - Advance diet as tolerated, if ordered  - Consider nutrition services referral to assist patient with adequate nutrition and appropriate food choices  Outcome: Progressing  Goal: Maintains or returns to baseline bowel function  Description: INTERVENTIONS:  - Assess bowel function  - Encourage oral fluids to ensure adequate hydration  - Administer IV fluids if ordered to ensure adequate hydration  - Administer ordered medications as needed  - Encourage mobilization and activity  - Consider nutritional services referral to assist patient with adequate nutrition and appropriate food choices  Outcome: Progressing  Goal: Maintains adequate nutritional intake  Description: INTERVENTIONS:  - Monitor percentage of each meal consumed  - Identify factors contributing to decreased intake, treat as appropriate  - Assist with meals as needed  - Monitor I&O, weight, and lab values if indicated  - Obtain nutrition services referral as needed  Outcome: Progressing  Goal: Establish and maintain optimal ostomy function  Description: INTERVENTIONS:  - Assess bowel function  - Encourage oral fluids to ensure adequate hydration  - Administer IV fluids if ordered to ensure adequate hydration   - Administer ordered medications as needed  - Encourage mobilization and activity  - Nutrition services referral to assist patient with appropriate food choices  - Assess stoma site  - Consider wound care consult   Outcome: Progressing  Goal: Oral mucous membranes remain intact  Description: INTERVENTIONS  - Assess oral mucosa and hygiene practices  - Implement preventative oral hygiene regimen  - Implement oral medicated treatments as ordered  - Initiate Nutrition services referral as needed  Outcome: Progressing     Problem: Nutrition/Hydration-ADULT  Goal: Nutrient/Hydration intake appropriate for improving, restoring or maintaining nutritional needs  Description: Monitor and assess patient's nutrition/hydration status for malnutrition  Collaborate with interdisciplinary team and initiate plan and interventions as ordered  Monitor patient's weight and dietary intake as ordered or per policy  Utilize nutrition screening tool and intervene as necessary  Determine patient's food preferences and provide high-protein, high-caloric foods as appropriate       INTERVENTIONS:  - Monitor oral intake, urinary output, labs, and treatment plans  - Assess nutrition and hydration status and recommend course of action  - Evaluate amount of meals eaten  - Assist patient with eating if necessary   - Allow adequate time for meals  - Recommend/ encourage appropriate diets, oral nutritional supplements, and vitamin/mineral supplements  - Order, calculate, and assess calorie counts as needed  - Recommend, monitor, and adjust tube feedings and TPN/PPN based on assessed needs  - Assess need for intravenous fluids  - Provide specific nutrition/hydration education as appropriate  - Include patient/family/caregiver in decisions related to nutrition  Outcome: Progressing  Goal: Maintains or returns to baseline bowel function  Description: INTERVENTIONS:  Outcome: Progressing

## 2022-05-06 NOTE — ASSESSMENT & PLAN NOTE
Patient has history of gastroparesis  Unclear etiology of gastroparesis  Current presentation likely secondary to gastroparesis versus gastroenteritis versus possible constipation  · Takes Reglan mg t i d  P r n  Home will continue  · Patient takes Protonix 40 mg morning and omeprazole 20 mg in the evening    Will continue  · Will start diet at clear liquids can advance as tolerated

## 2022-05-06 NOTE — ASSESSMENT & PLAN NOTE
· Patient has history of migraines  Currently does not have any headaches  · Could consider abdominal migraines is an etiology for her presentation    · Continue to monitor

## 2022-05-06 NOTE — DISCHARGE SUMMARY
[facility]  Discharge- Viviana Mt 1980, 46 y o  female MRN: [id number]  Unit/Bed#: S -01 Encounter: [id number]  Primary Care Provider: Janet Vazquez DO   Date and time admitted to hospital: 5/5/2022  7:34 PM    * Abdominal pain  Assessment & Plan  · Patient has been having right lower quadrant abdominal pain x 2 days  · Physical examination shows active bowel sounds, tenderness in right lower quadrant with mild diffuse distention  No rebound tenderness  CT of the abdomen wo contrast does not reveal any abnormalities  · Unclear etiology of abdominal pain at this point  Possible abdominal migraines  · Pain improved following IV hydration and bowel rest  · Patient tolerated regular diet    Plan   · Continue patient Reglan p r n  · Mylanta p r n   · Outpatient follow-up with GI    Leukocytosis  Assessment & Plan  · Unclear etiology of patient's leukocytosis  Patient seems to have elevated leukocytosis chronically between 12-14  · Patient also states that she has had a steroid injection for her knees, however this is less likely to cause her leukocytosis  · Continue outpatient monitoring  Migraines  Assessment & Plan  · Patient has history of migraines  Currently does not have any headaches  · Could consider abdominal migraines is an etiology for her presentation  Gastroparesis  Assessment & Plan  · Patient has history of idiopathic gastroparesis  · Current presentation likely secondary to gastroparesis versus gastroenteritis versus possible constipation  · Per patient, currently gastro paresis appears to be controlled with mild nausea and occasional vomiting    Plan  · Takes Reglan mg t i d  P r n  Home will continue  · Patient takes Protonix 40 mg morning and omeprazole 20 mg in the evening    Will continue        Medical Problems             Resolved Problems  Date Reviewed: 7/10/2021    None              Discharging Resident: Chun Rubio MD  Discharging Attending: No att  providers found  PCP: Ezequiel Henry DO  Admission Date:   Admission Orders (From admission, onward)     Ordered        05/06/22 0138  Place in Observation  Once                      Discharge Date: 05/06/22    Consultations During Hospital Stay:  · None     Procedures Performed:   · None     Significant Findings / Test Results:   · CT abdomen/pelvis without contrast Normal appendix   No evidence of bowel obstruction, colitis or diverticulitis  Incidental Findings:   · None     Test Results Pending at Discharge (will require follow up): · None     Outpatient Tests Requested:  · None    Complications:  None    Reason for Admission:  Abdominal pain    Hospital Course:   Coby Almonte is a 46 y o  female patient who originally presented to the hospital on 5/5/2022 due to right lower quadrant pain  She has a history of idiopathic gastroparesis, past history of nephrolithiasis, migraine headaches, and or CP  On admission, she had stable vital signs  CT abdomen and pelvis without contrast showed no evidence of appendicitis or other acute pathologies  CMP was within normal limits, CBC was notable for white blood cell count of 19 2, with no evidence of bandemia  She has a chronic leukocytosis  Urinalysis was negative  Urine hCG was negative  She was managed with IV fluids, Mylanta, clear liquid diet, and continuation of antiemetics and PPIs  Next morning, patient's symptoms improved, was no longer complaining of severe abdominal pain  She tolerated a regular meal       Please see above list of diagnoses and related plan for additional information  Condition at Discharge: stable    Discharge Day Visit / Exam:   Subjective:  Feeling better, abdominal pain is now improved to 2/10  Has good appetite, and would like to try a regular meal   No nausea/vomiting other than her baseline  Denies dysuria, urgency, frequency    No altered bowel movements, patient had a bowel movement this morning  Vitals: Blood Pressure: 119/66 (05/06/22 0723)  Pulse: 81 (05/06/22 0723)  Temperature: 97 8 °F (36 6 °C) (05/06/22 0723)  Temp Source: Oral (05/05/22 1753)  Respirations: 16 (05/06/22 0723)  SpO2: 94 % (05/06/22 0723)     Exam:   Physical Exam  Vitals and nursing note reviewed  Constitutional:       General: She is not in acute distress  Appearance: She is not ill-appearing or toxic-appearing  Comments: BMI 49 69   HENT:      Head: Normocephalic  Nose: Nose normal  No congestion  Mouth/Throat:      Mouth: Mucous membranes are moist    Eyes:      Pupils: Pupils are equal, round, and reactive to light  Cardiovascular:      Rate and Rhythm: Normal rate and regular rhythm  Pulses: Normal pulses  Heart sounds: Normal heart sounds  No murmur heard  Pulmonary:      Effort: Pulmonary effort is normal       Breath sounds: Normal breath sounds  No wheezing or rales  Abdominal:      General: Abdomen is flat  Bowel sounds are normal  There is no distension  Palpations: Abdomen is soft  Tenderness: There is no abdominal tenderness  Musculoskeletal:      Cervical back: Normal range of motion  Right lower leg: No edema  Left lower leg: No edema  Skin:     General: Skin is warm  Capillary Refill: Capillary refill takes less than 2 seconds  Neurological:      General: No focal deficit present  Mental Status: She is alert  Mental status is at baseline  Psychiatric:         Mood and Affect: Mood normal           Discussion with Family: Patient declined call to   Discharge instructions/Information to patient and family:   See after visit summary for information provided to patient and family  Provisions for Follow-Up Care:  See after visit summary for information related to follow-up care and any pertinent home health orders         Disposition:   Home    Planned Readmission: none     Discharge Medications:  See after visit summary for reconciled discharge medications provided to patient and/or family        **Please Note: This note may have been constructed using a voice recognition system**

## 2022-05-06 NOTE — DISCHARGE INSTR - AVS FIRST PAGE
Dear Ivette Mace,     It was our pleasure to care for you here at [facility]  It is our hope that we were always able to exceed the expected standards for your care during your stay  You were hospitalized due to abdominal pain  You were cared for on the [facility] 2nd floor by Pam Olmstead MD under the service of Levi Thibodeaux MD with the [facility] who covers for your primary care physician (PCP), Bernice Mondragon DO, while you were hospitalized  If you have any questions or concerns related to this hospitalization, you may contact us at [phone]  For follow up as well as any medication refills, we recommend that you follow up with your primary care physician  A registered nurse will reach out to you by phone within a few days after your discharge to answer any additional questions that you may have after going home  However, at this time we provide for you here, the most important instructions / recommendations at discharge:     Notable Medication Adjustments -   Home medications were adjusted, continue all medications as you did prior to admission  Use Mylanta every 4 hours as needed for heartburn/indigestion  Testing Required after Discharge -   None  Important follow up information -   Follow-up with your primary care physician when needed  Other Instructions -   None  Please review this entire after visit summary as additional general instructions including medication list, appointments, activity, diet, any pertinent wound care, and other additional recommendations from your care team that may be provided for you        Sincerely,     Pam Olmstead MD

## 2022-05-06 NOTE — CONSULTS
Consultation - General Surgery   Coby Almonte 46 y o  female MRN: [id number]  Unit/Bed#: [facility] Encounter: [id number]    Assessment/Plan     Assessment:  46 F with PMHx gastroparesis, Ovarian cysts, and L renal calculi p/w RLQ pain radiating to flank x36 hrs  AVSS, room air  Abdomen soft, minimally tender over R hip toward flank, no Rovsing obturator or psoas sign     UA + for trace blood only     WBC 19,000  remainder of labs wnl      CT abdomen/pelvis: grossly unremarkable  Measured appendix at 5mm without evidence of inflammation on my assessment of the scan  Plan:  -No acute surgical intervention  -diet as tolerated  -recommend observing off antibiotics  -differential at present includes occult ureterolithiasis vs possible ovarian cyst vs mesenteric adenitis vs structural/muskuloskeletial etiologies  No objection to overnight observation on medical service to trend CBC  -OOB, ambulate  -pain control  -DVT PPX      History of Present Illness   History, ROS and PFSH unobtainable from any source due to n/a   HPI:  Coby Almonte is a 46 y o  female who presents with A vague history of persistent tRight sided abdominal pain radiating to her flank that began the morning of 5/4  She notes no fevers, chills, nausea or vomiting  shje has had a consistent appetite and eaten normally during this period  She occasionally notes constipation pain in this area but has recently been regularly moving her bowels  At present she also denies urinary complaints but does have a Hx of left renal and ureteral stones  She denies any prior abdominal surgical history, and does note a remote history of right sided ovarian cyst with possible rupture       Inpatient consult to Acute Care Surgery  Consult performed by: Michele De La Garza MD  Consult ordered by: Jr Duran PA-C          Review of Systems   Constitutional: Negative for chills and fever  Gastrointestinal: Positive for abdominal pain   Negative for abdominal distention, blood in stool, nausea and vomiting  Genitourinary: Positive for flank pain  Negative for dysuria  Musculoskeletal: Positive for arthralgias, back pain and gait problem  All other systems reviewed and are negative  Historical Information   Past Medical History:   Diagnosis Date    Gastroparesis     Migraine     Pseudotumor cerebri     Renal disorder     kidney stones     Past Surgical History:   Procedure Laterality Date    CARPAL TUNNEL RELEASE Right     FL RETROGRADE PYELOGRAM  6/26/2021    FL RETROGRADE PYELOGRAM  7/10/2021    WI CYSTO/URETERO W/LITHOTRIPSY &INDWELL STENT INSRT Left 6/26/2021    Procedure: CYSTOSCOPY URETEROSCOPY WITH , RETROGRADE PYELOGRAM AND INSERTION STENT URETERAL;  Surgeon: Julio Whitaker MD;  Location: [facility];  Service: Urology    WI CYSTO/URETERO W/LITHOTRIPSY &INDWELL STENT INSRT Left 7/10/2021    Procedure: CYSTOSCOPY, URETEROSCOPY, STONE MANIPULATION WITH BASKET EXTRACTION, RETROGRADE PYELOGRAM AND INSERTION STENT URETERAL;  Surgeon: Julio Whitaker MD;  Location: [facility];  Service: Urology     Social History   Social History     Substance and Sexual Activity   Alcohol Use Never     Social History     Substance and Sexual Activity   Drug Use Never     E-Cigarette/Vaping    E-Cigarette Use Never User      E-Cigarette/Vaping Substances     Social History     Tobacco Use   Smoking Status Never Smoker   Smokeless Tobacco Never Used     Family History: History reviewed  No pertinent family history  Meds/Allergies   current meds:   No current facility-administered medications for this encounter  Allergies   Allergen Reactions    Coconut Oil - Food Allergy     Fentanyl Vomiting    Prednisone Rash    Wound Dressing Adhesive Rash     Adhesive tape per pt          Objective   First Vitals:   Blood Pressure: 133/87 (05/05/22 1753)  Pulse: 96 (05/05/22 1753)  Temperature: 98 3 °F (36 8 °C) (05/05/22 1753)  Temp Source: Oral (05/05/22 1753)  Respirations: 20 (05/05/22 1753)  SpO2: 96 % (05/05/22 1753)    Current Vitals:   Blood Pressure: 133/87 (05/05/22 1753)  Pulse: 96 (05/05/22 1753)  Temperature: 98 3 °F (36 8 °C) (05/05/22 1753)  Temp Source: Oral (05/05/22 1753)  Respirations: 20 (05/05/22 1753)  SpO2: 96 % (05/05/22 1753)    No intake or output data in the 24 hours ending 05/06/22 0029    Invasive Devices  Report    Peripheral Intravenous Line            Peripheral IV 05/05/22 Left Antecubital <1 day                Physical Exam  Vitals reviewed  Constitutional:       General: She is not in acute distress  Appearance: She is obese  She is not ill-appearing or toxic-appearing  HENT:      Head: Normocephalic and atraumatic  Nose: Nose normal    Eyes:      General: No scleral icterus  Pupils: Pupils are equal, round, and reactive to light  Cardiovascular:      Rate and Rhythm: Normal rate and regular rhythm  Pulmonary:      Effort: Pulmonary effort is normal  No respiratory distress  Abdominal:      General: Abdomen is protuberant  Bowel sounds are normal  There is no distension  Palpations: Abdomen is soft  Tenderness: There is no abdominal tenderness  There is no right CVA tenderness  Musculoskeletal:      Cervical back: No rigidity  Right hip: Tenderness present  Lymphadenopathy:      Cervical: No cervical adenopathy  Skin:     General: Skin is warm and dry  Capillary Refill: Capillary refill takes 2 to 3 seconds  Coloration: Skin is not jaundiced  Neurological:      Mental Status: She is alert and oriented to person, place, and time  Mental status is at baseline  Psychiatric:         Mood and Affect: Mood normal          Behavior: Behavior normal          Lab Results:   I have personally reviewed pertinent lab results    , CBC:   Lab Results   Component Value Date    WBC 19 28 (H) 05/05/2022    HGB 14 0 05/05/2022    HCT 43 2 05/05/2022    MCV 87 05/05/2022     05/05/2022    MCH 28 2 05/05/2022    MCHC 32 4 05/05/2022    RDW 14 1 05/05/2022    MPV 9 7 05/05/2022   , CMP:   Lab Results   Component Value Date    SODIUM 140 05/05/2022    K 4 2 05/05/2022     05/05/2022    CO2 24 05/05/2022    BUN 22 05/05/2022    CREATININE 0 73 05/05/2022    CALCIUM 8 6 05/05/2022    AST 21 05/05/2022    ALT 27 05/05/2022    ALKPHOS 71 05/05/2022    EGFR 104 05/05/2022     Imaging: I have personally reviewed pertinent reports  and I have personally reviewed pertinent films in PACS  EKG, Pathology, and Other Studies: I have personally reviewed pertinent reports  and I have personally reviewed pertinent films in PACS    CT abdomen pelvis wo contrast    Result Date: 5/5/2022   Impression: Normal appendix  No evidence of bowel obstruction, colitis or diverticulitis   Workstation performed: [id number]

## 2022-05-06 NOTE — H&P
[facility]  H&P- Dasha Kang 1980, 46 y o  female MRN: [id number]  Unit/Bed#: S -01 Encounter: [id number]  Primary Care Provider: Alejandra Gallagher DO   Date and time admitted to hospital: 5/5/2022  7:34 PM    * Abdominal pain  Assessment & Plan  Patient has been having right lower quadrant abdominal pain since this morning  Patient has not been able to eat anything  However patient has been able to hydrate herself  Physical examination shows active bowel sounds, tenderness in right lower quadrant with mild diffuse distention  No rebound tenderness  CT of the abdomen does not reveal any abnormalities  Unclear etiology of abdominal pain at this point  · IV fluids  · Continue monitor  · Continue patient Reglan p r n  · Mylanta p r n   · Trailed one dose of bentyl as an antispasmodic for relief  · Surgery on board, no surgical intervention at this time  Leukocytosis  Assessment & Plan  Unclear etiology of patient's leukocytosis  Patient seems to have elevated leukocytosis chronically between 12-14  Patient also states that she has had a steroid injection for her knees, however this is less likely to cause her leukocytosis  · Will give IV fluids  · Monitor with CBC tomorrow a m  Gastroparesis  Assessment & Plan  Patient has history of gastroparesis  Unclear etiology of gastroparesis  Current presentation likely secondary to gastroparesis versus gastroenteritis versus possible constipation  · Takes Reglan mg t i d  P r n  Home will continue  · Patient takes Protonix 40 mg morning and omeprazole 20 mg in the evening  Will continue  · Will start diet at clear liquids can advance as tolerated      Migraines  Assessment & Plan  · Patient has history of migraines  Currently does not have any headaches  · Could consider abdominal migraines is an etiology for her presentation    · Continue to monitor    VTE Pharmacologic Prophylaxis:   Moderate Risk (Score 3-4) - Pharmacological DVT Prophylaxis Ordered: enoxaparin (Lovenox)  Code Status: Level 1 - Full Code   Discussion with family: Patient declined call to   Anticipated Length of Stay: Patient will be admitted on an observation basis with an anticipated length of stay of less than 2 midnights secondary to Gastroparesis with leukocytosis  Chief Complaint:  Gastroparesis    History of Present Illness:  Louann Gonzalez is a 46 y o  female with a PMH of gastroparesis, GERD, JAYNA, pseudotumor cerebri and osteoarthritis of the knees who presents with worsening right lower quadrant abdominal pain since this morning  Patient does have a history of nephrolithiasis and ureteral calculus that was treated roughly 6 months ago  Patient states that this pain is different, and feels more consistent with her typical gastroparesis pain  Patient denies any recent fevers, nausea, vomiting or diarrhea  Patient states she has not been constipated, and has had regular bowel movements  Patient denies any recent changes to her diet are environment  Patient denies any urinary symptoms  Patient does state that she has had steroid injections in her knees for OA recently  Patient does follow-up GI as an outpatient every 6 months  Review of Systems:  Review of Systems   Constitutional: Negative for activity change, appetite change and chills  HENT: Negative for rhinorrhea, sinus pain and sore throat  Eyes: Negative for visual disturbance  Respiratory: Negative for cough, chest tightness and shortness of breath  Cardiovascular: Negative for chest pain and palpitations  Gastrointestinal: Positive for abdominal pain (Right lower quadrant) and nausea  Negative for abdominal distention  Endocrine: Negative for polyphagia  Genitourinary: Negative for dysuria, enuresis, frequency and menstrual problem  Musculoskeletal: Negative for joint swelling  Skin: Negative for pallor and rash     Neurological: Negative for dizziness, facial asymmetry, light-headedness and headaches  Psychiatric/Behavioral: Negative  Past Medical and Surgical History:   Past Medical History:   Diagnosis Date    Gastroparesis     Migraine     Pseudotumor cerebri     Renal disorder     kidney stones       Past Surgical History:   Procedure Laterality Date    CARPAL TUNNEL RELEASE Right     FL RETROGRADE PYELOGRAM  6/26/2021    FL RETROGRADE PYELOGRAM  7/10/2021    [place] CYSTO/URETERO W/LITHOTRIPSY &INDWELL STENT INSRT Left 6/26/2021    Procedure: CYSTOSCOPY URETEROSCOPY WITH , RETROGRADE PYELOGRAM AND INSERTION STENT URETERAL;  Surgeon: Mely Paz MD;  Location: [facility];  Service: Urology    IL CYSTO/URETERO W/LITHOTRIPSY &INDWELL STENT INSRT Left 7/10/2021    Procedure: CYSTOSCOPY, URETEROSCOPY, STONE MANIPULATION WITH BASKET EXTRACTION, RETROGRADE PYELOGRAM AND INSERTION STENT URETERAL;  Surgeon: Mely Paz MD;  Location: [facility];  Service: Urology       Meds/Allergies:  Prior to Admission medications    Medication Sig Start Date End Date Taking?  Authorizing Provider   ALPRAZolam Rannie Bayamon) 0 25 mg tablet Take 1 tablet (0 25 mg total) by mouth daily at bedtime as needed (second line bladder spasm)  Patient not taking: Reported on 12/23/2021 6/27/21   Erin Mcgee DO   desloratadine (CLARINEX REDITAB) 5 MG disintegrating tablet Take 10 mg by mouth daily   Patient not taking: Reported on 12/23/2021     Historical Provider, MD   fluticasone (FLONASE SENSIMIST) 27 5 MCG/SPRAY nasal spray 2 sprays into each nostril daily   Patient not taking: Reported on 12/23/2021     Historical Provider, MD   guaifenesin-codeine (GUAIFENESIN AC) 100-10 MG/5ML liquid TAKE 1-2 TSP BY  MOUTH EVERY 4-6 HOURS AS NEEDED FOR COUGH 12/23/21   NOE Donald   lansoprazole (PREVACID SOLUTAB) 30 mg disintegrating tablet Take 30 mg by mouth 2 (two) times a day   Patient not taking: Reported on 12/23/2021 11/9/18   Historical Provider, MD   metoclopramide (REGLAN) 5 mg tablet Take 5 mg by mouth 3 (three) times a day as needed  11/25/18   Historical Provider, MD   Omeprazole (PRILOSEC PO) omeprazole    Historical Provider, MD   oxyCODONE-acetaminophen (PERCOCET) 5-325 mg per tablet Take 1 tablet by mouth every 8 (eight) hours as needed for moderate pain for up to 8 dosesMax Daily Amount: 3 tablets  Patient not taking: Reported on 12/23/2021  7/10/21   Omi Bañuelos MD   pantoprazole (PROTONIX) 40 mg tablet  12/20/21   Historical Provider, MD   tamsulosin (FLOMAX) 0 4 mg Take 1 capsule (0 4 mg total) by mouth daily with dinner  Patient not taking: Reported on 12/23/2021 6/27/21   Nacho Poole,      I have reviewed home medications with patient personally  Allergies: Allergies   Allergen Reactions    Coconut Oil - Food Allergy     Fentanyl Vomiting    Prednisone Rash    Wound Dressing Adhesive Rash     Adhesive tape per pt  Social History:  Marital Status: /Civil Union   Occupation:   for hospice  Patient Pre-hospital Living Situation:   Patient Pre-hospital Level of Mobility:   Patient Pre-hospital Diet Restrictions:   Substance Use History:   Social History     Substance and Sexual Activity   Alcohol Use Never     Social History     Tobacco Use   Smoking Status Never Smoker   Smokeless Tobacco Never Used     Social History     Substance and Sexual Activity   Drug Use Never       Family History:  History reviewed  No pertinent family history  Physical Exam:     Vitals:   Blood Pressure: 139/91 (05/06/22 0527)  Pulse: 85 (05/06/22 0527)  Temperature: 98 1 °F (36 7 °C) (05/06/22 0527)  Temp Source: Oral (05/05/22 1753)  Respirations: 18 (05/06/22 0513)  SpO2: 95 % (05/06/22 0527)    Physical Exam  Vitals and nursing note reviewed  Constitutional:       General: She is not in acute distress  Appearance: She is well-developed  HENT:      Head: Normocephalic and atraumatic  Mouth/Throat:      Mouth: Mucous membranes are moist       Pharynx: No oropharyngeal exudate  Eyes:      Extraocular Movements: Extraocular movements intact  Conjunctiva/sclera: Conjunctivae normal       Pupils: Pupils are equal, round, and reactive to light  Cardiovascular:      Rate and Rhythm: Normal rate and regular rhythm  Pulses: Normal pulses  Heart sounds: No murmur heard  Pulmonary:      Effort: Pulmonary effort is normal  No respiratory distress  Breath sounds: Normal breath sounds  Abdominal:      General: Bowel sounds are normal  There is distension (Mild)  Palpations: Abdomen is soft  Tenderness: There is abdominal tenderness (Right lower quadrant)  There is no guarding or rebound  Musculoskeletal:         General: Normal range of motion  Cervical back: Normal range of motion and neck supple  Right lower leg: No edema  Left lower leg: No edema  Skin:     General: Skin is warm and dry  Neurological:      General: No focal deficit present  Mental Status: She is alert and oriented to person, place, and time     Psychiatric:         Mood and Affect: Mood normal          Behavior: Behavior normal         Additional Data:     Lab Results:  Results from last 7 days   Lab Units 05/05/22 1948   WBC Thousand/uL 19 28*   HEMOGLOBIN g/dL 14 0   HEMATOCRIT % 43 2   PLATELETS Thousands/uL 375   BANDS PCT % 1   LYMPHO PCT % 42   MONO PCT % 7   EOS PCT % 0     Results from last 7 days   Lab Units 05/05/22 1948   SODIUM mmol/L 140   POTASSIUM mmol/L 4 2   CHLORIDE mmol/L 105   CO2 mmol/L 24   BUN mg/dL 22   CREATININE mg/dL 0 73   ANION GAP mmol/L 11   CALCIUM mg/dL 8 6   ALBUMIN g/dL 4 0   TOTAL BILIRUBIN mg/dL 0 27   ALK PHOS U/L 71   ALT U/L 27   AST U/L 21   GLUCOSE RANDOM mg/dL 118                       Imaging: Reviewed radiology reports from this admission including: abdominal/pelvic CT  CT abdomen pelvis wo contrast   Final Result by Андрей Jones MD Igor (05/05 2230)      Normal appendix  No evidence of bowel obstruction, colitis or diverticulitis  Workstation performed: [id number]             EKG and Other Studies Reviewed on Admission:   · EKG: NSR  HR 96     ** Please Note: This note has been constructed using a voice recognition system   **

## 2024-01-25 ENCOUNTER — APPOINTMENT (EMERGENCY)
Dept: CT IMAGING | Facility: HOSPITAL | Age: 44
End: 2024-01-25
Payer: COMMERCIAL

## 2024-01-25 ENCOUNTER — HOSPITAL ENCOUNTER (EMERGENCY)
Facility: HOSPITAL | Age: 44
Discharge: HOME | End: 2024-01-25
Attending: EMERGENCY MEDICINE
Payer: COMMERCIAL

## 2024-01-25 VITALS
OXYGEN SATURATION: 94 % | SYSTOLIC BLOOD PRESSURE: 133 MMHG | WEIGHT: 237 LBS | HEIGHT: 59 IN | TEMPERATURE: 98.7 F | RESPIRATION RATE: 18 BRPM | HEART RATE: 98 BPM | BODY MASS INDEX: 47.78 KG/M2 | DIASTOLIC BLOOD PRESSURE: 83 MMHG

## 2024-01-25 DIAGNOSIS — K52.9 GASTROENTERITIS: Primary | ICD-10-CM

## 2024-01-25 LAB
ALBUMIN SERPL BCP-MCNC: 4.2 G/DL (ref 3.5–5)
ALP SERPL-CCNC: 67 U/L (ref 34–104)
ALT SERPL W P-5'-P-CCNC: 25 U/L (ref 7–52)
ANION GAP SERPL CALCULATED.3IONS-SCNC: 10 MMOL/L
AST SERPL W P-5'-P-CCNC: 21 U/L (ref 13–39)
BACTERIA UR QL AUTO: ABNORMAL /HPF
BASOPHILS # BLD AUTO: 0.04 THOUSANDS/ÂΜL (ref 0–0.1)
BASOPHILS NFR BLD AUTO: 0 % (ref 0–1)
BILIRUB SERPL-MCNC: 0.33 MG/DL (ref 0.2–1)
BILIRUB UR QL STRIP: NEGATIVE
BUN SERPL-MCNC: 13 MG/DL (ref 5–25)
CALCIUM SERPL-MCNC: 9.2 MG/DL (ref 8.4–10.2)
CHLORIDE SERPL-SCNC: 102 MMOL/L (ref 96–108)
CLARITY UR: ABNORMAL
CO2 SERPL-SCNC: 25 MMOL/L (ref 21–32)
COLOR UR: ABNORMAL
CREAT SERPL-MCNC: 0.61 MG/DL (ref 0.6–1.3)
EOSINOPHIL # BLD AUTO: 0.22 THOUSAND/ÂΜL (ref 0–0.61)
EOSINOPHIL NFR BLD AUTO: 2 % (ref 0–6)
ERYTHROCYTE [DISTWIDTH] IN BLOOD BY AUTOMATED COUNT: 13.6 % (ref 11.6–15.1)
GFR SERPL CREATININE-BSD FRML MDRD: 112 ML/MIN/1.73SQ M
GLUCOSE SERPL-MCNC: 120 MG/DL (ref 65–140)
GLUCOSE UR STRIP-MCNC: NEGATIVE MG/DL
HCT VFR BLD AUTO: 45.2 % (ref 34.8–46.1)
HGB BLD-MCNC: 14.9 G/DL (ref 11.5–15.4)
HGB UR QL STRIP.AUTO: NEGATIVE
IMM GRANULOCYTES # BLD AUTO: 0.06 THOUSAND/UL (ref 0–0.2)
IMM GRANULOCYTES NFR BLD AUTO: 1 % (ref 0–2)
KETONES UR STRIP-MCNC: NEGATIVE MG/DL
LEUKOCYTE ESTERASE UR QL STRIP: NEGATIVE
LIPASE SERPL-CCNC: 14 U/L (ref 11–82)
LYMPHOCYTES # BLD AUTO: 2.35 THOUSANDS/ÂΜL (ref 0.6–4.47)
LYMPHOCYTES NFR BLD AUTO: 22 % (ref 14–44)
MCH RBC QN AUTO: 29 PG (ref 26.8–34.3)
MCHC RBC AUTO-ENTMCNC: 33 G/DL (ref 31.4–37.4)
MCV RBC AUTO: 88 FL (ref 82–98)
MONOCYTES # BLD AUTO: 0.94 THOUSAND/ÂΜL (ref 0.17–1.22)
MONOCYTES NFR BLD AUTO: 9 % (ref 4–12)
MUCOUS THREADS UR QL AUTO: ABNORMAL
NEUTROPHILS # BLD AUTO: 6.95 THOUSANDS/ÂΜL (ref 1.85–7.62)
NEUTS SEG NFR BLD AUTO: 66 % (ref 43–75)
NITRITE UR QL STRIP: NEGATIVE
NON-SQ EPI CELLS URNS QL MICRO: ABNORMAL /HPF
NRBC BLD AUTO-RTO: 0 /100 WBCS
PH UR STRIP.AUTO: 5 [PH]
PLATELET # BLD AUTO: 328 THOUSANDS/UL (ref 149–390)
PMV BLD AUTO: 9.5 FL (ref 8.9–12.7)
POTASSIUM SERPL-SCNC: 4 MMOL/L (ref 3.5–5.3)
PROT SERPL-MCNC: 7.3 G/DL (ref 6.4–8.4)
PROT UR STRIP-MCNC: ABNORMAL MG/DL
RBC # BLD AUTO: 5.14 MILLION/UL (ref 3.81–5.12)
RBC #/AREA URNS AUTO: ABNORMAL /HPF
SODIUM SERPL-SCNC: 137 MMOL/L (ref 135–147)
SP GR UR STRIP.AUTO: 1.02 (ref 1–1.03)
UROBILINOGEN UR STRIP-ACNC: <2 MG/DL
WBC # BLD AUTO: 10.56 THOUSAND/UL (ref 4.31–10.16)
WBC #/AREA URNS AUTO: ABNORMAL /HPF

## 2024-01-25 PROCEDURE — 36415 COLL VENOUS BLD VENIPUNCTURE: CPT

## 2024-01-25 PROCEDURE — 74177 CT ABD & PELVIS W/CONTRAST: CPT

## 2024-01-25 PROCEDURE — 83690 ASSAY OF LIPASE: CPT

## 2024-01-25 PROCEDURE — 99284 EMERGENCY DEPT VISIT MOD MDM: CPT

## 2024-01-25 PROCEDURE — 96374 THER/PROPH/DIAG INJ IV PUSH: CPT

## 2024-01-25 PROCEDURE — 80053 COMPREHEN METABOLIC PANEL: CPT

## 2024-01-25 PROCEDURE — G1004 CDSM NDSC: HCPCS

## 2024-01-25 PROCEDURE — 96361 HYDRATE IV INFUSION ADD-ON: CPT

## 2024-01-25 PROCEDURE — 81001 URINALYSIS AUTO W/SCOPE: CPT

## 2024-01-25 PROCEDURE — 85025 COMPLETE CBC W/AUTO DIFF WBC: CPT

## 2024-01-25 RX ORDER — ACETAMINOPHEN 325 MG/1
650 TABLET ORAL ONCE
Status: COMPLETED | OUTPATIENT
Start: 2024-01-25 | End: 2024-01-25

## 2024-01-25 RX ORDER — OXYCODONE HYDROCHLORIDE 5 MG/1
5 TABLET ORAL EVERY 4 HOURS PRN
Qty: 10 TABLET | Refills: 0 | Status: SHIPPED | OUTPATIENT
Start: 2024-01-25 | End: 2024-02-04

## 2024-01-25 RX ORDER — METOCLOPRAMIDE 10 MG/1
10 TABLET ORAL ONCE
Status: COMPLETED | OUTPATIENT
Start: 2024-01-25 | End: 2024-01-25

## 2024-01-25 RX ORDER — KETOROLAC TROMETHAMINE 30 MG/ML
15 INJECTION, SOLUTION INTRAMUSCULAR; INTRAVENOUS ONCE
Status: COMPLETED | OUTPATIENT
Start: 2024-01-25 | End: 2024-01-25

## 2024-01-25 RX ADMIN — METOCLOPRAMIDE 10 MG: 10 TABLET ORAL at 08:39

## 2024-01-25 RX ADMIN — IOHEXOL 100 ML: 350 INJECTION, SOLUTION INTRAVENOUS at 09:39

## 2024-01-25 RX ADMIN — ACETAMINOPHEN 650 MG: 325 TABLET, FILM COATED ORAL at 08:39

## 2024-01-25 RX ADMIN — KETOROLAC TROMETHAMINE 15 MG: 30 INJECTION, SOLUTION INTRAMUSCULAR; INTRAVENOUS at 08:42

## 2024-01-25 RX ADMIN — SODIUM CHLORIDE 1000 ML: 0.9 INJECTION, SOLUTION INTRAVENOUS at 08:42

## 2024-01-25 NOTE — Clinical Note
Mone Berumen was seen and treated in our emergency department on 1/25/2024.    No restrictions            Diagnosis:     Mone  may return to work on return date, is off the rest of the shift today.    She may return on this date: 01/29/2024         If you have any questions or concerns, please don't hesitate to call.      Adrian Thomas PA-C    ______________________________           _______________          _______________  Hospital Representative                              Date                                Time

## 2024-01-25 NOTE — ED PROVIDER NOTES
History  Chief Complaint   Patient presents with    Abdominal Pain     Started Monday with abd pain, vomiting and diarrhea. Has not let up since. Subjective fever, normal temps though. Also noted a bump on the top of her head unsure if she got bit by something, denies HS.      43-year-old female presents today with concerns of abdominal pain, vomiting, diarrhea, nausea that started on Monday.  Patient states that it gets worse after eating food and she is not able to eat anything down.  Feels very dehydrated.  No blood in the vomit or stool.  Denies any urinary symptoms.  No flank pain.  States that the pain is mainly in the right upper quadrant and right lower quadrant.  Does not radiate to the chest from the back.  Denies any chance of pregnancy.  Subjective fevers, chills.  No chest pain or shortness of breath.  Patient states she does feel somewhat lightheaded but she does not feel dizzy and has no vertigo.        Prior to Admission Medications   Prescriptions Last Dose Informant Patient Reported? Taking?   aluminum-magnesium hydroxide-simethicone (MYLANTA) 200-200-20 mg/5 mL suspension   No No   Sig: Take 30 mL by mouth every 4 (four) hours as needed for indigestion or heartburn   guaifenesin-codeine (GUAIFENESIN AC) 100-10 MG/5ML liquid   No No   Sig: TAKE 1-2 TSP BY  MOUTH EVERY 4-6 HOURS AS NEEDED FOR COUGH   metoclopramide (REGLAN) 5 mg tablet  Self Yes No   Sig: Take 5 mg by mouth 3 (three) times a day as needed    pantoprazole (PROTONIX) 40 mg tablet   Yes No      Facility-Administered Medications: None       Past Medical History:   Diagnosis Date    Gastroparesis     Migraine     Pseudotumor cerebri     Renal disorder     kidney stones       Past Surgical History:   Procedure Laterality Date    CARPAL TUNNEL RELEASE Right     [place] RETROGRADE PYELOGRAM  6/26/2021    FL RETROGRADE PYELOGRAM  7/10/2021    [place] CYSTO/URETERO W/LITHOTRIPSY &INDWELL STENT INSRT Left 6/26/2021    Procedure: CYSTOSCOPY URETEROSCOPY  WITH , RETROGRADE PYELOGRAM AND INSERTION STENT URETERAL;  Surgeon: Rehan Sanchez MD;  Location: [facility];  Service: Urology    [place] CYSTO/URETERO W/LITHOTRIPSY &INDWELL STENT INSRT Left 7/10/2021    Procedure: CYSTOSCOPY, URETEROSCOPY, STONE MANIPULATION WITH BASKET EXTRACTION, RETROGRADE PYELOGRAM AND INSERTION STENT URETERAL;  Surgeon: Rehan Sanchez MD;  Location: [facility];  Service: Urology       History reviewed. No pertinent family history.  I have reviewed and agree with the history as documented.    E-Cigarette/Vaping    E-Cigarette Use Never User      E-Cigarette/Vaping Substances    Nicotine No     THC No     CBD No     Flavoring No     Other No     Unknown No      Social History     Tobacco Use    Smoking status: Never    Smokeless tobacco: Never   Vaping Use    Vaping status: Never Used   Substance Use Topics    Alcohol use: Never    Drug use: Never       Review of Systems   Constitutional:  Negative for chills and fever.   HENT:  Negative for ear pain and sore throat.    Eyes:  Negative for pain and visual disturbance.   Respiratory:  Negative for cough and shortness of breath.    Cardiovascular:  Negative for chest pain and palpitations.   Gastrointestinal:  Positive for abdominal pain, diarrhea, nausea and vomiting.   Genitourinary:  Negative for dysuria, flank pain and hematuria.   Musculoskeletal:  Negative for arthralgias and back pain.   Skin:  Negative for color change and rash.   Neurological:  Negative for dizziness, seizures, syncope, weakness, light-headedness and headaches.   All other systems reviewed and are negative.      Physical Exam  Physical Exam  Vitals and nursing note reviewed.   Constitutional:       General: She is not in acute distress.     Appearance: She is well-developed.   HENT:      Head: Normocephalic and atraumatic.   Eyes:      Conjunctiva/sclera: Conjunctivae normal.   Cardiovascular:      Rate and Rhythm: Normal rate and regular rhythm.      Heart sounds:  No murmur heard.  Pulmonary:      Effort: Pulmonary effort is normal. No respiratory distress.      Breath sounds: Normal breath sounds.   Abdominal:      Palpations: Abdomen is soft.      Tenderness: There is generalized abdominal tenderness and tenderness in the right upper quadrant.   Musculoskeletal:         General: No swelling.      Cervical back: Neck supple.   Skin:     General: Skin is warm and dry.      Capillary Refill: Capillary refill takes less than 2 seconds.   Neurological:      Mental Status: She is alert.   Psychiatric:         Mood and Affect: Mood normal.         Vital Signs  ED Triage Vitals   Temperature Pulse Respirations Blood Pressure SpO2   01/25/24 0756 01/25/24 0756 01/25/24 0756 01/25/24 0756 01/25/24 0756   98.7 °F (37.1 °C) (!) 115 18 146/79 96 %      Temp Source Heart Rate Source Patient Position - Orthostatic VS BP Location FiO2 (%)   01/25/24 0756 01/25/24 0756 01/25/24 0756 01/25/24 0756 --   Oral Monitor Sitting Right arm       Pain Score       01/25/24 0839       8           Vitals:    01/25/24 0756 01/25/24 0945   BP: 146/79 133/83   Pulse: (!) 115 98   Patient Position - Orthostatic VS: Sitting Lying         Visual Acuity      ED Medications  Medications   sodium chloride 0.9 % bolus 1,000 mL (0 mL Intravenous Stopped 1/25/24 1040)   metoclopramide (REGLAN) tablet 10 mg (10 mg Oral Given 1/25/24 0839)   acetaminophen (TYLENOL) tablet 650 mg (650 mg Oral Given 1/25/24 0839)   ketorolac (TORADOL) injection 15 mg (15 mg Intravenous Given 1/25/24 0842)   iohexol (OMNIPAQUE) 350 MG/ML injection (MULTI-DOSE) 100 mL (100 mL Intravenous Given 1/25/24 0939)       Diagnostic Studies  Results Reviewed       Procedure Component Value Units Date/Time    Urine Microscopic [[id number]]  (Abnormal) Collected: 01/25/24 0852    Lab Status: Final result Specimen: Urine, Clean Catch Updated: 01/25/24 0934     RBC, UA 2-4 /hpf      WBC, UA None Seen /hpf      Epithelial Cells Moderate /hpf       Bacteria, UA Occasional /hpf      MUCUS THREADS Moderate    UA w Reflex to Microscopic w Reflex to Culture [[id number]]  (Abnormal) Collected: 01/25/24 0852    Lab Status: Final result Specimen: Urine, Clean Catch Updated: 01/25/24 0919     Color, UA Light Orange     Clarity, UA Extra Turbid     Specific Gravity, UA 1.023     pH, UA 5.0     Leukocytes, UA Negative     Nitrite, UA Negative     Protein, UA Trace mg/dl      Glucose, UA Negative mg/dl      Ketones, UA Negative mg/dl      Urobilinogen, UA <2.0 mg/dl      Bilirubin, UA Negative     Occult Blood, UA Negative    Comprehensive metabolic panel [[id number]] Collected: 01/25/24 0840    Lab Status: Final result Specimen: Blood from Arm, Left Updated: 01/25/24 0907     Sodium 137 mmol/L      Potassium 4.0 mmol/L      Chloride 102 mmol/L      CO2 25 mmol/L      ANION GAP 10 mmol/L      BUN 13 mg/dL      Creatinine 0.61 mg/dL      Glucose 120 mg/dL      Calcium 9.2 mg/dL      AST 21 U/L      ALT 25 U/L      Alkaline Phosphatase 67 U/L      Total Protein 7.3 g/dL      Albumin 4.2 g/dL      Total Bilirubin 0.33 mg/dL      eGFR 112 ml/min/1.73sq m     Narrative:      National Kidney Disease Foundation guidelines for Chronic Kidney Disease (CKD):     Stage 1 with normal or high GFR (GFR > 90 mL/min/1.73 square meters)    Stage 2 Mild CKD (GFR = 60-89 mL/min/1.73 square meters)    Stage 3A Moderate CKD (GFR = 45-59 mL/min/1.73 square meters)    Stage 3B Moderate CKD (GFR = 30-44 mL/min/1.73 square meters)    Stage 4 Severe CKD (GFR = 15-29 mL/min/1.73 square meters)    Stage 5 End Stage CKD (GFR <15 mL/min/1.73 square meters)  Note: GFR calculation is accurate only with a steady state creatinine    Lipase [[id number]]  (Normal) Collected: 01/25/24 0840    Lab Status: Final result Specimen: Blood from Arm, Left Updated: 01/25/24 0907     Lipase 14 u/L     CBC and differential [[id number]]  (Abnormal) Collected: 01/25/24 0840    Lab Status: Final result Specimen: Blood from  Arm, Left Updated: 01/25/24 0849     WBC 10.56 Thousand/uL      RBC 5.14 Million/uL      Hemoglobin 14.9 g/dL      Hematocrit 45.2 %      MCV 88 fL      MCH 29.0 pg      MCHC 33.0 g/dL      RDW 13.6 %      MPV 9.5 fL      Platelets 328 Thousands/uL      nRBC 0 /100 WBCs      Neutrophils Relative 66 %      Immat GRANS % 1 %      Lymphocytes Relative 22 %      Monocytes Relative 9 %      Eosinophils Relative 2 %      Basophils Relative 0 %      Neutrophils Absolute 6.95 Thousands/µL      Immature Grans Absolute 0.06 Thousand/uL      Lymphocytes Absolute 2.35 Thousands/µL      Monocytes Absolute 0.94 Thousand/µL      Eosinophils Absolute 0.22 Thousand/µL      Basophils Absolute 0.04 Thousands/µL                    CT abdomen pelvis with contrast   Final Result by Iker Little MD (01/25 1007)      No acute intra-abdominal abnormality.      Hepatic steatosis.         Workstation performed: [id number]                    Procedures  Procedures         ED Course  ED Course as of 01/25/24 1620   Thu Jan 25, 2024   0850 WBC(!): 10.56   0850 RBC(!): 5.14   0850 Hemoglobin: 14.9   0850 Platelet Count: 328   0909 Sodium: 137   0909 Potassium: 4.0   0909 Creatinine: 0.61   0909 GLUCOSE: 120   0909 GFR, Calculated: 112   0909 Total Bilirubin: 0.33   0920 Leukocytes, UA: Negative   0920 Nitrite, UA: Negative   0920 Glucose, UA: Negative   0920 Ketones, UA: Negative   0920 Blood, UA: Negative   0920 Patient states she does not want a pregnancy test prior to CT scan as she is not sexually active with men, and has a wife.                               SBIRT 22yo+      Flowsheet Row Most Recent Value   Initial Alcohol Screen: US AUDIT-C     1. How often do you have a drink containing alcohol? 0 Filed at: 01/25/2024 0756   2. How many drinks containing alcohol do you have on a typical day you are drinking?  0 Filed at: 01/25/2024 0756   3a. Male UNDER 65: How often do you have five or more drinks on one occasion? 0 Filed at:  "01/25/2024 0756   3b. FEMALE Any Age, or MALE 65+: How often do you have 4 or more drinks on one occassion? 0 Filed at: 01/25/2024 0756   Audit-C Score 0 Filed at: 01/25/2024 0756   JOSE: How many times in the past year have you...    Used an illegal drug or used a prescription medication for non-medical reasons? Never Filed at: 01/25/2024 0756                      Medical Decision Making  43 year old female with abdominal, N/V/D.  Labs, meds, CT.  Lab workup.  Mild leukocytosis on lab workup, otherwise reassuring.  CT abd pelv positive for hepatic steatosis without any other acute abdominopelvic abnormality.  Patient feeling better after medications.  Will discharge with close follow up family doctor for recheck. Pain medications sent to patient's pharmacy.  ------------------------------------------------------------  Strict return precautions discussed. Patient at time of discharge well-appearing in no acute distress, all questions answered. Patient agreeable to plan.  Patient's vitals, lab/imaging results, diagnosis, and treatment plan were discussed with the patient. All new/changed medications were discussed with patient, specifically, route of administration, how often and when to take, and where they can be picked up. Strict return precautions as well as close follow up with PCP was discussed with the patient and the patient was agreeable to my recommendations.  Patient verbally acknowledged understanding of the above communications. All labs reviewed and utilized in the medical decision making process (if labs were ordered). Portions of the record may have been created with voice recognition software.  Occasional wrong word or \"sound a like\" substitutions may have occurred due to the inherent limitations of voice recognition software.  Read the chart carefully and recognize, using context, where substitutions have occurred.      Amount and/or Complexity of Data Reviewed  Labs: ordered. Decision-making " details documented in ED Course.  Radiology: ordered.    Risk  OTC drugs.  Prescription drug management.             Disposition  Final diagnoses:   Gastroenteritis     Time reflects when diagnosis was documented in both MDM as applicable and the Disposition within this note       Time User Action Codes Description Comment    1/25/2024 10:15 AM Adrian Thomas Add [K52.9] Gastroenteritis           ED Disposition       ED Disposition   Discharge    Condition   Stable    Date/Time   Thu Jan 25, 2024 1015    Comment   Mone Cousins discharge to home/self care.                   Follow-up Information       Follow up With Specialties Details Why Contact Info Additional Information    [facility] Emergency Department Emergency Medicine Go to  If symptoms worsen [address] [place] [id number]  [phone] [facility] Emergency Department, [address] [place] [address], [phone]    Ethan Mora,  Family Medicine Schedule an appointment as soon as possible for a visit  As needed [address] [place] [address]  [phone]               Discharge Medication List as of 1/25/2024 10:25 AM        START taking these medications    Details   oxyCODONE (Roxicodone) 5 immediate release tablet Take 1 tablet (5 mg total) by mouth every 4 (four) hours as needed for moderate pain for up to 10 days Max Daily Amount: 30 mg, Starting Thu 1/25/2024, Until Sun 2/4/2024 at 2359, Normal           CONTINUE these medications which have NOT CHANGED    Details   aluminum-magnesium hydroxide-simethicone (MYLANTA) 200-200-20 mg/5 mL suspension Take 30 mL by mouth every 4 (four) hours as needed for indigestion or heartburn, Starting Fri 5/6/2022, Normal      guaifenesin-codeine (GUAIFENESIN AC) 100-10 MG/5ML liquid TAKE 1-2 TSP BY  MOUTH EVERY 4-6 HOURS AS NEEDED FOR COUGH, Normal      metoclopramide (REGLAN) 5 mg tablet Take 5 mg by mouth 3 (three)  times a day as needed , Starting Sun 11/25/2018, Historical Med      pantoprazole (PROTONIX) 40 mg tablet Starting Mon 12/20/2021, Historical Med             No discharge procedures on file.    PDMP Review         Value Time User    PDMP Reviewed  Yes 12/23/2021  7:49 PM NOE Delcid            ED Provider  Electronically Signed by             Adrian Thomas PA-C  01/25/24 4556

## 2024-02-16 ENCOUNTER — HOSPITAL ENCOUNTER (EMERGENCY)
Facility: HOSPITAL | Age: 44
Discharge: HOME | End: 2024-02-16
Attending: EMERGENCY MEDICINE
Payer: COMMERCIAL

## 2024-02-16 VITALS
DIASTOLIC BLOOD PRESSURE: 84 MMHG | OXYGEN SATURATION: 96 % | HEART RATE: 97 BPM | SYSTOLIC BLOOD PRESSURE: 158 MMHG | RESPIRATION RATE: 18 BRPM | TEMPERATURE: 98 F

## 2024-02-16 DIAGNOSIS — R10.9 ABDOMINAL WALL PAIN IN LEFT FLANK: Primary | ICD-10-CM

## 2024-02-16 PROCEDURE — 99284 EMERGENCY DEPT VISIT MOD MDM: CPT | Performed by: EMERGENCY MEDICINE

## 2024-02-16 PROCEDURE — 99283 EMERGENCY DEPT VISIT LOW MDM: CPT

## 2024-02-16 RX ORDER — LIDOCAINE 50 MG/G
1 PATCH TOPICAL ONCE
Status: DISCONTINUED | OUTPATIENT
Start: 2024-02-16 | End: 2024-02-17 | Stop reason: HOSPADM

## 2024-02-16 RX ADMIN — LIDOCAINE 1 PATCH: 50 PATCH CUTANEOUS at 21:57

## 2024-02-17 NOTE — ED PROVIDER NOTES
History  Chief Complaint   Patient presents with    Flank Pain     Pt state she has L sided flank pain that feels similar to a previous kidney stone she has had. Pt denies new N/V/D or dysuria sx.         Very pleasant 43-year-old female presents with left flank pain.,  Says she was sitting down in a restaurant lobby waiting to be seated when she noticed she felt some sharp left flank pain, occurred particularly when she was twisting to the left.,  Has a history of kidney stones and was concerned this may be the start of a kidney stone so she came here for evaluation.,  Describes pain as sharp, occurs with twisting or motion better when holding still, feels different than kidney stones this does not radiate to the groin, no nausea no vomiting, no urinary symptoms, was in normal state of health prior to this.    History obtained from patient and her wife      Flank Pain  Associated symptoms: no chest pain, no fever and no shortness of breath        Prior to Admission Medications   Prescriptions Last Dose Informant Patient Reported? Taking?   aluminum-magnesium hydroxide-simethicone (MYLANTA) 200-200-20 mg/5 mL suspension   No No   Sig: Take 30 mL by mouth every 4 (four) hours as needed for indigestion or heartburn   guaifenesin-codeine (GUAIFENESIN AC) 100-10 MG/5ML liquid   No No   Sig: TAKE 1-2 TSP BY  MOUTH EVERY 4-6 HOURS AS NEEDED FOR COUGH   metoclopramide (REGLAN) 5 mg tablet  Self Yes No   Sig: Take 5 mg by mouth 3 (three) times a day as needed    pantoprazole (PROTONIX) 40 mg tablet   Yes No      Facility-Administered Medications: None       Past Medical History:   Diagnosis Date    Gastroparesis     Migraine     Pseudotumor cerebri     Renal disorder     kidney stones       Past Surgical History:   Procedure Laterality Date    CARPAL TUNNEL RELEASE Right     [place] RETROGRADE PYELOGRAM  6/26/2021    FL RETROGRADE PYELOGRAM  7/10/2021    [place] CYSTO/URETERO W/LITHOTRIPSY &INDWELL STENT INSRT Left 6/26/2021     Procedure: CYSTOSCOPY URETEROSCOPY WITH , RETROGRADE PYELOGRAM AND INSERTION STENT URETERAL;  Surgeon: Rehan Sanchez MD;  Location: [facility];  Service: Urology    [place] CYSTO/URETERO W/LITHOTRIPSY &INDWELL STENT INSRT Left 7/10/2021    Procedure: CYSTOSCOPY, URETEROSCOPY, STONE MANIPULATION WITH BASKET EXTRACTION, RETROGRADE PYELOGRAM AND INSERTION STENT URETERAL;  Surgeon: Rehan Sanchez MD;  Location: [facility];  Service: Urology       History reviewed. No pertinent family history.  I have reviewed and agree with the history as documented.    E-Cigarette/Vaping    E-Cigarette Use Never User      E-Cigarette/Vaping Substances    Nicotine No     THC No     CBD No     Flavoring No     Other No     Unknown No      Social History     Tobacco Use    Smoking status: Never    Smokeless tobacco: Never   Vaping Use    Vaping status: Never Used   Substance Use Topics    Alcohol use: Never    Drug use: Never       Review of Systems   Constitutional:  Negative for fever.   Respiratory:  Negative for chest tightness and shortness of breath.    Cardiovascular:  Negative for chest pain.   Genitourinary:  Positive for flank pain.   Skin:  Negative for rash.   Neurological:  Negative for dizziness, light-headedness and headaches.       Physical Exam  Physical Exam  Vitals reviewed.   Constitutional:       General: She is not in acute distress.     Appearance: She is well-developed. She is not diaphoretic.   HENT:      Head: Normocephalic and atraumatic.      Right Ear: External ear normal.      Left Ear: External ear normal.      Nose: Nose normal.   Eyes:      General: No scleral icterus.        Right eye: No discharge.         Left eye: No discharge.      Conjunctiva/sclera: Conjunctivae normal.      Pupils: Pupils are equal, round, and reactive to light.   Neck:      Trachea: No tracheal deviation.   Cardiovascular:      Rate and Rhythm: Normal rate and regular rhythm.      Heart sounds: Normal heart sounds. No murmur  heard.  Pulmonary:      Effort: Pulmonary effort is normal. No respiratory distress.      Breath sounds: Normal breath sounds. No stridor.   Abdominal:      General: There is no distension.      Palpations: Abdomen is soft.      Tenderness: There is no abdominal tenderness. There is no guarding or rebound.   Musculoskeletal:         General: No deformity. Normal range of motion.      Cervical back: Normal range of motion and neck supple.      Comments: Back/flank pain reproduced by truncal rotation to the left   Skin:     General: Skin is warm and dry.      Coloration: Skin is not pale.      Findings: No erythema or rash.   Neurological:      General: No focal deficit present.      Mental Status: She is alert and oriented to person, place, and time.      Motor: No abnormal muscle tone.      Coordination: Coordination normal.         Vital Signs  ED Triage Vitals [02/16/24 2050]   Temperature Pulse Respirations Blood Pressure SpO2   98 °F (36.7 °C) 97 18 158/84 96 %      Temp src Heart Rate Source Patient Position - Orthostatic VS BP Location FiO2 (%)   -- Monitor Lying Right arm --      Pain Score       --           Vitals:    02/16/24 2050   BP: 158/84   Pulse: 97   Patient Position - Orthostatic VS: Lying         Visual Acuity      ED Medications  Medications   lidocaine (LIDODERM) 5 % patch 1 patch (1 patch Topical Medication Applied 2/16/24 2157)       Diagnostic Studies  Results Reviewed       None                   No orders to display              Procedures  Procedures         ED Course                                             Medical Decision Making        Initial ED assessment:     43-year-old female, left flank pain, had a CT scan few weeks prior, personally reviewed CT scan reviewed images, no evidence of intrarenal nephrolithiasis at that time.  Left flank pain fully reproducible with truncal rotation at time of examination    Initial DDx includes but is not limited to:   Musculoskeletal pain less  likely nephrolithiasis as she did not have a nephrolithiasis on CT scan a few weeks prior    Initial ED plan:   Lidocaine patch        Final ED summary/disposition:   After evaluation and workup in the emergency department, lidocaine patch applied to area, will discharge recommended over-the-counter lidocaine patches if pain returns    Risk  Prescription drug management.             Disposition  Final diagnoses:   Abdominal wall pain in left flank     Time reflects when diagnosis was documented in both MDM as applicable and the Disposition within this note       Time User Action Codes Description Comment    2/16/2024  9:30 PM Caesar Simmons Add [R10.9] Abdominal wall pain in left flank           ED Disposition       ED Disposition   Discharge    Condition   Stable    Date/Time   Fri Feb 16, 2024  9:30 PM    Comment   Mone Cousins discharge to home/self care.                   Follow-up Information    None         Patient's Medications   Discharge Prescriptions    No medications on file       No discharge procedures on file.    PDMP Review         Value Time User    PDMP Reviewed  Yes 12/23/2021  7:49 PM NOE Delcid            ED Provider  Electronically Signed by             Caesar Simmons DO  02/16/24 6797

## 2024-03-03 ENCOUNTER — HOSPITAL ENCOUNTER (EMERGENCY)
Facility: HOSPITAL | Age: 44
Discharge: HOME | End: 2024-03-03
Attending: EMERGENCY MEDICINE
Payer: COMMERCIAL

## 2024-03-03 ENCOUNTER — APPOINTMENT (EMERGENCY)
Dept: CT IMAGING | Facility: HOSPITAL | Age: 44
End: 2024-03-03
Payer: COMMERCIAL

## 2024-03-03 VITALS
BODY MASS INDEX: 45.31 KG/M2 | WEIGHT: 240 LBS | SYSTOLIC BLOOD PRESSURE: 144 MMHG | HEART RATE: 89 BPM | OXYGEN SATURATION: 97 % | TEMPERATURE: 98.7 F | HEIGHT: 61 IN | RESPIRATION RATE: 20 BRPM | DIASTOLIC BLOOD PRESSURE: 89 MMHG

## 2024-03-03 DIAGNOSIS — N20.0 NEPHROLITHIASIS: Primary | ICD-10-CM

## 2024-03-03 LAB
ALBUMIN SERPL BCP-MCNC: 4 G/DL (ref 3.5–5)
ALP SERPL-CCNC: 64 U/L (ref 34–104)
ALT SERPL W P-5'-P-CCNC: 17 U/L (ref 7–52)
AMORPH URATE CRY URNS QL MICRO: ABNORMAL
ANION GAP SERPL CALCULATED.3IONS-SCNC: 12 MMOL/L
AST SERPL W P-5'-P-CCNC: 15 U/L (ref 13–39)
B-HCG SERPL-ACNC: <1 MIU/ML (ref 0–5)
BACTERIA UR QL AUTO: ABNORMAL /HPF
BASOPHILS # BLD MANUAL: 0 THOUSAND/UL (ref 0–0.1)
BASOPHILS NFR MAR MANUAL: 0 % (ref 0–1)
BILIRUB SERPL-MCNC: 0.29 MG/DL (ref 0.2–1)
BILIRUB UR QL STRIP: NEGATIVE
BUN SERPL-MCNC: 20 MG/DL (ref 5–25)
CALCIUM SERPL-MCNC: 9.1 MG/DL (ref 8.4–10.2)
CHLORIDE SERPL-SCNC: 104 MMOL/L (ref 96–108)
CLARITY UR: ABNORMAL
CO2 SERPL-SCNC: 23 MMOL/L (ref 21–32)
COLOR UR: ABNORMAL
CREAT SERPL-MCNC: 0.69 MG/DL (ref 0.6–1.3)
EOSINOPHIL # BLD MANUAL: 0.38 THOUSAND/UL (ref 0–0.4)
EOSINOPHIL NFR BLD MANUAL: 3 % (ref 0–6)
ERYTHROCYTE [DISTWIDTH] IN BLOOD BY AUTOMATED COUNT: 13.1 % (ref 11.6–15.1)
EXT PREGNANCY TEST URINE: NEGATIVE
EXT. CONTROL: NORMAL
GFR SERPL CREATININE-BSD FRML MDRD: 108 ML/MIN/1.73SQ M
GLUCOSE SERPL-MCNC: 129 MG/DL (ref 65–140)
GLUCOSE UR STRIP-MCNC: NEGATIVE MG/DL
HCT VFR BLD AUTO: 41.7 % (ref 34.8–46.1)
HGB BLD-MCNC: 14 G/DL (ref 11.5–15.4)
HGB UR QL STRIP.AUTO: ABNORMAL
KETONES UR STRIP-MCNC: NEGATIVE MG/DL
LEUKOCYTE ESTERASE UR QL STRIP: ABNORMAL
LG PLATELETS BLD QL SMEAR: PRESENT
LIPASE SERPL-CCNC: 25 U/L (ref 11–82)
LYMPHOCYTES # BLD AUTO: 36 % (ref 14–44)
LYMPHOCYTES # BLD AUTO: 4.86 THOUSAND/UL (ref 0.6–4.47)
MCH RBC QN AUTO: 29.4 PG (ref 26.8–34.3)
MCHC RBC AUTO-ENTMCNC: 33.6 G/DL (ref 31.4–37.4)
MCV RBC AUTO: 87 FL (ref 82–98)
MONOCYTES # BLD AUTO: 0.64 THOUSAND/UL (ref 0–1.22)
MONOCYTES NFR BLD: 5 % (ref 4–12)
MUCOUS THREADS UR QL AUTO: ABNORMAL
NEUTROPHILS # BLD MANUAL: 6.9 THOUSAND/UL (ref 1.85–7.62)
NEUTS BAND NFR BLD MANUAL: 2 % (ref 0–8)
NEUTS SEG NFR BLD AUTO: 52 % (ref 43–75)
NITRITE UR QL STRIP: NEGATIVE
NON-SQ EPI CELLS URNS QL MICRO: ABNORMAL /HPF
PH UR STRIP.AUTO: 6 [PH]
PLATELET # BLD AUTO: 372 THOUSANDS/UL (ref 149–390)
PLATELET BLD QL SMEAR: ADEQUATE
PMV BLD AUTO: 9.7 FL (ref 8.9–12.7)
POTASSIUM SERPL-SCNC: 4.2 MMOL/L (ref 3.5–5.3)
PROT SERPL-MCNC: 6.8 G/DL (ref 6.4–8.4)
PROT UR STRIP-MCNC: ABNORMAL MG/DL
RBC # BLD AUTO: 4.77 MILLION/UL (ref 3.81–5.12)
RBC #/AREA URNS AUTO: ABNORMAL /HPF
RBC MORPH BLD: NORMAL
SODIUM SERPL-SCNC: 139 MMOL/L (ref 135–147)
SP GR UR STRIP.AUTO: 1.02 (ref 1–1.03)
UROBILINOGEN UR STRIP-ACNC: <2 MG/DL
VARIANT LYMPHS # BLD AUTO: 2 %
WBC # BLD AUTO: 12.78 THOUSAND/UL (ref 4.31–10.16)
WBC #/AREA URNS AUTO: ABNORMAL /HPF

## 2024-03-03 PROCEDURE — 85027 COMPLETE CBC AUTOMATED: CPT | Performed by: EMERGENCY MEDICINE

## 2024-03-03 PROCEDURE — 96374 THER/PROPH/DIAG INJ IV PUSH: CPT

## 2024-03-03 PROCEDURE — 87086 URINE CULTURE/COLONY COUNT: CPT | Performed by: EMERGENCY MEDICINE

## 2024-03-03 PROCEDURE — 83690 ASSAY OF LIPASE: CPT | Performed by: EMERGENCY MEDICINE

## 2024-03-03 PROCEDURE — 85007 BL SMEAR W/DIFF WBC COUNT: CPT | Performed by: EMERGENCY MEDICINE

## 2024-03-03 PROCEDURE — 96361 HYDRATE IV INFUSION ADD-ON: CPT

## 2024-03-03 PROCEDURE — 74177 CT ABD & PELVIS W/CONTRAST: CPT

## 2024-03-03 PROCEDURE — 99284 EMERGENCY DEPT VISIT MOD MDM: CPT

## 2024-03-03 PROCEDURE — 84702 CHORIONIC GONADOTROPIN TEST: CPT | Performed by: EMERGENCY MEDICINE

## 2024-03-03 PROCEDURE — 81025 URINE PREGNANCY TEST: CPT | Performed by: EMERGENCY MEDICINE

## 2024-03-03 PROCEDURE — 80053 COMPREHEN METABOLIC PANEL: CPT | Performed by: EMERGENCY MEDICINE

## 2024-03-03 PROCEDURE — 36415 COLL VENOUS BLD VENIPUNCTURE: CPT

## 2024-03-03 PROCEDURE — 81001 URINALYSIS AUTO W/SCOPE: CPT | Performed by: EMERGENCY MEDICINE

## 2024-03-03 RX ORDER — KETOROLAC TROMETHAMINE 30 MG/ML
15 INJECTION, SOLUTION INTRAMUSCULAR; INTRAVENOUS ONCE
Status: COMPLETED | OUTPATIENT
Start: 2024-03-03 | End: 2024-03-03

## 2024-03-03 RX ORDER — KETOROLAC TROMETHAMINE 10 MG/1
10 TABLET, FILM COATED ORAL EVERY 6 HOURS PRN
Qty: 10 TABLET | Refills: 0 | Status: SHIPPED | OUTPATIENT
Start: 2024-03-03 | End: 2024-03-07

## 2024-03-03 RX ORDER — ONDANSETRON 2 MG/ML
4 INJECTION INTRAMUSCULAR; INTRAVENOUS EVERY 4 HOURS PRN
Status: DISCONTINUED | OUTPATIENT
Start: 2024-03-03 | End: 2024-03-03 | Stop reason: HOSPADM

## 2024-03-03 RX ORDER — ONDANSETRON 4 MG/1
4 TABLET, FILM COATED ORAL EVERY 6 HOURS
Qty: 12 TABLET | Refills: 0 | Status: SHIPPED | OUTPATIENT
Start: 2024-03-03

## 2024-03-03 RX ORDER — CEFPODOXIME PROXETIL 200 MG/1
200 TABLET, FILM COATED ORAL 2 TIMES DAILY WITH MEALS
Status: DISCONTINUED | OUTPATIENT
Start: 2024-03-03 | End: 2024-03-03

## 2024-03-03 RX ORDER — CEFPODOXIME PROXETIL 200 MG/1
200 TABLET, FILM COATED ORAL 2 TIMES DAILY
Qty: 14 TABLET | Refills: 0 | Status: SHIPPED | OUTPATIENT
Start: 2024-03-03 | End: 2024-03-07

## 2024-03-03 RX ORDER — CEFPODOXIME PROXETIL 200 MG/1
200 TABLET, FILM COATED ORAL 2 TIMES DAILY
Qty: 10 TABLET | Refills: 0 | Status: CANCELLED | OUTPATIENT
Start: 2024-03-03 | End: 2024-03-08

## 2024-03-03 RX ADMIN — SODIUM CHLORIDE 1000 ML: 0.9 INJECTION, SOLUTION INTRAVENOUS at 06:31

## 2024-03-03 RX ADMIN — IOHEXOL 100 ML: 350 INJECTION, SOLUTION INTRAVENOUS at 07:44

## 2024-03-03 RX ADMIN — KETOROLAC TROMETHAMINE 15 MG: 30 INJECTION, SOLUTION INTRAMUSCULAR; INTRAVENOUS at 07:21

## 2024-03-03 NOTE — ED PROVIDER NOTES
History  Chief Complaint   Patient presents with    Abdominal Pain     Pt BIBA from home with reports of sudden onset of severe RLQ abdominal pain, that started at approx 530 this morning. Pt reports being awoken from sleep from the pain. Pt denies trauma and previous surgeries to abdomen. Pt started period yesterday and reports hx of kidney stones. Reports this pain is different.     Ms. Cousin is 43 year old female with PMH gastroptosis, migraine,nephrolithiasis who presented with severe, sudden, sharp, and intermittent right lower abdominal pain. Patient stated that the pain woke her up at 5:30 this morning and felt like 2021 when she had kidney stone. She had nausea but no vomiting and took Zofran and Midol. Patient is currently on her period so its hard to tell if she is actively bleed with urine. When she raises her legs and lay flat, she feels a little better.Her last menstrual cycle started yesterday while she was going bed.She feels hot but her temperature was normal. She denied recent illness, sick contact and  symptoms. She reported constipation but had small bowel movement. Patient is currently not on birth control but reported irregular period. She is  to a woman and the last time she was on OCP was 2018.         Prior to Admission Medications   Prescriptions Last Dose Informant Patient Reported? Taking?   aluminum-magnesium hydroxide-simethicone (MYLANTA) 200-200-20 mg/5 mL suspension   No No   Sig: Take 30 mL by mouth every 4 (four) hours as needed for indigestion or heartburn   guaifenesin-codeine (GUAIFENESIN AC) 100-10 MG/5ML liquid   No No   Sig: TAKE 1-2 TSP BY  MOUTH EVERY 4-6 HOURS AS NEEDED FOR COUGH   metoclopramide (REGLAN) 5 mg tablet  Self Yes No   Sig: Take 5 mg by mouth 3 (three) times a day as needed    pantoprazole (PROTONIX) 40 mg tablet   Yes No      Facility-Administered Medications: None       Past Medical History:   Diagnosis Date    Gastroparesis     Migraine      Pseudotumor cerebri     Renal disorder     kidney stones       Past Surgical History:   Procedure Laterality Date    CARPAL TUNNEL RELEASE Right     FL RETROGRADE PYELOGRAM  6/26/2021    FL RETROGRADE PYELOGRAM  7/10/2021    [place] CYSTO/URETERO W/LITHOTRIPSY &INDWELL STENT INSRT Left 6/26/2021    Procedure: CYSTOSCOPY URETEROSCOPY WITH , RETROGRADE PYELOGRAM AND INSERTION STENT URETERAL;  Surgeon: Rehan Sanchez MD;  Location: [facility];  Service: Urology    [place] CYSTO/URETERO W/LITHOTRIPSY &INDWELL STENT INSRT Left 7/10/2021    Procedure: CYSTOSCOPY, URETEROSCOPY, STONE MANIPULATION WITH BASKET EXTRACTION, RETROGRADE PYELOGRAM AND INSERTION STENT URETERAL;  Surgeon: Rehan Sanchez MD;  Location: [facility];  Service: Urology       History reviewed. No pertinent family history.  I have reviewed and agree with the history as documented.    E-Cigarette/Vaping    E-Cigarette Use Never User      E-Cigarette/Vaping Substances    Nicotine No     THC No     CBD No     Flavoring No     Other No     Unknown No      Social History     Tobacco Use    Smoking status: Never    Smokeless tobacco: Never   Vaping Use    Vaping status: Never Used   Substance Use Topics    Alcohol use: Never    Drug use: Never        Review of Systems   Constitutional:  Negative for chills and fever.   HENT:  Negative for ear pain and sore throat.    Eyes:  Negative for pain and visual disturbance.   Respiratory:  Negative for cough and shortness of breath.    Cardiovascular:  Positive for palpitations. Negative for chest pain.   Gastrointestinal:  Positive for abdominal pain and nausea. Negative for vomiting.   Genitourinary:  Negative for dysuria and hematuria.   Musculoskeletal:  Negative for arthralgias and back pain.   Skin:  Negative for color change and rash.   Neurological:  Negative for seizures and syncope.   All other systems reviewed and are negative.      Physical Exam  ED Triage Vitals [03/03/24 0623]   Temperature Pulse  Respirations Blood Pressure SpO2   98.7 °F (37.1 °C) 89 20 144/89 97 %      Temp Source Heart Rate Source Patient Position - Orthostatic VS BP Location FiO2 (%)   Oral Monitor Lying Right arm --      Pain Score       10 - Worst Possible Pain             Orthostatic Vital Signs  Vitals:    03/03/24 0623   BP: 144/89   Pulse: 89   Patient Position - Orthostatic VS: Lying       Physical Exam  Vitals and nursing note reviewed.   Constitutional:       General: She is not in acute distress.     Appearance: She is well-developed.   HENT:      Head: Normocephalic and atraumatic.   Eyes:      Conjunctiva/sclera: Conjunctivae normal.   Cardiovascular:      Rate and Rhythm: Normal rate and regular rhythm.      Heart sounds: No murmur heard.  Pulmonary:      Effort: Pulmonary effort is normal. No respiratory distress.      Breath sounds: Normal breath sounds.   Abdominal:      Palpations: Abdomen is soft.      Tenderness: There is abdominal tenderness in the right lower quadrant. There is no right CVA tenderness or left CVA tenderness.   Musculoskeletal:         General: No swelling.      Cervical back: Neck supple.   Skin:     General: Skin is warm and dry.      Capillary Refill: Capillary refill takes less than 2 seconds.   Neurological:      Mental Status: She is alert.   Psychiatric:         Mood and Affect: Mood normal.         ED Medications  Medications   ondansetron (ZOFRAN) injection 4 mg (has no administration in time range)   sodium chloride 0.9 % bolus 1,000 mL (1,000 mL Intravenous New Bag 3/3/24 0631)   ketorolac (TORADOL) injection 15 mg (15 mg Intravenous Given 3/3/24 0721)   iohexol (OMNIPAQUE) 350 MG/ML injection (MULTI-DOSE) 100 mL (100 mL Intravenous Given 3/3/24 0744)       Diagnostic Studies  Results Reviewed       Procedure Component Value Units Date/Time    hCG, quantitative [[id number]] Collected: 03/03/24 0630    Lab Status: In process Specimen: Blood from Arm, Left Updated: 03/03/24 0822    RBC  Morphology Reflex Test [[id number]] Collected: 03/03/24 0630    Lab Status: Final result Specimen: Blood from Arm, Left Updated: 03/03/24 0801    CBC and differential [[id number]]  (Abnormal) Collected: 03/03/24 0630    Lab Status: Final result Specimen: Blood from Arm, Left Updated: 03/03/24 0742     WBC 12.78 Thousand/uL      RBC 4.77 Million/uL      Hemoglobin 14.0 g/dL      Hematocrit 41.7 %      MCV 87 fL      MCH 29.4 pg      MCHC 33.6 g/dL      RDW 13.1 %      MPV 9.7 fL      Platelets 372 Thousands/uL     Narrative:      This is an appended report.  These results have been appended to a previously verified report.    Manual Differential(PHLEBS Do Not Order) [[id number]]  (Abnormal) Collected: 03/03/24 0630    Lab Status: Final result Specimen: Blood from Arm, Left Updated: 03/03/24 0742     Segmented % 52 %      Bands % 2 %      Lymphocytes % 36 %      Monocytes % 5 %      Eosinophils, % 3 %      Basophils % 0 %      Atypical Lymphocytes % 2 %      Absolute Neutrophils 6.90 Thousand/uL      Lymphocytes Absolute 4.86 Thousand/uL      Monocytes Absolute 0.64 Thousand/uL      Eosinophils Absolute 0.38 Thousand/uL      Basophils Absolute 0.00 Thousand/uL      Total Counted --     RBC Morphology Normal     Platelet Estimate Adequate     Large Platelet Present    Urine Microscopic [[id number]]  (Abnormal) Collected: 03/03/24 0720    Lab Status: Final result Specimen: Urine, Clean Catch Updated: 03/03/24 0740     RBC, UA Innumerable /hpf      WBC, UA Innumerable /hpf      Epithelial Cells Occasional /hpf      Bacteria, UA Occasional /hpf      MUCUS THREADS Moderate     Amorphous Crystals, UA Occasional    Urine culture [[id number]] Collected: 03/03/24 0720    Lab Status: In process Specimen: Urine, Clean Catch Updated: 03/03/24 0740    UA w Reflex to Microscopic w Reflex to Culture [[id number]]  (Abnormal) Collected: 03/03/24 0720    Lab Status: Final result Specimen: Urine, Clean Catch Updated: 03/03/24 0730     Color,  UA Light Orange     Clarity, UA Turbid     Specific Gravity, UA 1.022     pH, UA 6.0     Leukocytes, UA Small     Nitrite, UA Negative     Protein, UA 30 (1+) mg/dl      Glucose, UA Negative mg/dl      Ketones, UA Negative mg/dl      Urobilinogen, UA <2.0 mg/dl      Bilirubin, UA Negative     Occult Blood, UA Large    POCT pregnancy, urine [[id number]]  (Normal) Resulted: 03/03/24 0723    Lab Status: Final result Specimen: Urine Updated: 03/03/24 0723     EXT Preg Test, Ur Negative     Control Valid    Comprehensive metabolic panel [[id number]] Collected: 03/03/24 0630    Lab Status: Final result Specimen: Blood from Arm, Left Updated: 03/03/24 0720     Sodium 139 mmol/L      Potassium 4.2 mmol/L      Chloride 104 mmol/L      CO2 23 mmol/L      ANION GAP 12 mmol/L      BUN 20 mg/dL      Creatinine 0.69 mg/dL      Glucose 129 mg/dL      Calcium 9.1 mg/dL      AST 15 U/L      ALT 17 U/L      Alkaline Phosphatase 64 U/L      Total Protein 6.8 g/dL      Albumin 4.0 g/dL      Total Bilirubin 0.29 mg/dL      eGFR 108 ml/min/1.73sq m     Narrative:      National Kidney Disease Foundation guidelines for Chronic Kidney Disease (CKD):     Stage 1 with normal or high GFR (GFR > 90 mL/min/1.73 square meters)    Stage 2 Mild CKD (GFR = 60-89 mL/min/1.73 square meters)    Stage 3A Moderate CKD (GFR = 45-59 mL/min/1.73 square meters)    Stage 3B Moderate CKD (GFR = 30-44 mL/min/1.73 square meters)    Stage 4 Severe CKD (GFR = 15-29 mL/min/1.73 square meters)    Stage 5 End Stage CKD (GFR <15 mL/min/1.73 square meters)  Note: GFR calculation is accurate only with a steady state creatinine    Lipase [[id number]]  (Normal) Collected: 03/03/24 0630    Lab Status: Final result Specimen: Blood from Arm, Left Updated: 03/03/24 0720     Lipase 25 u/L                    CT abdomen pelvis with contrast   Final Result by Chandan Yang MD (03/03 0749)   1.  4 mm distal right ureteral calculus eliciting mild right hydroureteronephrosis.   2.   Hepatic steatosis.            Workstation performed: [id number]               Procedures  Procedures      ED Course                             SBIRT 22yo+      Flowsheet Row Most Recent Value   Initial Alcohol Screen: US AUDIT-C     1. How often do you have a drink containing alcohol? 0 Filed at: 03/03/2024 0632   2. How many drinks containing alcohol do you have on a typical day you are drinking?  0 Filed at: 03/03/2024 0632   3a. Male UNDER 65: How often do you have five or more drinks on one occasion? 0 Filed at: 03/03/2024 0632   3b. FEMALE Any Age, or MALE 65+: How often do you have 4 or more drinks on one occassion? 0 Filed at: 03/03/2024 0632   Audit-C Score 0 Filed at: 03/03/2024 0632   JOSE: How many times in the past year have you...    Used an illegal drug or used a prescription medication for non-medical reasons? Never Filed at: 03/03/2024 0632                  Medical Decision Making  Patient is a 43 year old female with past medical history of gastroparesis, nephrolithiasis, and migraine who presents with sudden onset of sharp intermittent right lower abdominal pain.  Differential diagnosis includes nephrolithiasis, ovarian cyst rupture, nephrolithiasis, and appendicitis.  Symptomatic treatments with Zofran and Toradol.  CT abdomen/pelvis evident for nephrolithiasis with mild right hydroureteronephrosis .Patient discharged with cefpodoxime secondary to UA showing leukocytosis, Toradol and Zofran symptomatic treatments.  Ambulatory referral to urology.    Amount and/or Complexity of Data Reviewed  External Data Reviewed: labs.  Labs: ordered.  Radiology: ordered.    Risk  Prescription drug management.          Disposition  Final diagnoses:   Nephrolithiasis     Time reflects when diagnosis was documented in both MDM as applicable and the Disposition within this note       Time User Action Codes Description Comment    3/3/2024  8:07 AM Stefani Pinedo Add [N20.0] Nephrolithiasis           ED Disposition        ED Disposition   Discharge    Condition   Stable    Date/Time   Sun Mar 3, 2024 0813    Comment   Mone Raajnsins discharge to home/self care.                   Follow-up Information       Follow up With Specialties Details Why Contact Info Additional Information    [facility] [place] [facility] Call today Please call and make an appointment [address]  [facility] [id number] [phone] [facility] [place], [address], [place], [place], [address] [phone]            Patient's Medications   Discharge Prescriptions    CEFPODOXIME (VANTIN) 200 MG TABLET    Take 1 tablet (200 mg total) by mouth 2 (two) times a day for 7 days       Start Date: 3/3/2024  End Date: 3/10/2024       Order Dose: 200 mg       Quantity: 14 tablet    Refills: 0    KETOROLAC (TORADOL) 10 MG TABLET    Take 1 tablet (10 mg total) by mouth every 6 (six) hours as needed for moderate pain for up to 5 days       Start Date: 3/3/2024  End Date: 3/8/2024       Order Dose: 10 mg       Quantity: 10 tablet    Refills: 0    ONDANSETRON (ZOFRAN) 4 MG TABLET    Take 1 tablet (4 mg total) by mouth every 6 (six) hours       Start Date: 3/3/2024  End Date: --       Order Dose: 4 mg       Quantity: 12 tablet    Refills: 0     No discharge procedures on file.    PDMP Review         Value Time User    PDMP Reviewed  Yes 12/23/2021  7:49 PM NOE Delcid             ED Provider  Attending physically available and evaluated Mone Berumen. I managed the patient along with the ED Attending.    Electronically Signed by           Stefani Pinedo MD  03/03/24 5094

## 2024-03-03 NOTE — Clinical Note
Mone Berumen was seen and treated in our emergency department on 3/3/2024.    No restrictions            Diagnosis:     Mone  may return to work on return date.    She may return on this date: 03/11/2024         If you have any questions or concerns, please don't hesitate to call.      Stefani Pinedo MD    ______________________________           _______________          _______________  Hospital Representative                              Date                                Time

## 2024-03-05 ENCOUNTER — HOSPITAL ENCOUNTER (OUTPATIENT)
Facility: HOSPITAL | Age: 44
Setting detail: OBSERVATION
Discharge: HOME | End: 2024-03-07
Attending: EMERGENCY MEDICINE | Admitting: INTERNAL MEDICINE
Payer: COMMERCIAL

## 2024-03-05 ENCOUNTER — APPOINTMENT (EMERGENCY)
Dept: CT IMAGING | Facility: HOSPITAL | Age: 44
End: 2024-03-05
Payer: COMMERCIAL

## 2024-03-05 DIAGNOSIS — R10.9 RIGHT FLANK PAIN: ICD-10-CM

## 2024-03-05 DIAGNOSIS — N20.1 URETERAL STONE: ICD-10-CM

## 2024-03-05 DIAGNOSIS — D72.829 LEUKOCYTOSIS: ICD-10-CM

## 2024-03-05 DIAGNOSIS — N13.2 HYDRONEPHROSIS WITH URINARY OBSTRUCTION DUE TO RENAL CALCULUS: Primary | ICD-10-CM

## 2024-03-05 DIAGNOSIS — N23 RENAL COLIC ON LEFT SIDE: ICD-10-CM

## 2024-03-05 DIAGNOSIS — E87.20 LACTIC ACIDOSIS: ICD-10-CM

## 2024-03-05 PROBLEM — A41.9 SEPSIS (HCC): Status: ACTIVE | Noted: 2024-03-05

## 2024-03-05 LAB
ALBUMIN SERPL BCP-MCNC: 4.1 G/DL (ref 3.5–5)
ALP SERPL-CCNC: 58 U/L (ref 34–104)
ALT SERPL W P-5'-P-CCNC: 18 U/L (ref 7–52)
ANION GAP SERPL CALCULATED.3IONS-SCNC: 10 MMOL/L
APTT PPP: 25 SECONDS (ref 23–37)
AST SERPL W P-5'-P-CCNC: 17 U/L (ref 13–39)
BACTERIA UR CULT: NORMAL
BACTERIA UR QL AUTO: ABNORMAL /HPF
BASOPHILS # BLD AUTO: 0.03 THOUSANDS/ÂΜL (ref 0–0.1)
BASOPHILS NFR BLD AUTO: 0 % (ref 0–1)
BILIRUB SERPL-MCNC: 0.32 MG/DL (ref 0.2–1)
BILIRUB UR QL STRIP: NEGATIVE
BUN SERPL-MCNC: 15 MG/DL (ref 5–25)
CALCIUM SERPL-MCNC: 9.1 MG/DL (ref 8.4–10.2)
CHLORIDE SERPL-SCNC: 102 MMOL/L (ref 96–108)
CLARITY UR: ABNORMAL
CO2 SERPL-SCNC: 26 MMOL/L (ref 21–32)
COLOR UR: YELLOW
CREAT SERPL-MCNC: 0.68 MG/DL (ref 0.6–1.3)
EOSINOPHIL # BLD AUTO: 0.1 THOUSAND/ÂΜL (ref 0–0.61)
EOSINOPHIL NFR BLD AUTO: 1 % (ref 0–6)
ERYTHROCYTE [DISTWIDTH] IN BLOOD BY AUTOMATED COUNT: 13.2 % (ref 11.6–15.1)
EXT PREGNANCY TEST URINE: NEGATIVE
EXT. CONTROL: NORMAL
GFR SERPL CREATININE-BSD FRML MDRD: 108 ML/MIN/1.73SQ M
GLUCOSE SERPL-MCNC: 108 MG/DL (ref 65–140)
GLUCOSE UR STRIP-MCNC: NEGATIVE MG/DL
HCT VFR BLD AUTO: 40.5 % (ref 34.8–46.1)
HGB BLD-MCNC: 13.6 G/DL (ref 11.5–15.4)
HGB UR QL STRIP.AUTO: ABNORMAL
IMM GRANULOCYTES # BLD AUTO: 0.07 THOUSAND/UL (ref 0–0.2)
IMM GRANULOCYTES NFR BLD AUTO: 1 % (ref 0–2)
INR PPP: 0.93 (ref 0.84–1.19)
KETONES UR STRIP-MCNC: ABNORMAL MG/DL
LACTATE SERPL-SCNC: 1.1 MMOL/L (ref 0.5–2)
LACTATE SERPL-SCNC: 1.6 MMOL/L (ref 0.5–2)
LACTATE SERPL-SCNC: 2.2 MMOL/L (ref 0.5–2)
LACTATE SERPL-SCNC: 2.3 MMOL/L (ref 0.5–2)
LEUKOCYTE ESTERASE UR QL STRIP: NEGATIVE
LYMPHOCYTES # BLD AUTO: 2.97 THOUSANDS/ÂΜL (ref 0.6–4.47)
LYMPHOCYTES NFR BLD AUTO: 23 % (ref 14–44)
MCH RBC QN AUTO: 29 PG (ref 26.8–34.3)
MCHC RBC AUTO-ENTMCNC: 33.6 G/DL (ref 31.4–37.4)
MCV RBC AUTO: 86 FL (ref 82–98)
MONOCYTES # BLD AUTO: 0.77 THOUSAND/ÂΜL (ref 0.17–1.22)
MONOCYTES NFR BLD AUTO: 6 % (ref 4–12)
MUCOUS THREADS UR QL AUTO: ABNORMAL
NEUTROPHILS # BLD AUTO: 8.95 THOUSANDS/ÂΜL (ref 1.85–7.62)
NEUTS SEG NFR BLD AUTO: 69 % (ref 43–75)
NITRITE UR QL STRIP: NEGATIVE
NON-SQ EPI CELLS URNS QL MICRO: ABNORMAL /HPF
NRBC BLD AUTO-RTO: 0 /100 WBCS
PH UR STRIP.AUTO: 6.5 [PH]
PLATELET # BLD AUTO: 339 THOUSANDS/UL (ref 149–390)
PLATELET # BLD AUTO: 370 THOUSANDS/UL (ref 149–390)
PMV BLD AUTO: 9.3 FL (ref 8.9–12.7)
PMV BLD AUTO: 9.6 FL (ref 8.9–12.7)
POTASSIUM SERPL-SCNC: 3.6 MMOL/L (ref 3.5–5.3)
PROCALCITONIN SERPL-MCNC: 0.09 NG/ML
PROT SERPL-MCNC: 6.8 G/DL (ref 6.4–8.4)
PROT UR STRIP-MCNC: ABNORMAL MG/DL
PROTHROMBIN TIME: 13.1 SECONDS (ref 11.6–14.5)
RBC # BLD AUTO: 4.69 MILLION/UL (ref 3.81–5.12)
RBC #/AREA URNS AUTO: ABNORMAL /HPF
SODIUM SERPL-SCNC: 138 MMOL/L (ref 135–147)
SP GR UR STRIP.AUTO: 1.02 (ref 1–1.03)
UROBILINOGEN UR STRIP-ACNC: <2 MG/DL
WBC # BLD AUTO: 12.89 THOUSAND/UL (ref 4.31–10.16)
WBC #/AREA URNS AUTO: ABNORMAL /HPF

## 2024-03-05 PROCEDURE — 84145 PROCALCITONIN (PCT): CPT

## 2024-03-05 PROCEDURE — 96365 THER/PROPH/DIAG IV INF INIT: CPT

## 2024-03-05 PROCEDURE — 81001 URINALYSIS AUTO W/SCOPE: CPT

## 2024-03-05 PROCEDURE — 99284 EMERGENCY DEPT VISIT MOD MDM: CPT

## 2024-03-05 PROCEDURE — 85025 COMPLETE CBC W/AUTO DIFF WBC: CPT

## 2024-03-05 PROCEDURE — 85730 THROMBOPLASTIN TIME PARTIAL: CPT

## 2024-03-05 PROCEDURE — 83605 ASSAY OF LACTIC ACID: CPT | Performed by: HOSPITALIST

## 2024-03-05 PROCEDURE — 80053 COMPREHEN METABOLIC PANEL: CPT

## 2024-03-05 PROCEDURE — 85610 PROTHROMBIN TIME: CPT

## 2024-03-05 PROCEDURE — 99223 1ST HOSP IP/OBS HIGH 75: CPT | Performed by: HOSPITALIST

## 2024-03-05 PROCEDURE — 81025 URINE PREGNANCY TEST: CPT

## 2024-03-05 PROCEDURE — 83605 ASSAY OF LACTIC ACID: CPT

## 2024-03-05 PROCEDURE — 74176 CT ABD & PELVIS W/O CONTRAST: CPT

## 2024-03-05 PROCEDURE — 36415 COLL VENOUS BLD VENIPUNCTURE: CPT

## 2024-03-05 PROCEDURE — 85049 AUTOMATED PLATELET COUNT: CPT | Performed by: HOSPITALIST

## 2024-03-05 PROCEDURE — 99291 CRITICAL CARE FIRST HOUR: CPT | Performed by: EMERGENCY MEDICINE

## 2024-03-05 PROCEDURE — 87040 BLOOD CULTURE FOR BACTERIA: CPT

## 2024-03-05 PROCEDURE — 93005 ELECTROCARDIOGRAM TRACING: CPT

## 2024-03-05 PROCEDURE — 96375 TX/PRO/DX INJ NEW DRUG ADDON: CPT

## 2024-03-05 RX ORDER — ONDANSETRON 2 MG/ML
4 INJECTION INTRAMUSCULAR; INTRAVENOUS EVERY 6 HOURS PRN
Status: DISCONTINUED | OUTPATIENT
Start: 2024-03-05 | End: 2024-03-07 | Stop reason: HOSPADM

## 2024-03-05 RX ORDER — MORPHINE SULFATE 10 MG/ML
6 INJECTION, SOLUTION INTRAMUSCULAR; INTRAVENOUS ONCE
Status: COMPLETED | OUTPATIENT
Start: 2024-03-05 | End: 2024-03-05

## 2024-03-05 RX ORDER — FAMOTIDINE 20 MG/1
20 TABLET, FILM COATED ORAL DAILY
COMMUNITY

## 2024-03-05 RX ORDER — HYDROMORPHONE HCL/PF 1 MG/ML
1 SYRINGE (ML) INJECTION ONCE
Status: DISCONTINUED | OUTPATIENT
Start: 2024-03-05 | End: 2024-03-05

## 2024-03-05 RX ORDER — TAMSULOSIN HYDROCHLORIDE 0.4 MG/1
0.4 CAPSULE ORAL
Status: DISCONTINUED | OUTPATIENT
Start: 2024-03-05 | End: 2024-03-07 | Stop reason: HOSPADM

## 2024-03-05 RX ORDER — SODIUM CHLORIDE 9 MG/ML
100 INJECTION, SOLUTION INTRAVENOUS CONTINUOUS
Status: DISCONTINUED | OUTPATIENT
Start: 2024-03-05 | End: 2024-03-05

## 2024-03-05 RX ORDER — ACETAMINOPHEN 325 MG/1
650 TABLET ORAL EVERY 6 HOURS PRN
Status: DISCONTINUED | OUTPATIENT
Start: 2024-03-05 | End: 2024-03-07 | Stop reason: HOSPADM

## 2024-03-05 RX ORDER — METFORMIN HYDROCHLORIDE EXTENDED-RELEASE TABLETS 1000 MG/1
1000 TABLET, FILM COATED, EXTENDED RELEASE ORAL
COMMUNITY

## 2024-03-05 RX ORDER — SODIUM CHLORIDE 9 MG/ML
125 INJECTION, SOLUTION INTRAVENOUS CONTINUOUS
Status: DISCONTINUED | OUTPATIENT
Start: 2024-03-05 | End: 2024-03-07 | Stop reason: HOSPADM

## 2024-03-05 RX ORDER — HYDROMORPHONE HCL/PF 1 MG/ML
1 SYRINGE (ML) INJECTION ONCE
Status: COMPLETED | OUTPATIENT
Start: 2024-03-05 | End: 2024-03-05

## 2024-03-05 RX ORDER — MORPHINE SULFATE 4 MG/ML
4 INJECTION, SOLUTION INTRAMUSCULAR; INTRAVENOUS EVERY 4 HOURS PRN
Status: DISCONTINUED | OUTPATIENT
Start: 2024-03-05 | End: 2024-03-07 | Stop reason: HOSPADM

## 2024-03-05 RX ORDER — MORPHINE SULFATE 10 MG/ML
6 INJECTION, SOLUTION INTRAMUSCULAR; INTRAVENOUS EVERY 4 HOURS PRN
Status: DISCONTINUED | OUTPATIENT
Start: 2024-03-05 | End: 2024-03-07 | Stop reason: HOSPADM

## 2024-03-05 RX ORDER — ENOXAPARIN SODIUM 100 MG/ML
40 INJECTION SUBCUTANEOUS 2 TIMES DAILY
Status: DISCONTINUED | OUTPATIENT
Start: 2024-03-05 | End: 2024-03-07 | Stop reason: HOSPADM

## 2024-03-05 RX ORDER — MORPHINE SULFATE 10 MG/ML
6 INJECTION, SOLUTION INTRAMUSCULAR; INTRAVENOUS ONCE
Status: DISCONTINUED | OUTPATIENT
Start: 2024-03-05 | End: 2024-03-07 | Stop reason: HOSPADM

## 2024-03-05 RX ADMIN — CEFTRIAXONE SODIUM 1000 MG: 10 INJECTION, POWDER, FOR SOLUTION INTRAVENOUS at 20:30

## 2024-03-05 RX ADMIN — MORPHINE SULFATE 6 MG: 10 INJECTION INTRAVENOUS at 14:37

## 2024-03-05 RX ADMIN — SODIUM CHLORIDE 125 ML/HR: 0.9 INJECTION, SOLUTION INTRAVENOUS at 20:55

## 2024-03-05 RX ADMIN — TAMSULOSIN HYDROCHLORIDE 0.4 MG: 0.4 CAPSULE ORAL at 20:16

## 2024-03-05 RX ADMIN — SODIUM CHLORIDE 1000 ML: 0.9 INJECTION, SOLUTION INTRAVENOUS at 13:33

## 2024-03-05 RX ADMIN — ENOXAPARIN SODIUM 40 MG: 40 INJECTION SUBCUTANEOUS at 20:16

## 2024-03-05 RX ADMIN — CEFTRIAXONE SODIUM 1000 MG: 10 INJECTION, POWDER, FOR SOLUTION INTRAVENOUS at 13:33

## 2024-03-05 RX ADMIN — SODIUM CHLORIDE 100 ML/HR: 0.9 INJECTION, SOLUTION INTRAVENOUS at 20:23

## 2024-03-05 RX ADMIN — HYDROMORPHONE HYDROCHLORIDE 1 MG: 1 INJECTION, SOLUTION INTRAMUSCULAR; INTRAVENOUS; SUBCUTANEOUS at 13:33

## 2024-03-05 NOTE — LETTER
[facility] 4TH FLOOR MED SURG UNIT  [address] [facility]  [place] [address]  Dept: [phone]    March 7, 2024     Patient: Mone Berumen   YOB: 1980   Date of Visit: 3/5/2024       To Whom it May Concern:    Mone Berumen is under my professional care. She was seen in the hospital from 3/5/2024 to 03/07/24. She may return to work 3/11/2024    If you have any questions or concerns, please don't hesitate to call.         Sincerely,          Helene Reinoso PA-C

## 2024-03-05 NOTE — ASSESSMENT & PLAN NOTE
"POA with R flank pain and urgency   CT shows \"5 mm calculus either at the right ureterovesical junction or within the urinary bladder with associated mild right hydronephrosis\"  Pain control, anti emetics, tamsulosin, abx as above  Strain urine   NPO at MN  Urology consult  " Pt requesting IV nausea medication.

## 2024-03-05 NOTE — ASSESSMENT & PLAN NOTE
POA with tachycardia and leukocytosis  Suspect d/t UTI   F/u bcx, ucx  Cont ceftriaxone   Cont IVF

## 2024-03-05 NOTE — H&P
"[facility]  H&P  Name: Mone Berumen 43 y.o. female I MRN: [id number]  Unit/Bed#: -01 I Date of Admission: 3/5/2024   Date of Service: 3/5/2024 I Hospital Day: 0      Assessment/Plan   * Sepsis (HCC)  Assessment & Plan  POA with tachycardia and leukocytosis  Suspect d/t UTI   F/u bcx, ucx  Cont ceftriaxone   Cont IVF     Ureteral stone  Assessment & Plan  POA with R flank pain and urgency   CT shows \"5 mm calculus either at the right ureterovesical junction or within the urinary bladder with associated mild right hydronephrosis\"  Pain control, anti emetics, tamsulosin, abx as above  Strain urine   NPO at MN  Urology consult           VTE Pharmacologic Prophylaxis: VTE Score: 3 Moderate Risk (Score 3-4) - Pharmacological DVT Prophylaxis Ordered: enoxaparin (Lovenox).  Code Status: FULL    Discussion with family: Patient declined call to .     Anticipated Length of Stay: Patient will be admitted on an observation basis with an anticipated length of stay of less than 2 midnights secondary to ureterolithiasis with mild hydronephrosis.    Total Time Spent on Date of Encounter in care of patient: 60 mins. This time was spent on one or more of the following: performing physical exam; counseling and coordination of care; obtaining or reviewing history; documenting in the medical record; reviewing/ordering tests, medications or procedures; communicating with other healthcare professionals and discussing with patient's family/caregivers.    Chief Complaint: R flank pain     History of Present Illness:  Mone Berumen is a 43 y.o. female with a PMH of nephrolithiasis who presents with right flank pain.  Patient was seen in the ED 2 days prior with similar symptoms.  At that time patient discharged home with meds for pain control and a short course of oral antibiotics.  Patient unable to take the antibiotics as she had nausea with vomiting. Pain was not well-controlled at home " prompting her return.  Today repeat CT imaging shows a distal right ureteral stone with mild right-sided hydronephrosis.  No fevers or chills but does endorse urinary frequency.     Review of Systems:  Review of Systems   Constitutional:  Negative for chills and fever.   Genitourinary:  Positive for urgency. Negative for dysuria, frequency and hematuria.   All other systems reviewed and are negative.      Past Medical and Surgical History:   Past Medical History:   Diagnosis Date    Gastroparesis     Migraine     Pseudotumor cerebri     Renal disorder     kidney stones       Past Surgical History:   Procedure Laterality Date    CARPAL TUNNEL RELEASE Right     FL RETROGRADE PYELOGRAM  6/26/2021    FL RETROGRADE PYELOGRAM  7/10/2021    FL CYSTO/URETERO W/LITHOTRIPSY &INDWELL STENT INSRT Left 6/26/2021    Procedure: CYSTOSCOPY URETEROSCOPY WITH , RETROGRADE PYELOGRAM AND INSERTION STENT URETERAL;  Surgeon: Rehan Sanchez MD;  Location: [facility];  Service: Urology    [place] CYSTO/URETERO W/LITHOTRIPSY &INDWELL STENT INSRT Left 7/10/2021    Procedure: CYSTOSCOPY, URETEROSCOPY, STONE MANIPULATION WITH BASKET EXTRACTION, RETROGRADE PYELOGRAM AND INSERTION STENT URETERAL;  Surgeon: Rehan Sanchez MD;  Location: [facility];  Service: Urology       Meds/Allergies:  Prior to Admission medications    Medication Sig Start Date End Date Taking? Authorizing Provider   aluminum-magnesium hydroxide-simethicone (MYLANTA) 200-200-20 mg/5 mL suspension Take 30 mL by mouth every 4 (four) hours as needed for indigestion or heartburn 5/6/22   Lisa Sharma MD   cefpodoxime (VANTIN) 200 mg tablet Take 1 tablet (200 mg total) by mouth 2 (two) times a day for 7 days 3/3/24 3/10/24  Ayush Escamilla DO   guaifenesin-codeine (GUAIFENESIN AC) 100-10 MG/5ML liquid TAKE 1-2 TSP BY  MOUTH EVERY 4-6 HOURS AS NEEDED FOR COUGH 12/23/21   NOE Delcid   ketorolac (TORADOL) 10 mg tablet Take 1 tablet (10 mg total) by mouth every 6 (six)  hours as needed for moderate pain for up to 5 days 3/3/24 3/8/24  Ayush Escamilla, DO   metoclopramide (REGLAN) 5 mg tablet Take 5 mg by mouth 3 (three) times a day as needed  11/25/18   Historical Provider, MD   ondansetron (ZOFRAN) 4 mg tablet Take 1 tablet (4 mg total) by mouth every 6 (six) hours 3/3/24   Ayush Escamilla DO   pantoprazole (PROTONIX) 40 mg tablet  12/20/21   Historical Provider, MD ALEXANDER have reviewed home medications with patient personally.    Allergies:   Allergies   Allergen Reactions    Coconut Oil - Food Allergy     Fentanyl Vomiting    Prednisone Rash    Wound Dressing Adhesive Rash     Adhesive tape per pt.        Social History:  Marital Status: /Civil Union   Occupation:   Patient Pre-hospital Living Situation: Home  Patient Pre-hospital Level of Mobility: walks  Patient Pre-hospital Diet Restrictions:   Substance Use History:   Social History     Substance and Sexual Activity   Alcohol Use Never     Social History     Tobacco Use   Smoking Status Never   Smokeless Tobacco Never     Social History     Substance and Sexual Activity   Drug Use Never       Family History:  History reviewed. No pertinent family history.    Physical Exam:     Vitals:   Blood Pressure: 154/97 (03/05/24 1834)  Pulse: 91 (03/05/24 1834)  Temperature: 98.5 °F (36.9 °C) (03/05/24 1834)  Temp Source: Oral (03/05/24 1834)  Respirations: 19 (03/05/24 1834)  SpO2: 100 % (03/05/24 1834)    Physical Exam  Vitals and nursing note reviewed.   Constitutional:       General: She is not in acute distress.     Appearance: Normal appearance. She is obese. She is not ill-appearing or toxic-appearing.   HENT:      Head: Normocephalic and atraumatic.   Cardiovascular:      Rate and Rhythm: Normal rate and regular rhythm.      Heart sounds: No murmur heard.     No gallop.   Pulmonary:      Effort: Pulmonary effort is normal. No respiratory distress.      Breath sounds: Normal breath sounds. No wheezing.   Abdominal:      General:  Abdomen is flat.      Palpations: Abdomen is soft.      Tenderness: There is no abdominal tenderness. There is right CVA tenderness. There is no left CVA tenderness or guarding.   Musculoskeletal:      Right lower leg: No edema.      Left lower leg: No edema.   Skin:     Coloration: Skin is not jaundiced or pale.   Neurological:      General: No focal deficit present.      Mental Status: She is alert and oriented to person, place, and time.          Additional Data:     Lab Results:  Results from last 7 days   Lab Units 03/05/24  1257 03/03/24  0630   WBC Thousand/uL 12.89* 12.78*   HEMOGLOBIN g/dL 13.6 14.0   HEMATOCRIT % 40.5 41.7   PLATELETS Thousands/uL 370 372   BANDS PCT %  --  2   NEUTROS PCT % 69  --    LYMPHS PCT % 23  --    LYMPHO PCT %  --  36   MONOS PCT % 6  --    MONO PCT %  --  5   EOS PCT % 1 3     Results from last 7 days   Lab Units 03/05/24  1257   SODIUM mmol/L 138   POTASSIUM mmol/L 3.6   CHLORIDE mmol/L 102   CO2 mmol/L 26   BUN mg/dL 15   CREATININE mg/dL 0.68   ANION GAP mmol/L 10   CALCIUM mg/dL 9.1   ALBUMIN g/dL 4.1   TOTAL BILIRUBIN mg/dL 0.32   ALK PHOS U/L 58   ALT U/L 18   AST U/L 17   GLUCOSE RANDOM mg/dL 108     Results from last 7 days   Lab Units 03/05/24  1257   INR  0.93             Results from last 7 days   Lab Units 03/05/24  1257   LACTIC ACID mmol/L 2.2*   PROCALCITONIN ng/ml 0.09       Lines/Drains:  Invasive Devices       Peripheral Intravenous Line  Duration             Peripheral IV 03/05/24 Left Antecubital <1 day                        Imaging: Reviewed radiology reports from this admission including: abdominal/pelvic CT  CT renal stone study abdomen pelvis wo contrast   Final Result by Luis Daniel Najera MD (03/05 1556)      Questionable 5 mm calculus either at the right ureterovesical junction or within the urinary bladder with associated mild right hydronephrosis.         Workstation performed: [id number]             EKG and Other Studies Reviewed on Admission:    EKG: NSR. HR 88.    ** Please Note: This note has been constructed using a voice recognition system. **

## 2024-03-05 NOTE — ED PROVIDER NOTES
History  Chief Complaint   Patient presents with    Flank Pain     Pt reports dx with kidney stone Sunday, worsening pain and pain meds she was given to take home no relief, n/v     The patient is a 43-year-old female with a past medical history of gastroparesis, migraines and recent diagnosis of nephrolithiasis of the right kidney 2 days ago who presents to the emergency department with complaint of worsening right flank pain.  Patient reports she was diagnosed with a kidney stone 2 days ago and discharged with antibiotics and a prescription for Toradol.  The patient reports that her pain has not improved since that time and has progressively worsened.  She also reports increased urinary frequency but states that very little urine comes out.  She reports that she has been experiencing chills and describes her pain as a severe sharp and stabbing pain in her right flank that radiates into her right lower abdominal region without relief.  She states that her pain is a 10 out of 10.  She denies headache, lightheadedness, change in vision, chest pain, shortness of breath, vomiting, diarrhea, numbness, tingling, weakness, hematuria, rash or fever.        Prior to Admission Medications   Prescriptions Last Dose Informant Patient Reported? Taking?   aluminum-magnesium hydroxide-simethicone (MYLANTA) 200-200-20 mg/5 mL suspension   No No   Sig: Take 30 mL by mouth every 4 (four) hours as needed for indigestion or heartburn   cefpodoxime (VANTIN) 200 mg tablet   No No   Sig: Take 1 tablet (200 mg total) by mouth 2 (two) times a day for 7 days   guaifenesin-codeine (GUAIFENESIN AC) 100-10 MG/5ML liquid   No No   Sig: TAKE 1-2 TSP BY  MOUTH EVERY 4-6 HOURS AS NEEDED FOR COUGH   ketorolac (TORADOL) 10 mg tablet   No No   Sig: Take 1 tablet (10 mg total) by mouth every 6 (six) hours as needed for moderate pain for up to 5 days   metoclopramide (REGLAN) 5 mg tablet  Self Yes No   Sig: Take 5 mg by mouth 3 (three) times a day as  needed    ondansetron (ZOFRAN) 4 mg tablet   No No   Sig: Take 1 tablet (4 mg total) by mouth every 6 (six) hours   pantoprazole (PROTONIX) 40 mg tablet   Yes No      Facility-Administered Medications: None       Past Medical History:   Diagnosis Date    Gastroparesis     Migraine     Pseudotumor cerebri     Renal disorder     kidney stones       Past Surgical History:   Procedure Laterality Date    CARPAL TUNNEL RELEASE Right     FL RETROGRADE PYELOGRAM  6/26/2021    FL RETROGRADE PYELOGRAM  7/10/2021    TN CYSTO/URETERO W/LITHOTRIPSY &INDWELL STENT INSRT Left 6/26/2021    Procedure: CYSTOSCOPY URETEROSCOPY WITH , RETROGRADE PYELOGRAM AND INSERTION STENT URETERAL;  Surgeon: Rehan Sanchez MD;  Location: [facility];  Service: Urology    TN CYSTO/URETERO W/LITHOTRIPSY &INDWELL STENT INSRT Left 7/10/2021    Procedure: CYSTOSCOPY, URETEROSCOPY, STONE MANIPULATION WITH BASKET EXTRACTION, RETROGRADE PYELOGRAM AND INSERTION STENT URETERAL;  Surgeon: Rehan Sanchez MD;  Location: [facility];  Service: Urology       History reviewed. No pertinent family history.  I have reviewed and agree with the history as documented.    E-Cigarette/Vaping    E-Cigarette Use Never User      E-Cigarette/Vaping Substances    Nicotine No     THC No     CBD No     Flavoring No     Other No     Unknown No      Social History     Tobacco Use    Smoking status: Never    Smokeless tobacco: Never   Vaping Use    Vaping status: Never Used   Substance Use Topics    Alcohol use: Never    Drug use: Never        Review of Systems   Constitutional:  Positive for chills and diaphoresis. Negative for fatigue and fever.   HENT:  Negative for congestion, ear pain and sore throat.    Eyes:  Negative for photophobia, pain and visual disturbance.   Respiratory:  Negative for cough, shortness of breath, wheezing and stridor.    Cardiovascular:  Negative for chest pain, palpitations and leg swelling.   Gastrointestinal:  Positive for abdominal pain and  nausea. Negative for abdominal distention, constipation, diarrhea and vomiting.   Endocrine: Negative.    Genitourinary:  Positive for difficulty urinating, flank pain, frequency and urgency. Negative for dysuria and hematuria.   Musculoskeletal:  Negative for arthralgias, back pain, neck pain and neck stiffness.   Skin:  Negative for color change and rash.   Allergic/Immunologic: Negative.    Neurological:  Negative for dizziness, seizures, syncope, weakness, light-headedness, numbness and headaches.   Hematological: Negative.    Psychiatric/Behavioral: Negative.     All other systems reviewed and are negative.      Physical Exam  ED Triage Vitals [03/05/24 1253]   Temperature Pulse Respirations Blood Pressure SpO2   98.4 °F (36.9 °C) 96 20 148/83 95 %      Temp Source Heart Rate Source Patient Position - Orthostatic VS BP Location FiO2 (%)   Oral Monitor Sitting Right arm --      Pain Score       10 - Worst Possible Pain             Orthostatic Vital Signs  Vitals:    03/05/24 1253   BP: 148/83   Pulse: 96   Patient Position - Orthostatic VS: Sitting       Physical Exam  Vitals and nursing note reviewed.   Constitutional:       General: She is in acute distress.      Appearance: She is well-developed. She is obese. She is ill-appearing.   HENT:      Head: Normocephalic and atraumatic.      Nose: Nose normal.      Mouth/Throat:      Mouth: Mucous membranes are moist.      Pharynx: Oropharynx is clear.   Eyes:      Extraocular Movements: Extraocular movements intact.      Conjunctiva/sclera: Conjunctivae normal.      Pupils: Pupils are equal, round, and reactive to light.   Cardiovascular:      Rate and Rhythm: Normal rate and regular rhythm.      Pulses: Normal pulses.      Heart sounds: Normal heart sounds. No murmur heard.     No friction rub.   Pulmonary:      Effort: Pulmonary effort is normal. No respiratory distress.      Breath sounds: Normal breath sounds. No stridor. No wheezing, rhonchi or rales.    Abdominal:      General: Abdomen is flat. Bowel sounds are normal. There is no distension.      Palpations: Abdomen is soft. There is no mass.      Tenderness: There is no abdominal tenderness. There is right CVA tenderness. There is no left CVA tenderness, guarding or rebound.      Hernia: No hernia is present.   Musculoskeletal:         General: No swelling or tenderness. Normal range of motion.      Cervical back: Normal range of motion and neck supple. No rigidity or tenderness.      Right lower leg: No edema.      Left lower leg: No edema.   Lymphadenopathy:      Cervical: No cervical adenopathy.   Skin:     General: Skin is warm and dry.      Capillary Refill: Capillary refill takes less than 2 seconds.      Coloration: Skin is not jaundiced or pale.      Findings: No bruising, erythema or rash.   Neurological:      General: No focal deficit present.      Mental Status: She is alert and oriented to person, place, and time. Mental status is at baseline.      Cranial Nerves: No cranial nerve deficit.      Sensory: No sensory deficit.      Motor: No weakness.   Psychiatric:         Mood and Affect: Mood normal.         ED Medications  Medications   morphine injection 6 mg (has no administration in time range)   ceftriaxone (ROCEPHIN) 1 g/50 mL in dextrose IVPB (0 mg Intravenous Stopped 3/5/24 1403)   sodium chloride 0.9 % bolus 1,000 mL (0 mL Intravenous Stopped 3/5/24 1433)   HYDROmorphone (DILAUDID) injection 1 mg (1 mg Intravenous Given 3/5/24 1333)   morphine injection 6 mg (6 mg Intravenous Given 3/5/24 1437)       Diagnostic Studies  Results Reviewed       Procedure Component Value Units Date/Time    Urine Microscopic [[id number]]  (Abnormal) Collected: 03/05/24 1333    Lab Status: Final result Specimen: Urine, Clean Catch Updated: 03/05/24 1419     RBC, UA Innumerable /hpf      WBC, UA 2-4 /hpf      Epithelial Cells Occasional /hpf      Bacteria, UA Occasional /hpf      MUCUS THREADS Occasional    Lactic  acid [[id number]]  (Abnormal) Collected: 03/05/24 1257    Lab Status: Final result Specimen: Blood from Arm, Left Updated: 03/05/24 1358     LACTIC ACID 2.2 mmol/L     Narrative:      Result may be elevated if tourniquet was used during collection.    Lactic acid 2 Hours [[id number]]     Lab Status: No result Specimen: Blood     Procalcitonin [[id number]]  (Normal) Collected: 03/05/24 1257    Lab Status: Final result Specimen: Blood from Arm, Left Updated: 03/05/24 1355     Procalcitonin 0.09 ng/ml     Comprehensive metabolic panel [[id number]] Collected: 03/05/24 1257    Lab Status: Final result Specimen: Blood from Arm, Left Updated: 03/05/24 1345     Sodium 138 mmol/L      Potassium 3.6 mmol/L      Chloride 102 mmol/L      CO2 26 mmol/L      ANION GAP 10 mmol/L      BUN 15 mg/dL      Creatinine 0.68 mg/dL      Glucose 108 mg/dL      Calcium 9.1 mg/dL      AST 17 U/L      ALT 18 U/L      Alkaline Phosphatase 58 U/L      Total Protein 6.8 g/dL      Albumin 4.1 g/dL      Total Bilirubin 0.32 mg/dL      eGFR 108 ml/min/1.73sq m     Narrative:      [employer] guidelines for Chronic Kidney Disease (CKD):     Stage 1 with normal or high GFR (GFR > 90 mL/min/1.73 square meters)    Stage 2 Mild CKD (GFR = 60-89 mL/min/1.73 square meters)    Stage 3A Moderate CKD (GFR = 45-59 mL/min/1.73 square meters)    Stage 3B Moderate CKD (GFR = 30-44 mL/min/1.73 square meters)    Stage 4 Severe CKD (GFR = 15-29 mL/min/1.73 square meters)    Stage 5 End Stage CKD (GFR <15 mL/min/1.73 square meters)  Note: GFR calculation is accurate only with a steady state creatinine    UA w Reflex to Microscopic w Reflex to Culture [[id number]]  (Abnormal) Collected: 03/05/24 1333    Lab Status: Final result Specimen: Urine, Clean Catch Updated: 03/05/24 1343     Color, UA Yellow     Clarity, UA Turbid     Specific Gravity, UA 1.022     pH, UA 6.5     Leukocytes, UA Negative     Nitrite, UA Negative     Protein, UA 30 (1+) mg/dl       Glucose, UA Negative mg/dl      Ketones, UA Trace mg/dl      Urobilinogen, UA <2.0 mg/dl      Bilirubin, UA Negative     Occult Blood, UA Large    Protime-INR [[id number]]  (Normal) Collected: 03/05/24 1257    Lab Status: Final result Specimen: Blood from Arm, Left Updated: 03/05/24 1342     Protime 13.1 seconds      INR 0.93    APTT [[id number]]  (Normal) Collected: 03/05/24 1257    Lab Status: Final result Specimen: Blood from Arm, Left Updated: 03/05/24 1342     PTT 25 seconds     CBC and differential [[id number]]  (Abnormal) Collected: 03/05/24 1257    Lab Status: Final result Specimen: Blood from Arm, Left Updated: 03/05/24 1333     WBC 12.89 Thousand/uL      RBC 4.69 Million/uL      Hemoglobin 13.6 g/dL      Hematocrit 40.5 %      MCV 86 fL      MCH 29.0 pg      MCHC 33.6 g/dL      RDW 13.2 %      MPV 9.6 fL      Platelets 370 Thousands/uL      nRBC 0 /100 WBCs      Neutrophils Relative 69 %      Immat GRANS % 1 %      Lymphocytes Relative 23 %      Monocytes Relative 6 %      Eosinophils Relative 1 %      Basophils Relative 0 %      Neutrophils Absolute 8.95 Thousands/µL      Immature Grans Absolute 0.07 Thousand/uL      Lymphocytes Absolute 2.97 Thousands/µL      Monocytes Absolute 0.77 Thousand/µL      Eosinophils Absolute 0.10 Thousand/µL      Basophils Absolute 0.03 Thousands/µL     POCT pregnancy, urine [[id number]]  (Normal) Resulted: 03/05/24 1331    Lab Status: Final result Updated: 03/05/24 1331     EXT Preg Test, Ur Negative     Control Valid    Blood culture #2 [[id number]] Collected: 03/05/24 1324    Lab Status: In process Specimen: Blood from Arm, Right Updated: 03/05/24 1327    Blood culture #1 [[id number]] Collected: 03/05/24 1257    Lab Status: In process Specimen: Blood from Arm, Left Updated: 03/05/24 1324                   CT renal stone study abdomen pelvis wo contrast   Final Result by Luis Daniel Najera MD (03/05 1556)      Questionable 5 mm calculus either at the right ureterovesical  junction or within the urinary bladder with associated mild right hydronephrosis.         Workstation performed: [id number]               Procedures  ECG 12 Lead Documentation Only    Date/Time: 3/5/2024 1:37 PM    Performed by: Eric Hebert DO  Authorized by: Eric Hebert DO    Indications / Diagnosis:  Sepsis  ECG reviewed by me, the ED Provider: yes    Patient location:  ED  Previous ECG:     Previous ECG:  Compared to current    Similarity:  No change    Comparison to cardiac monitor: No    Interpretation:     Interpretation: abnormal    Quality:     Tracing quality:  Limited by artifact  Rate:     ECG rate:  88    ECG rate assessment: normal    Rhythm:     Rhythm: sinus rhythm    Ectopy:     Ectopy: none    QRS:     QRS axis:  Normal    QRS intervals:  Normal  Conduction:     Conduction: normal    ST segments:     ST segments:  Normal  T waves:     T waves: normal    Comments:      Normal sinus rhythm and unchanged from previous tracing in December 2021.  No evidence of acute ischemia or STEMI.  Patient denies chest pain or shortness of breath at this time.        ED Course  ED Course as of 03/05/24 1622   Tue Mar 05, 2024   1344 PREGNANCY TEST URINE: Negative   1344 UA w Reflex to Microscopic w Reflex to Culture(!)  Negative leukocytes and negative nitrites with large quantities of blood.  We will await microscopic results.   1344 WBC(!): 12.89  Leukocytosis and conjunction with heart rate of 96 the patient meets SIRS criteria at this time.   1345 CBC and differential(!)  Leukocytosis noted but otherwise grossly unremarkable.   1346 Comprehensive metabolic panel  Within normal limits   1407 LACTIC ACID(!!): 2.2  Patient is receiving IV fluids at this time and we have initiated Rocephin.   1407 Procalcitonin: 0.09   1416 Patient still reports 10 out of 10 pain so we have ordered 6 mg of morphine at this time.   1435 Bacteria, UA: Occasional  Occasional bacteria noted.  The patient has already  "been on antibiotics for 2 days.  We have initiated Rocephin here in the emergency department.   1507 Patient reported pain improvement with Morphine and was able to tolerate CT imaging.   1605 CT renal stone study abdomen pelvis wo contrast  IMPRESSION:     Questionable 5 mm calculus either at the right ureterovesical junction or within the urinary bladder with associated mild right hydronephrosis.     1617 I spoke with Dr. Young who agreed to admission as observation at this time.                                       Medical Decision Making  The patient is a 43-year-old female with a past medical history of gastroparesis, migraines and recent diagnosis of nephrolithiasis of the right kidney 2 days ago who presents to the emergency department with complaint of worsening right flank pain.  Upon initial presentation the patient was alert and oriented x 4 and in acute distress secondary to flank pain.  The patient was diaphoretic and tachypneic and stated that her pain was \"unbearable\".  She had severe tenderness to palpation of the right flank and suprapubic region.  The remainder of her physical exam was grossly unremarkable.  The patient is at risk for but not limited to UTI, obstructing renal stone, hydronephrosis, pyelonephritis or sepsis.  I have ordered a full sepsis workup as well as a CT renal stone study.  I have also initiated Rocephin and started 1 L of IV fluid.  The patient has been given 1 mg of Dilaudid at this time.  Results pending.    Patient was noted to have an elevated white count and a lactic of 2.3.  She received IV Rocephin and IV fluids in the emergency department.  Pain was difficult to control and the patient received IV Dilaudid as well as morphine.  CT imaging was concerning for possible right 5 mm renal stone in the UVJ or bladder with subsequent hydronephrosis.  I spoke with Dr. Young who agreed to admission at this time.    Amount and/or Complexity of Data Reviewed  Labs: ordered. " Decision-making details documented in ED Course.  Radiology: ordered. Decision-making details documented in ED Course.    Risk  Prescription drug management.          Disposition  Final diagnoses:   Hydronephrosis with urinary obstruction due to renal calculus   Leukocytosis   Right flank pain   Lactic acidosis     Time reflects when diagnosis was documented in both MDM as applicable and the Disposition within this note       Time User Action Codes Description Comment    3/5/2024  4:21 PM Eric Hebert [N13.2] Hydronephrosis with urinary obstruction due to renal calculus     3/5/2024  4:21 PM Eric Hebert Add [D72.829] Leukocytosis     3/5/2024  4:21 PM Eric Hebert Add [R10.9] Right flank pain     3/5/2024  4:21 PM Eric Hebert Add [E87.20] Lactic acidosis           ED Disposition       ED Disposition   Admit    Condition   Stable    Date/Time   Tue Mar 5, 2024  4:22 PM    Comment   Case was discussed with Dr. Young and the patient's admission status was agreed to be Admission Status: observation status to the service of Dr. Young .               Follow-up Information    None         Patient's Medications   Discharge Prescriptions    No medications on file     No discharge procedures on file.    PDMP Review         Value Time User    PDMP Reviewed  Yes 12/23/2021  7:49 PM NOE Delcid             ED Provider  Attending physically available and evaluated Mone Berumen. I managed the patient along with the ED Attending.    Electronically Signed by           Eric Hebert DO  03/05/24 6007

## 2024-03-06 ENCOUNTER — ANESTHESIA (OUTPATIENT)
Dept: PERIOP | Facility: HOSPITAL | Age: 44
End: 2024-03-06
Payer: COMMERCIAL

## 2024-03-06 ENCOUNTER — APPOINTMENT (OUTPATIENT)
Dept: RADIOLOGY | Facility: HOSPITAL | Age: 44
End: 2024-03-06
Payer: COMMERCIAL

## 2024-03-06 ENCOUNTER — APPOINTMENT (OUTPATIENT)
Dept: ULTRASOUND IMAGING | Facility: HOSPITAL | Age: 44
End: 2024-03-06
Payer: COMMERCIAL

## 2024-03-06 ENCOUNTER — TELEPHONE (OUTPATIENT)
Dept: UROLOGY | Facility: CLINIC | Age: 44
End: 2024-03-06

## 2024-03-06 ENCOUNTER — ANESTHESIA EVENT (OUTPATIENT)
Dept: PERIOP | Facility: HOSPITAL | Age: 44
End: 2024-03-06
Payer: COMMERCIAL

## 2024-03-06 DIAGNOSIS — N20.1 URETERAL STONE: Primary | ICD-10-CM

## 2024-03-06 PROBLEM — A41.9 SEPSIS (HCC): Status: RESOLVED | Noted: 2024-03-05 | Resolved: 2024-03-06

## 2024-03-06 LAB
ATRIAL RATE: 88 BPM
GLUCOSE SERPL-MCNC: 98 MG/DL (ref 65–140)
P AXIS: 47 DEGREES
PR INTERVAL: 176 MS
QRS AXIS: 16 DEGREES
QRSD INTERVAL: 82 MS
QT INTERVAL: 366 MS
QTC INTERVAL: 442 MS
T WAVE AXIS: 28 DEGREES
VENTRICULAR RATE: 88 BPM

## 2024-03-06 PROCEDURE — 99232 SBSQ HOSP IP/OBS MODERATE 35: CPT | Performed by: PHYSICIAN ASSISTANT

## 2024-03-06 PROCEDURE — 52332 CYSTOSCOPY AND TREATMENT: CPT | Performed by: UROLOGY

## 2024-03-06 PROCEDURE — 99024 POSTOP FOLLOW-UP VISIT: CPT | Performed by: UROLOGY

## 2024-03-06 PROCEDURE — 76775 US EXAM ABDO BACK WALL LIM: CPT

## 2024-03-06 PROCEDURE — 82360 CALCULUS ASSAY QUANT: CPT | Performed by: UROLOGY

## 2024-03-06 PROCEDURE — 99223 1ST HOSP IP/OBS HIGH 75: CPT | Performed by: UROLOGY

## 2024-03-06 PROCEDURE — 93010 ELECTROCARDIOGRAM REPORT: CPT | Performed by: INTERNAL MEDICINE

## 2024-03-06 PROCEDURE — C1758 CATHETER, URETERAL: HCPCS | Performed by: UROLOGY

## 2024-03-06 PROCEDURE — 82948 REAGENT STRIP/BLOOD GLUCOSE: CPT

## 2024-03-06 PROCEDURE — 74420 UROGRAPHY RTRGR +-KUB: CPT

## 2024-03-06 PROCEDURE — C1769 GUIDE WIRE: HCPCS | Performed by: UROLOGY

## 2024-03-06 PROCEDURE — C2617 STENT, NON-COR, TEM W/O DEL: HCPCS | Performed by: UROLOGY

## 2024-03-06 PROCEDURE — 52352 CYSTOURETERO W/STONE REMOVE: CPT | Performed by: UROLOGY

## 2024-03-06 DEVICE — STENT URETERAL 6FR 22CM INLAY OPTIMA W/NITINOL GDWR: Type: IMPLANTABLE DEVICE | Site: URETER | Status: FUNCTIONAL

## 2024-03-06 RX ORDER — HYDROMORPHONE HCL IN WATER/PF 6 MG/30 ML
0.2 PATIENT CONTROLLED ANALGESIA SYRINGE INTRAVENOUS
Status: DISCONTINUED | OUTPATIENT
Start: 2024-03-06 | End: 2024-03-06 | Stop reason: HOSPADM

## 2024-03-06 RX ORDER — DIPHENHYDRAMINE HYDROCHLORIDE 50 MG/ML
INJECTION INTRAMUSCULAR; INTRAVENOUS AS NEEDED
Status: DISCONTINUED | OUTPATIENT
Start: 2024-03-06 | End: 2024-03-06

## 2024-03-06 RX ORDER — SUCCINYLCHOLINE/SOD CL,ISO/PF 100 MG/5ML
SYRINGE (ML) INTRAVENOUS AS NEEDED
Status: DISCONTINUED | OUTPATIENT
Start: 2024-03-06 | End: 2024-03-06

## 2024-03-06 RX ORDER — ACETAMINOPHEN 500 MG
500 TABLET ORAL EVERY 6 HOURS
Qty: 20 TABLET | Refills: 0 | Status: SHIPPED | OUTPATIENT
Start: 2024-03-06 | End: 2024-03-11

## 2024-03-06 RX ORDER — ONDANSETRON 2 MG/ML
INJECTION INTRAMUSCULAR; INTRAVENOUS AS NEEDED
Status: DISCONTINUED | OUTPATIENT
Start: 2024-03-06 | End: 2024-03-06

## 2024-03-06 RX ORDER — KETOROLAC TROMETHAMINE 30 MG/ML
INJECTION, SOLUTION INTRAMUSCULAR; INTRAVENOUS AS NEEDED
Status: DISCONTINUED | OUTPATIENT
Start: 2024-03-06 | End: 2024-03-06

## 2024-03-06 RX ORDER — DICLOFENAC POTASSIUM 50 MG/1
50 TABLET, FILM COATED ORAL 2 TIMES DAILY
Qty: 10 TABLET | Refills: 0 | Status: SHIPPED | OUTPATIENT
Start: 2024-03-06 | End: 2024-03-11

## 2024-03-06 RX ORDER — ACETAMINOPHEN 325 MG/1
975 TABLET ORAL EVERY 8 HOURS SCHEDULED
Status: DISCONTINUED | OUTPATIENT
Start: 2024-03-06 | End: 2024-03-07 | Stop reason: HOSPADM

## 2024-03-06 RX ORDER — METOCLOPRAMIDE 10 MG/1
10 TABLET ORAL
Status: DISCONTINUED | OUTPATIENT
Start: 2024-03-06 | End: 2024-03-07 | Stop reason: HOSPADM

## 2024-03-06 RX ORDER — ROCURONIUM BROMIDE 10 MG/ML
INJECTION, SOLUTION INTRAVENOUS AS NEEDED
Status: DISCONTINUED | OUTPATIENT
Start: 2024-03-06 | End: 2024-03-06

## 2024-03-06 RX ORDER — PHENAZOPYRIDINE HYDROCHLORIDE 100 MG/1
100 TABLET, FILM COATED ORAL
Status: DISPENSED | OUTPATIENT
Start: 2024-03-06 | End: 2024-03-07

## 2024-03-06 RX ORDER — PROPOFOL 10 MG/ML
INJECTION, EMULSION INTRAVENOUS AS NEEDED
Status: DISCONTINUED | OUTPATIENT
Start: 2024-03-06 | End: 2024-03-06

## 2024-03-06 RX ORDER — CEFAZOLIN SODIUM 2 G/50ML
SOLUTION INTRAVENOUS AS NEEDED
Status: DISCONTINUED | OUTPATIENT
Start: 2024-03-06 | End: 2024-03-06

## 2024-03-06 RX ORDER — PHENAZOPYRIDINE HYDROCHLORIDE 100 MG/1
100 TABLET, FILM COATED ORAL
Status: DISCONTINUED | OUTPATIENT
Start: 2024-03-06 | End: 2024-03-06

## 2024-03-06 RX ORDER — KETOROLAC TROMETHAMINE 30 MG/ML
15 INJECTION, SOLUTION INTRAMUSCULAR; INTRAVENOUS EVERY 6 HOURS PRN
Status: DISCONTINUED | OUTPATIENT
Start: 2024-03-06 | End: 2024-03-06

## 2024-03-06 RX ORDER — KETOROLAC TROMETHAMINE 30 MG/ML
15 INJECTION, SOLUTION INTRAMUSCULAR; INTRAVENOUS EVERY 6 HOURS PRN
Status: DISCONTINUED | OUTPATIENT
Start: 2024-03-06 | End: 2024-03-07 | Stop reason: HOSPADM

## 2024-03-06 RX ORDER — FENTANYL CITRATE 50 UG/ML
INJECTION, SOLUTION INTRAMUSCULAR; INTRAVENOUS AS NEEDED
Status: DISCONTINUED | OUTPATIENT
Start: 2024-03-06 | End: 2024-03-06

## 2024-03-06 RX ORDER — LIDOCAINE HYDROCHLORIDE 20 MG/ML
INJECTION, SOLUTION EPIDURAL; INFILTRATION; INTRACAUDAL; PERINEURAL AS NEEDED
Status: DISCONTINUED | OUTPATIENT
Start: 2024-03-06 | End: 2024-03-06

## 2024-03-06 RX ORDER — MIDAZOLAM HYDROCHLORIDE 2 MG/2ML
INJECTION, SOLUTION INTRAMUSCULAR; INTRAVENOUS AS NEEDED
Status: DISCONTINUED | OUTPATIENT
Start: 2024-03-06 | End: 2024-03-06

## 2024-03-06 RX ORDER — DIPHENHYDRAMINE HYDROCHLORIDE 50 MG/ML
12.5 INJECTION INTRAMUSCULAR; INTRAVENOUS ONCE AS NEEDED
Status: DISCONTINUED | OUTPATIENT
Start: 2024-03-06 | End: 2024-03-06 | Stop reason: HOSPADM

## 2024-03-06 RX ORDER — OXYBUTYNIN CHLORIDE 5 MG/1
5 TABLET ORAL 3 TIMES DAILY
Status: DISCONTINUED | OUTPATIENT
Start: 2024-03-06 | End: 2024-03-07 | Stop reason: HOSPADM

## 2024-03-06 RX ORDER — MAGNESIUM HYDROXIDE 1200 MG/15ML
LIQUID ORAL AS NEEDED
Status: DISCONTINUED | OUTPATIENT
Start: 2024-03-06 | End: 2024-03-06 | Stop reason: HOSPADM

## 2024-03-06 RX ORDER — SULFAMETHOXAZOLE AND TRIMETHOPRIM 800; 160 MG/1; MG/1
1 TABLET ORAL EVERY 12 HOURS SCHEDULED
Qty: 6 TABLET | Refills: 0 | Status: SHIPPED | OUTPATIENT
Start: 2024-03-06 | End: 2024-03-09

## 2024-03-06 RX ADMIN — ENOXAPARIN SODIUM 40 MG: 40 INJECTION SUBCUTANEOUS at 20:11

## 2024-03-06 RX ADMIN — METOCLOPRAMIDE 10 MG: 10 TABLET ORAL at 18:28

## 2024-03-06 RX ADMIN — SODIUM CHLORIDE 125 ML/HR: 0.9 INJECTION, SOLUTION INTRAVENOUS at 10:46

## 2024-03-06 RX ADMIN — TAMSULOSIN HYDROCHLORIDE 0.4 MG: 0.4 CAPSULE ORAL at 16:57

## 2024-03-06 RX ADMIN — ONDANSETRON 4 MG: 2 INJECTION INTRAMUSCULAR; INTRAVENOUS at 13:58

## 2024-03-06 RX ADMIN — KETOROLAC TROMETHAMINE 15 MG: 30 INJECTION, SOLUTION INTRAMUSCULAR; INTRAVENOUS at 13:58

## 2024-03-06 RX ADMIN — LIDOCAINE HYDROCHLORIDE 100 MG: 20 INJECTION, SOLUTION EPIDURAL; INFILTRATION; INTRACAUDAL; PERINEURAL at 13:49

## 2024-03-06 RX ADMIN — PHENAZOPYRIDINE 100 MG: 100 TABLET ORAL at 18:28

## 2024-03-06 RX ADMIN — ACETAMINOPHEN 975 MG: 325 TABLET, FILM COATED ORAL at 21:27

## 2024-03-06 RX ADMIN — DIPHENHYDRAMINE HYDROCHLORIDE 25 MG: 50 INJECTION, SOLUTION INTRAMUSCULAR; INTRAVENOUS at 13:49

## 2024-03-06 RX ADMIN — ROCURONIUM 30 MG: 50 INJECTION, SOLUTION INTRAVENOUS at 13:55

## 2024-03-06 RX ADMIN — OXYBUTYNIN CHLORIDE 5 MG: 5 TABLET ORAL at 20:10

## 2024-03-06 RX ADMIN — CEFTRIAXONE 2000 MG: 2 INJECTION, POWDER, FOR SOLUTION INTRAMUSCULAR; INTRAVENOUS at 10:47

## 2024-03-06 RX ADMIN — SUGAMMADEX 200 MG: 100 INJECTION, SOLUTION INTRAVENOUS at 14:14

## 2024-03-06 RX ADMIN — KETOROLAC TROMETHAMINE 15 MG: 30 INJECTION, SOLUTION INTRAMUSCULAR; INTRAVENOUS at 20:10

## 2024-03-06 RX ADMIN — MIDAZOLAM HYDROCHLORIDE 2 MG: 1 INJECTION, SOLUTION INTRAMUSCULAR; INTRAVENOUS at 13:40

## 2024-03-06 RX ADMIN — CEFAZOLIN SODIUM 2000 MG: 2 SOLUTION INTRAVENOUS at 13:57

## 2024-03-06 RX ADMIN — Medication 100 MG: at 13:49

## 2024-03-06 RX ADMIN — ACETAMINOPHEN 650 MG: 325 TABLET, FILM COATED ORAL at 10:05

## 2024-03-06 RX ADMIN — SUGAMMADEX 200 MG: 100 INJECTION, SOLUTION INTRAVENOUS at 14:21

## 2024-03-06 RX ADMIN — PROPOFOL 200 MG: 10 INJECTION, EMULSION INTRAVENOUS at 13:49

## 2024-03-06 RX ADMIN — FENTANYL CITRATE 50 MCG: 50 INJECTION INTRAMUSCULAR; INTRAVENOUS at 13:49

## 2024-03-06 RX ADMIN — ENOXAPARIN SODIUM 40 MG: 40 INJECTION SUBCUTANEOUS at 08:10

## 2024-03-06 NOTE — DISCHARGE INSTR - AVS FIRST PAGE
Mone,    Today you underwent ureteroscopy with laser lithotripsy for destruction of stones and unblocking of your kidney and ureter.  This went well.  After surgery like this it is not uncommon to have urgency and frequency of urination along with some blood in the urine.  You may also feel some discomfort in your kidney when urinating.    Please remove your stent in 5 days.  This can be done by removing the clear dressing and then pulling on the black thread to remove a long green tube.  In some cases we do leave the stent for a longer period of time and if you do not see a string coming out of your urethra and our office will call you to arrange for ureteral stent removal by way of cystoscopy and ureteral stent removal in an office setting under local anesthesia.    Please stay well-hydrated as you recover.  We have given you medications to make your recovery less bothersome.    We wish you a rapid and uneventful recovery,    Dr. Cameron

## 2024-03-06 NOTE — ASSESSMENT & PLAN NOTE
"POA with R flank pain and urgency   CT shows \"5 mm calculus either at the right ureterovesical junction or within the urinary bladder with associated mild right hydronephrosis\"  US reflecting the same   Urology consult appreciated   ?OR   Continue pain control, antiemetics, Flomax  Strain urine   "

## 2024-03-06 NOTE — CONSULTS
Consult - Urology   Mone Cousins 1980, 43 y.o. female MRN: [id number]    Unit/Bed#: -01 Encounter: [id number]      Assessment & Plan:  1.  5 mm right UVJ calculus  2.  Sepsis  -CT scan showing 5 mm calculus in either right UVJ or urinary bladder with mild right hydronephrosis.  Ultrasound today showing 5 mm right UVJ calculus, no hydro.  -Patient reports intermittent pain  -Mild leukocytosis 12  -UA leukocyte and nitrate negative.  Occasional bacteria.  Patient did not tolerate p.o. antibiotics at home.  Previous urine culture 3/3/2024 showing mixed contaminants.  On ceftriaxone  -Elevated lactic 2.3 on admission now resolved  -Vital signs stable, afebrile  -Discussed options with patient including conservative management with medical expulsive therapy versus operative intervention.   - Discussed with patient cystoscopy, R ureteroscopy, holmium laser lithotripsy, basket stone extraction, retrograde pyelogram, insertion of R ureteral stent.  Discussed risk associated with procedure including risk with anesthesia, bleeding, infection, damage to kidneys, ureter, bladder, inability to treat stone, ureteral stricture.  Discussed stent colic including frequency, urgency, hematuria, flank pain.   - Discussed stent only due to sepsis and delayed ureteroscopy at an interval  - Proceed to OR    Subjective:   Mone is a 43-year-old female with past medical history nephrolithiasis who presented to the ED with right flank pain.  Patient was seen on 3/3/2024 due to right flank pain, diagnosed with distal right ureteral calculus.  She was sent home with medical expulsive therapy and antibiosis.  Patient reports taking 1 dose of antibiotics with subsequent nausea and vomiting.  Patient reports worsening pain at home, not controlled with pain regimen.  Due to persistent pain she represented to the ED.  CT scan yesterday showing distal right ureteral stone with mild right-sided hydronephrosis.  She denies any fever,  chills, hematuria.  She reports urinary frequency.  Repeat ultrasound today showing right UVJ calculus, no hydronephrosis.  Patient reports twinges of pain coming and going.  In the ED patient had elevated lactate, now resolved.  UA negative leukocytes, no nitrates, occasional bacteria.  He had mild leukocytosis 12.  Patient reports past urologic history including the subsequent ureteroscopic interventions.  Urology was consulted for further management recommendations.    Review of Systems   Constitutional:  Negative for chills and fever.   Respiratory:  Negative for cough and shortness of breath.    Cardiovascular:  Negative for chest pain and palpitations.   Gastrointestinal:  Positive for abdominal pain. Negative for vomiting.   Genitourinary:  Negative for dysuria and hematuria.   Musculoskeletal:  Negative for arthralgias and back pain.   Skin:  Negative for color change and rash.   Neurological:  Negative for seizures and syncope.   All other systems reviewed and are negative.      Objective:  Vitals: Blood pressure 120/72, pulse 96, temperature 98.2 °F (36.8 °C), temperature source Oral, resp. rate 18, last menstrual period 03/02/2024, SpO2 94%.,There is no height or weight on file to calculate BMI.    Physical Exam  Vitals reviewed.   Constitutional:       General: She is not in acute distress.     Appearance: Normal appearance. She is obese. She is not ill-appearing, toxic-appearing or diaphoretic.   HENT:      Head: Normocephalic and atraumatic.      Right Ear: External ear normal.      Left Ear: External ear normal.      Nose: Nose normal.      Mouth/Throat:      Mouth: Mucous membranes are moist.   Eyes:      General: No scleral icterus.     Conjunctiva/sclera: Conjunctivae normal.   Cardiovascular:      Rate and Rhythm: Normal rate and regular rhythm.      Pulses: Normal pulses.      Heart sounds: Normal heart sounds. No murmur heard.     No friction rub. No gallop.   Pulmonary:      Effort: Pulmonary  effort is normal. No respiratory distress.      Breath sounds: Normal breath sounds. No stridor. No wheezing, rhonchi or rales.   Abdominal:      General: Abdomen is flat. There is no distension.      Palpations: Abdomen is soft.      Tenderness: There is no abdominal tenderness. There is no right CVA tenderness, left CVA tenderness or guarding.   Musculoskeletal:         General: Normal range of motion.      Cervical back: Normal range of motion.   Skin:     General: Skin is warm.      Findings: No rash.   Neurological:      General: No focal deficit present.      Mental Status: She is alert and oriented to person, place, and time. Mental status is at baseline.   Psychiatric:         Mood and Affect: Mood normal.         Behavior: Behavior normal.         Thought Content: Thought content normal.         Judgment: Judgment normal.         Imaging:    Narrative & Impression   CT ABDOMEN AND PELVIS WITHOUT IV CONTRAST - LOW DOSE RENAL STONE     INDICATION: concern for obstructed stone.     COMPARISON: 3/3/2024     TECHNIQUE: Low radiation dose thin section CT examination of the abdomen and pelvis was performed without intravenous or oral contrast according to a protocol specifically designed to evaluate for urinary tract calculus. Axial, sagittal, and coronal 2D   reformatted images were created from the source data and submitted for interpretation. Evaluation for pathology in the abdomen and pelvis that is unrelated to urinary tract calculi is limited.     Radiation dose length product (DLP) for this visit: 414 mGy-cm . This examination, like all CT scans performed in the [facility], was performed utilizing techniques to minimize radiation dose exposure, including the use of iterative   reconstruction and automated exposure control.     URINARY TRACT FINDINGS:     RIGHT KIDNEY AND URETER: There is persistent mild right hydronephrosis. There is a questionable 5 mm density either in the right  ureterovesical junction or in the urinary bladder (301/156).     LEFT KIDNEY AND URETER: No urinary tract calculi. No hydronephrosis or hydroureter.     URINARY BLADDER: Questionable 5 mm calculus either at the right ureterovesical junction or within the urinary bladder.        ADDITIONAL FINDINGS:     LOWER CHEST: No clinically significant abnormality in the visualized lower chest.     SOLID VISCERA: Hepatic steatosis. Otherwise, limited low radiation dose CT demonstrates no clinically significant abnormality of visualized solid abdominal viscera.     GALLBLADDER/BILIARY TREE: No calcified gallstones. No pericholecystic inflammatory change. No biliary dilation.     STOMACH AND BOWEL: Colonic diverticulosis without findings of acute diverticulitis.     APPENDIX: Normal.     ABDOMINOPELVIC CAVITY: No ascites. No pneumoperitoneum. No lymphadenopathy.     VESSELS: Unremarkable for patient's age.     REPRODUCTIVE ORGANS: Unremarkable for patient's age.     ABDOMINAL WALL/INGUINAL REGIONS: Small fat-containing umbilical hernia.     BONES: No acute fracture or suspicious osseous lesion.     IMPRESSION:     Questionable 5 mm calculus either at the right ureterovesical junction or within the urinary bladder with associated mild right hydronephrosis.        Workstation performed: [id number]     RENAL ULTRASOUND     INDICATION: assess for ureteral jets. Possible stone at R UVJ vs bladder. Mild hydro.     COMPARISON: CT renal stone study on 3/5/2024.     TECHNIQUE: Ultrasound of the retroperitoneum was performed with a curvilinear transducer utilizing volumetric sweeps and still imaging techniques.     FINDINGS:     KIDNEYS:  Symmetric and normal size.  Right kidney: 13.2 x 6.0 x 4.9 cm. Volume 205.5 mL  Left kidney: 14.1 x 5.8 x 4.5 cm. Volume 193.3 mL     Right kidney  Normal echogenicity and contour.  No mass is identified.  No hydronephrosis.  No shadowing calculi.  No perinephric fluid collections.     Left  kidney  Normal echogenicity and contour.  No mass is identified.  No hydronephrosis.  No shadowing calculi.  No perinephric fluid collections.  1 cm cyst     URETERS: Nonvisualized     BLADDER:  Normally distended.  No focal thickening or mass lesions.  Bilateral ureteral jets detected.     5 mm right ureterovesical junction calculus, consistent with the findings from the CT of the day prior.     IMPRESSION:     A 5 mm right ureterovesical junction stone is identified, consistent with the comparison CT from the day prior. Nonobstructing: Bilateral ureteral jets are identified, no right hydronephrosis.           Workstation performed: [id number]     Labs:  Recent Labs     03/05/24  1257   WBC 12.89*     Recent Labs     03/05/24  1257   HGB 13.6       Recent Labs     03/05/24  1257   CREATININE 0.68         History:  Social History     Socioeconomic History    Marital status: /Civil Union     Spouse name: None    Number of children: None    Years of education: None    Highest education level: None   Occupational History    None   Tobacco Use    Smoking status: Never    Smokeless tobacco: Never   Vaping Use    Vaping status: Never Used   Substance and Sexual Activity    Alcohol use: Never    Drug use: Never    Sexual activity: None   Other Topics Concern    None   Social History Narrative    None     Social Determinants of Health     Financial Resource Strain: Not on file   Food Insecurity: Not on file   Transportation Needs: Not on file   Physical Activity: Not on file   Stress: Not on file   Social Connections: Not on file   Intimate Partner Violence: Not on file   Housing Stability: Not on file       Past Medical History:   Diagnosis Date    Gastroparesis     Migraine     Pseudotumor cerebri     Renal disorder     kidney stones     Past Surgical History:   Procedure Laterality Date    CARPAL TUNNEL RELEASE Right     FL RETROGRADE PYELOGRAM  6/26/2021    FL RETROGRADE PYELOGRAM  7/10/2021    ME CYSTO/URETERO  W/LITHOTRIPSY &INDWELL STENT INSRT Left 6/26/2021    Procedure: CYSTOSCOPY URETEROSCOPY WITH , RETROGRADE PYELOGRAM AND INSERTION STENT URETERAL;  Surgeon: Rehan Sanchez MD;  Location: [facility];  Service: Urology    OK CYSTO/URETERO W/LITHOTRIPSY &INDWELL STENT INSRT Left 7/10/2021    Procedure: CYSTOSCOPY, URETEROSCOPY, STONE MANIPULATION WITH BASKET EXTRACTION, RETROGRADE PYELOGRAM AND INSERTION STENT URETERAL;  Surgeon: Rehan Sanchez MD;  Location: [facility];  Service: Urology     History reviewed. No pertinent family history.    Helene Reinoso PA-C  Date: 3/6/2024 Time: 11:58 AM

## 2024-03-06 NOTE — ED ATTENDING ATTESTATION
3/5/2024  I, Jason Castaneda MD, saw and evaluated the patient. I have discussed the patient with the resident/non-physician practitioner and agree with the resident's/non-physician practitioner's findings, Plan of Care, and MDM as documented in the resident's/non-physician practitioner's note, except where noted. All available labs and Radiology studies were reviewed.  I was present for key portions of any procedure(s) performed by the resident/non-physician practitioner and I was immediately available to provide assistance.    At this point I agree with the current assessment done in the Emergency Department. I have conducted an independent evaluation of this patient a history and physical is as follows:    This is a 43 y.o. old female who presents to the ED for evaluation of flank pain.  Recent diagnosis of kidney stone with UTI, started antibiotics pain control discharged home.  Since then worsening right lower flank pain.  She is on oral ketorolac without any improvement.  Pain is severe sharp 10 out of 10.  She has persistent urinary symptoms including dysuria and frequency.  No fevers though she does feel little sweaty.    Patient appears well nourished, normally developed, moderate to severe painful distress. Vital signs as documented. Head exam is atraumatic. Pupils EOMI, no scleral icterus or corneal injection noted. Neck is supple without JVD. Respirations are normal without increase work of breathing or audible noises. Cardiac exam shows regular rate and rhythm. Abdomen is soft, right lower quadrant tenderness. Patient is moving all extremities equally, able to ambulate with normal gait under own power. Patient is awake, alert, oriented ×4 without focal neurologic deficit. Skin is warm, dry, intact without rash.    A/P: This is a 43 y.o. female who presents to the ED for evaluation of abd pain.  Concern for pyelonephritis, stone.  Check labs, CAT scan, pain control reevaluate disposition  accordingly.    Update: Patient has elevated white blood cell count, broadened septic workup, empiric antibiotics given prior evidence of urinary tract infection.  IV fluids, additional analgesia for severe pain.    Lactic elevated. IVF going. Concern for severe sepsis. At this time. Will need admission. ABX Given. Will consult urology as theres a stone seen on CT.    ED Course       Critical Care Time  CriticalCare Time    Date/Time: 3/6/2024 6:31 AM    Performed by: Jason Castaneda MD  Authorized by: Jason Castaneda MD    Critical care provider statement:     Critical care time (minutes):  33    Critical care time was exclusive of:  Separately billable procedures and treating other patients and teaching time    Critical care was necessary to treat or prevent imminent or life-threatening deterioration of the following conditions:  Sepsis    Critical care was time spent personally by me on the following activities:  Obtaining history from patient or surrogate, development of treatment plan with patient or surrogate, evaluation of patient's response to treatment, examination of patient, review of old charts, re-evaluation of patient's condition, ordering and review of laboratory studies, ordering and performing treatments and interventions, ordering and review of radiographic studies and discussions with consultants

## 2024-03-06 NOTE — TELEPHONE ENCOUNTER
Status post cystoscopy and basketing of right ureteral stone.  Has a 6 Romanian by 22 cm right ureteral stent on a string.  Please have her remove this in 5 days.  She can follow-up with her outpatient urologist going forward if she likes.  If she wishes to come to the [facility] for urology please arrange for a 4-week renal ultrasound and follow-up with advanced practitioner in 6 weeks

## 2024-03-06 NOTE — PLAN OF CARE
Problem: GENITOURINARY - ADULT  Goal: Maintains or returns to baseline urinary function  Description: INTERVENTIONS:  - Assess urinary function  - Encourage oral fluids to ensure adequate hydration if ordered  - Administer IV fluids as ordered to ensure adequate hydration  - Administer ordered medications as needed  - Offer frequent toileting  - Follow urinary retention protocol if ordered  Outcome: Progressing  Goal: Absence of urinary retention  Description: INTERVENTIONS:  - Assess patient’s ability to void and empty bladder  - Monitor I/O  - Bladder scan as needed  - Discuss with physician/AP medications to alleviate retention as needed  - Discuss catheterization for long term situations as appropriate  Outcome: Progressing     Problem: SAFETY ADULT  Goal: Maintain or return to baseline ADL function  Description: INTERVENTIONS:  -  Assess patient's ability to carry out ADLs; assess patient's baseline for ADL function and identify physical deficits which impact ability to perform ADLs (bathing, care of mouth/teeth, toileting, grooming, dressing, etc.)  - Assess/evaluate cause of self-care deficits   - Assess range of motion  - Assess patient's mobility; develop plan if impaired  - Assess patient's need for assistive devices and provide as appropriate  - Encourage maximum independence but intervene and supervise when necessary  - Involve family in performance of ADLs  - Assess for home care needs following discharge   - Consider OT consult to assist with ADL evaluation and planning for discharge  - Provide patient education as appropriate  Outcome: Progressing     Problem: PAIN - ADULT  Goal: Verbalizes/displays adequate comfort level or baseline comfort level  Description: Interventions:  - Encourage patient to monitor pain and request assistance  - Assess pain using appropriate pain scale  - Administer analgesics based on type and severity of pain and evaluate response  - Implement non-pharmacological measures  as appropriate and evaluate response  - Consider cultural and social influences on pain and pain management  - Notify physician/advanced practitioner if interventions unsuccessful or patient reports new pain  Outcome: Progressing

## 2024-03-06 NOTE — OP NOTE
OPERATIVE REPORT  PATIENT NAME: Mone Berumen    :  1980  MRN: [id number]  Pt Location: AN OR ROOM 01    SURGERY DATE: 3/6/2024    Surgeons and Role:     * Chandan Cameron MD - Primary    Preop Diagnosis:  Hydronephrosis with urinary obstruction due to renal calculus [N13.2]    Post-Op Diagnosis Codes:     * Hydronephrosis with urinary obstruction due to renal calculus [N13.2]    Procedure(s):  Right - CYSTOSCOPY RETROGRADE PYELOGRAPHY WITH INTERPRETATION.  STONE REMOVAL WITH BASKETWITH INSERTION STENT URETERAL    Specimen(s):  * No specimens in log *    Estimated Blood Loss:   Minimal    Drains:  * No LDAs found *    Anesthesia Type:   Choice    Operative Indications:  Hydronephrosis with urinary obstruction due to renal calculus [N13.2]      Operative Findings:  Mild hydronephrosis on retrograde pyelography.  No pressure irrigation used today.  The distal right ureteral stone was basketed free with 1 attempt, patient by 22 cm stent was placed with a good curl proximally and distally on a string with a planned dwell time of 5 days    Complications:   None    Procedure and Technique:        PROCEDURES PERFORMED:  1) Cystoscopy  2) Right retrograde pyelography with fluoroscopic interpretation  3) Right ureteroscopy with basket extraction of stone  4) Right ureteral stent placement (6F x 22cm )    SURGEON:  Chandan Cameron MD    ASSISTANTS:  None    NOTE:  There were no qualified teaching residents to assist with this case    ANESTHESIA: Choice     COMPLICATIONS:   None    ANTIBIOTICS:  Ancef    INTRAOPERATIVE THROMBOEMBOLISM PROPHYLAXIS:  Pneumatic compression stockings       FINDINGS:    The Right calculus was not radiopaque on plain fluoroscopy.  Retrograde pyelogram was performed on the Right side using a 5 Fr open ended catheter.  Approximately 6 mL contrast was injected.    3. The following findings were noted: Mild hydronephrosis      INDICATIONS FOR PROCEDURE:  Mone Berumen is an 43 y.o. old  "female with a distal right ureteral stone, she came back to the emergency room with uncontrolled pain.  She denies fevers and chills.  Her urinalysis is not particular impressive and her urine culture showed only mixed urogenital evin and she was afebrile with stable vital signs prior to today's intervention.    After discussing the options for treatment,  the patient elected to undergo ureteroscopy and ureteral stent placement with all indicated procedures.  We discussed the procedure in detail, the alternatives, and the risks, and they signed informed consent to proceed (these are outlined in the surgical consent form).    PROCEDURE IN DETAIL:     The patient was identified by name, date of birth, and MRN  and brought to the OR.  Antibiotic prophylaxis and DVT prophylaxis were administered as per the guidelines.  They were placed in the dorsal lithotomy position with care to pad all pressure points.  They were prepped and draped in the usual sterile fashion.  A surgical time out was performed with all in the room in agreement with the correct patient, procedure, indications, and laterality.  A 21-Guamanian rigid cystoscope was used to enter the bladder.  The bladder was inspected in its entirety and there were no lesions noted.  The ureteral orifices were identified in their normal orthotopic positions.     The Right ureteral orifice was identified and a 5 Fr open ended catheter was placed into the ureteral orifice.   A retrograde pyelogram was performed with the findings as described above.  A wire was advanced up to the kidney under fluoroscopic guidance.  Leaving this safety wire in place, the bladder was drained.      A \"7.5 Fr semi-rigid ureteroscope was advanced up the ureter under vision .  No pressure irrigation was used today    The stone was encountered in the distal ureter location.  The stone was not noted to be impacted.  A 1.9 Guamanian zero tip nitinol basket was passed through the ureteroscope.  The " stone was basketed out and removed.    A 6 Fr x 22 cm JJ stent was then passed up the wire  under fluoroscopic guidance into the kidney with a good curl noted in the kidney and in the bladder.  The stent string was not removed. The bladder was drained.      The patient was placed back into the supine position, awakened from general anesthesia and brought to recovery room in stable condition.      ESTIMATED BLOOD LOSS:  Minimal      DRAINS:      SPECIMENS:   No specimens collected during this procedure.     IMPLANTS:   * No implants in log *     COMPLICATIONS: None    DISPOSITION: PACU    PLAN:  The patient is status post ureteroscopy with stone basketing. They can be discharged home later today when meeting criteria. They will remove their ureteral stent in 5 days. Our office will arrange imaging follow up and possible referral to Nephrology for medical/metabolic stone work up.      I was present for the entire procedure. and A qualified resident physician was not available.    Patient Disposition:  PACU         SIGNATURE: Chandan Cameron MD  DATE: March 6, 2024  TIME: 2:18 PM

## 2024-03-06 NOTE — ANESTHESIA POSTPROCEDURE EVALUATION
Post-Op Assessment Note    CV Status:  Stable    Pain management: adequate       Mental Status:  Awake and sleepy   Hydration Status:  Euvolemic   PONV Controlled:  Controlled   Airway Patency:  Patent  Airway: intubated  There is a medical reason for not screening for obstructive sleep apnea and/or for not using two or more mitigation strategies   Post Op Vitals Reviewed: Yes    No anethesia notable event occurred.    Staff: CRNA             /87 (03/06/24 1425)    Temp 98.9 °F (37.2 °C) (03/06/24 1425)    Pulse 89 (03/06/24 1425)   Resp   15   SpO2 98

## 2024-03-06 NOTE — UTILIZATION REVIEW
Initial Clinical Review    Admission: Date/Time/Statement: 3/5/24 1622 observation   Admission Orders (From admission, onward)       Ordered        03/05/24 1622  Place in Observation  Once                          Orders Placed This Encounter   Procedures    Place in Observation     Standing Status:   Standing     Number of Occurrences:   1     Order Specific Question:   Level of Care     Answer:   Med Surg [16]     ED Arrival Information       Expected   -    Arrival   3/5/2024 12:35    Acuity   Urgent              Means of arrival   Walk-In    Escorted by   Self    Service   Hospitalist    Admission type   Emergency              Arrival complaint   kidney stone pain             Chief Complaint   Patient presents with    Flank Pain     Pt reports dx with kidney stone Sunday, worsening pain and pain meds she was given to take home no relief, n/v       Initial Presentation: 43 y.o. female from home to ED admitted to observation due to Sepsis/Ureteral Stone.   Presented due to worsening right flank pain.  Has increased urinary frequency with little output.  + chills.   Was diagnosed with Kidney stone about 2 days ago and prescribed antibiotics and Toradol.    On exam:  acute distress.  Obese.  Appears ill.   Right CVA tenderness.   Lactic acid 2.2.   UA + protein. Large occult blood, occ. bacteria.   Wbc 12.89.  ct showed 5 mm calculus with mild right hydronephrosis.  In the ED given 1 liter IVF, started on ceftriaxone, given  2 doses of IV analgesia.   Dx/Plan:  Sepsis due to UTI/Ureteral stone:   continue ceftriaxone and IVF.    Follow cultures.   Strain urine.   Tamsulosin.  Analgesia and antiemetics as needed.  Consult Urology.     3/6/24 observation:    flank pain is improving.  Today with headache.   On exam obese.  No focal deficits.    Blood cultures in process.   Urine culture mixed contaminants.   Dx/Plan:  Sepsis/ureteral stone:  continue Rocephin and IVF.  Follow blood cultures.   Antiemetics and  analgesia as needed.   Strain urine.  Urology on consult, intervention planned.    Post Procedure 3/6/24 with stent colic and urinary frequency:  addition of scheduled tylenol, prn toradol, prn oxybutynin, prn pyridium for pain control.  On continued IVF, Ceftriaxone and Flomax     3/6/24 per Urology:  Right UVJ stone, uncontrolled pain. Recommend surgical intervention  Procedure 3/6/24: Right - CYSTOSCOPY RETROGRADE PYELOGRAPHY WITH INTERPRETATION. STONE REMOVAL WITH BASKETWITH INSERTION STENT URETERAL   Findings Mild hydronephrosis on retrograde pyelography. No pressure irrigation used today. The distal right ureteral stone was basketed free with 1 attempt, patient by 22 cm stent was placed with a good curl proximally and distally on a string with a planned dwell time of 5 days       ED Triage Vitals [03/05/24 1253]   Temperature Pulse Respirations Blood Pressure SpO2   98.4 °F (36.9 °C) 96 20 148/83 95 %      Temp Source Heart Rate Source Patient Position - Orthostatic VS BP Location FiO2 (%)   Oral Monitor Sitting Right arm --      Pain Score       10 - Worst Possible Pain          Wt Readings from Last 1 Encounters:   03/03/24 109 kg (240 lb)     Additional Vital Signs:   03/06/24 23:36:29 98.1 °F (36.7 °C) 74 16 123/70 88 92 % -- -- --   03/06/24 2300 -- -- -- -- -- -- -- None (Room air) --   03/06/24 21:29:39 -- 89 -- 150/96 114 92 % -- -- --   03/06/24 20:15:25 98.3 °F (36.8 °C) 89 -- -- -- 94 % -- -- --   03/06/24 19:01:01 97.9 °F (36.6 °C) 107 Abnormal  -- 160/101 Abnormal  121 93 % -- None (Room air) Sitting   03/06/24 16:11:02 98.3 °F (36.8 °C) 86 -- 149/104 Abnormal  119 92 % -- -- --   03/06/24 16:09:42 98.3 °F (36.8 °C) 89 -- 159/105 Abnormal  123 93 %        03/06/24 1430 -- 84 17 145/95 115 97 % -- None (Room air) --   03/06/24 1425 98.9 °F (37.2 °C) 89 18 141/87 -- 96 % 6 L/min Simple mask --   03/06/24 1301 98.3 °F (36.8 °C) 78 18 136/87 -- 97 % -- None (Room air)      03/05/24 2300 98.2 °F (36.8  °C) 96 18 120/72 91 94 % None (Room air) Sitting   03/05/24 1834 98.5 °F (36.9 °C) 91 19 154/97 120 100 % None (Room air) Sitting   03/05/24 1645 -- 96 20 -- -- 93 % None (Room air      Pertinent Labs/Diagnostic Test Results:   US kidney and bladder   Final Result by Yuri Milian MD (03/06 1018)      A 5 mm right ureterovesical junction stone is identified, consistent with the comparison CT from the day prior. Nonobstructing: Bilateral ureteral jets are identified, no right hydronephrosis.            Workstation performed: [id number]         CT renal stone study abdomen pelvis wo contrast   Final Result by Luis Daniel Najera MD (03/05 1556)      Questionable 5 mm calculus either at the right ureterovesical junction or within the urinary bladder with associated mild right hydronephrosis.         Workstation performed: [id number]         FL retrograde pyelogram    (Results Pending)     3/5/24 ecg -  Interpretation: abnormal    Quality:     Tracing quality:  Limited by artifact   Rate:     ECG rate:  88     ECG rate assessment: normal    Rhythm:     Rhythm: sinus rhythm    Ectopy:     Ectopy: none    QRS:     QRS axis:  Normal     QRS intervals:  Normal   Conduction:     Conduction: normal    ST segments:     ST segments:  Normal   T waves:     T waves: normal    Comments:      Normal sinus rhythm and unchanged from previous tracing in December 2021.  No evidence of acute ischemia or STEMI.  Patient denies chest pain   or shortness of breath at this time.     Results from last 7 days   Lab Units 03/05/24 2005 03/05/24  1257 03/03/24  0630   WBC Thousand/uL  --  12.89* 12.78*   HEMOGLOBIN g/dL  --  13.6 14.0   HEMATOCRIT %  --  40.5 41.7   PLATELETS Thousands/uL 339 370 372   NEUTROS ABS Thousands/µL  --  8.95*  --    BANDS PCT %  --   --  2     Results from last 7 days   Lab Units 03/05/24  1257 03/03/24  0630   SODIUM mmol/L 138 139   POTASSIUM mmol/L 3.6 4.2   CHLORIDE mmol/L 102 104   CO2 mmol/L 26 23    ANION GAP mmol/L 10 12   BUN mg/dL 15 20   CREATININE mg/dL 0.68 0.69   EGFR ml/min/1.73sq m 108 108   CALCIUM mg/dL 9.1 9.1     Results from last 7 days   Lab Units 03/05/24  1257 03/03/24  0630   AST U/L 17 15   ALT U/L 18 17   ALK PHOS U/L 58 64   TOTAL PROTEIN g/dL 6.8 6.8   ALBUMIN g/dL 4.1 4.0   TOTAL BILIRUBIN mg/dL 0.32 0.29     Results from last 7 days   Lab Units 03/05/24  1257 03/03/24  0630   GLUCOSE RANDOM mg/dL 108 129     Results from last 7 days   Lab Units 03/05/24  1257   PROTIME seconds 13.1   INR  0.93   PTT seconds 25     Results from last 7 days   Lab Units 03/05/24  1257   PROCALCITONIN ng/ml 0.09     Results from last 7 days   Lab Units 03/05/24  2322 03/05/24 2005 03/05/24  1815 03/05/24  1257   LACTIC ACID mmol/L 1.6 2.3* 1.1 2.2*     Results from last 7 days   Lab Units 03/03/24  0630   LIPASE u/L 25     Results from last 7 days   Lab Units 03/05/24  1333 03/03/24  0720   CLARITY UA  Turbid Turbid   COLOR UA  Yellow Light Orange   SPEC GRAV UA  1.022 1.022   PH UA  6.5 6.0   GLUCOSE UA mg/dl Negative Negative   KETONES UA mg/dl Trace* Negative   BLOOD UA  Large* Large*   PROTEIN UA mg/dl 30 (1+)* 30 (1+)*   NITRITE UA  Negative Negative   BILIRUBIN UA  Negative Negative   UROBILINOGEN UA (BE) mg/dl <2.0 <2.0   LEUKOCYTES UA  Negative Small*   WBC UA /hpf 2-4* Innumerable*   RBC UA /hpf Innumerable* Innumerable*   BACTERIA UA /hpf Occasional Occasional   EPITHELIAL CELLS WET PREP /hpf Occasional Occasional   MUCUS THREADS  Occasional* Moderate*     Results from last 7 days   Lab Units 03/05/24  1324 03/05/24  1257 03/03/24  0720   BLOOD CULTURE  No Growth at 24 hrs. No Growth at 24 hrs.  --    URINE CULTURE   --   --  50,000-59,000 cfu/ml       ED Treatment:   Medication Administration from 03/05/2024 1235 to 03/05/2024 1824         Date/Time Order Dose Route Action Comments     03/05/2024 1333 EST ceftriaxone (ROCEPHIN) 1 g/50 mL in dextrose IVPB 1,000 mg Intravenous New Bag --      03/05/2024 1333 EST sodium chloride 0.9 % bolus 1,000 mL 1,000 mL Intravenous New Bag --     03/05/2024 1333 EST HYDROmorphone (DILAUDID) injection 1 mg 1 mg Intravenous Given --     03/05/2024 1437 EST morphine injection 6 mg 6 mg Intravenous Given --          Past Medical History:   Diagnosis Date    Gastroparesis     Migraine     Pseudotumor cerebri     Renal disorder     kidney stones     Present on Admission:   (Resolved) Sepsis (HCC)   Ureteral stone      Admitting Diagnosis: Lactic acidosis [E87.20]  Leukocytosis [D72.829]  Right flank pain [R10.9]  Hydronephrosis with urinary obstruction due to renal calculus [N13.2]  Age/Sex: 43 y.o. female  Admission Orders:  Scheduled Medications:  cefTRIAXone, 2,000 mg, Intravenous, Q24H  enoxaparin, 40 mg, Subcutaneous, BID  morphine injection, 6 mg, Intravenous, Once  tamsulosin, 0.4 mg, Oral, Daily With Dinner    ceftriaxone (ROCEPHIN) 1 g/50 mL in dextrose IVPB  Dose: 1,000 mg  Freq: Once Route: IV  Last Dose: Stopped (03/06/24 0804)  Start: 03/05/24 2000 End: 03/06/24 0804     acetaminophen (TYLENOL) tablet 975 mg  Dose: 975 mg  Freq: Every 8 hours scheduled Route: PO  Start: 03/06/24 2200   metoclopramide (REGLAN) tablet 10 mg  Dose: 10 mg  Freq: 3 times daily before meals Route: PO  Start: 03/06/24 1800     oxybutynin (DITROPAN) tablet 5 mg  Dose: 5 mg  Freq: 3 times daily Route: PO  Start: 03/06/24 1915   phenazopyridine (PYRIDIUM) tablet 100 mg  Dose: 100 mg  Freq: 3 times daily with meals Route: PO  Start: 03/06/24 1915 End: 03/07/24 1159     Continuous IV Infusions:  sodium chloride, 125 mL/hr, Intravenous, Continuous      PRN Meds: not used.   acetaminophen, 650 mg, Oral, Q6H PRN  ketorolac, 15 mg, Intravenous, Q6H PRN x 1 3/6/24  morphine injection, 4 mg, Intravenous, Q4H PRN  morphine injection, 6 mg, Intravenous, Q4H PRN  ondansetron, 4 mg, Intravenous, Q6H PRN    Strain all urine     IP CONSULT TO UROLOGY    Network Utilization Review  Department  ATTENTION: Please call with any questions or concerns to [phone] and carefully listen to the prompts so that you are directed to the right person. All voicemails are confidential.   For Discharge needs, contact Care Management DC Support Team at [phone] opt. [phone]  Send all requests for admission clinical reviews, approved or denied determinations and any other requests to dedicated fax number below belonging to the campus where the patient is receiving treatment. List of dedicated fax numbers for the Facilities:  FACILITY NAME UR FAX NUMBER   ADMISSION DENIALS (Administrative/Medical Necessity) [phone]   DISCHARGE SUPPORT TEAM (NETWORK) [phone]   PARENT CHILD HEALTH (Maternity/NICU/Pediatrics) [phone]   [facility] [phone]   [facility] [phone]   [facility] [phone]   [facility] [phone]   [facility] [phone]   [facility] [phone]   [facility] [phone]   [facility] [phone]   [facility] [phone]   [facility] [phone]   [facility] [phone]   [facility] [phone]

## 2024-03-06 NOTE — ASSESSMENT & PLAN NOTE
POA with tachycardia and leukocytosis. Due to UTI vs stone itself   Prior UCx with mixed contaminants   Blood culture pending   Continue Rocephin  Continue IVF   Follow blood cultures

## 2024-03-06 NOTE — ANESTHESIA PREPROCEDURE EVALUATION
Procedure:  CYSTOSCOPY RETROGRADE PYELOGRAM WITH INSERTION STENT URETERAL (Right: Bladder)    Relevant Problems   CARDIO   (+) Migraines      NEURO/PSYCH   (+) Migraines      PULMONARY   (+) Sleep apnea        Minimal gastroesophageal reflux.   Paucity of peristalsis of stomach, may reflect patient's known history of   gastroparesis. No significant delay of gastric emptying into duodenum.         Left Ventricle   Left ventricle is normal in size. Wall thickness is normal. Systolic function is normal with an ejection fraction of 60-65%. Wall motion is within normal limits. There is normal diastolic function.     Right Ventricle   Right ventricle cavity is normal. Systolic function is normal.     Left Atrium   Left atrium cavity size is normal.     Right Atrium   Right atrium cavity is normal.     IVC/SVC   The inferior vena cava demonstrates a diameter of <=21 mm and collapses >50%; therefore, the right atrial pressure is estimated at 0-5 mmHg.     Mitral Valve   Mitral valve structure is normal. There is no regurgitation or stenosis.     Tricuspid Valve   Tricuspid valve structure is normal. There is no regurgitation or stenosis.     Aortic Valve   The aortic valve is probably trileaflet. There is trace regurgitation. There is no evidence of aortic valve stenosis.     Pulmonic Valve   The pulmonic valve was not well visualized. There is no regurgitation or stenosis. The estimated pulmonary artery systolic pressure is 16.2 mmHg.     Ascending Aorta   The aorta appears normal in size.     Pericardium   There is no pericardial effusion.         Physical Exam    Airway    Mallampati score: II  TM Distance: >3 FB  Neck ROM: full     Dental       Cardiovascular  Cardiovascular exam normal    Pulmonary  Pulmonary exam normal     Other Findings  post-pubertal.    Gastroparesis    Pseudotumor cerebri    Migraine    Renal disorder kidney stones     Anesthesia Plan  ASA Score- 2 Emergent    Anesthesia Type- general with ASA  Monitors.         Additional Monitors:     Airway Plan: ETT.           Plan Factors-Exercise tolerance (METS): >4 METS.    Chart reviewed. EKG reviewed. Imaging results reviewed. Existing labs reviewed. Patient summary reviewed.                  Induction- intravenous.    Postoperative Plan- Plan for postoperative opioid use. Planned trial extubation    Informed Consent- Anesthetic plan and risks discussed with patient.  I personally reviewed this patient with the CRNA. Discussed and agreed on the Anesthesia Plan with the CRNA..

## 2024-03-07 VITALS
DIASTOLIC BLOOD PRESSURE: 96 MMHG | SYSTOLIC BLOOD PRESSURE: 144 MMHG | TEMPERATURE: 98.2 F | RESPIRATION RATE: 18 BRPM | HEART RATE: 75 BPM | OXYGEN SATURATION: 94 %

## 2024-03-07 DIAGNOSIS — N20.1 URETERAL STONE: Primary | ICD-10-CM

## 2024-03-07 PROCEDURE — 99239 HOSP IP/OBS DSCHRG MGMT >30: CPT | Performed by: PHYSICIAN ASSISTANT

## 2024-03-07 PROCEDURE — 99232 SBSQ HOSP IP/OBS MODERATE 35: CPT

## 2024-03-07 RX ORDER — CYCLOBENZAPRINE HCL 5 MG
5 TABLET ORAL
Qty: 30 TABLET | Refills: 0 | Status: SHIPPED | OUTPATIENT
Start: 2024-03-07

## 2024-03-07 RX ORDER — OXYBUTYNIN CHLORIDE 5 MG/1
5 TABLET ORAL 3 TIMES DAILY
Qty: 12 TABLET | Refills: 0 | Status: SHIPPED | OUTPATIENT
Start: 2024-03-07 | End: 2024-03-11

## 2024-03-07 RX ORDER — TAMSULOSIN HYDROCHLORIDE 0.4 MG/1
0.4 CAPSULE ORAL
Qty: 14 CAPSULE | Refills: 0 | Status: SHIPPED | OUTPATIENT
Start: 2024-03-07

## 2024-03-07 RX ORDER — PHENAZOPYRIDINE HYDROCHLORIDE 100 MG/1
100 TABLET, FILM COATED ORAL 3 TIMES DAILY PRN
Qty: 6 TABLET | Refills: 0 | Status: SHIPPED | OUTPATIENT
Start: 2024-03-07 | End: 2024-03-10

## 2024-03-07 RX ORDER — GABAPENTIN 300 MG/1
300 CAPSULE ORAL
Qty: 30 CAPSULE | Refills: 0 | Status: SHIPPED | OUTPATIENT
Start: 2024-03-07

## 2024-03-07 RX ADMIN — ENOXAPARIN SODIUM 40 MG: 40 INJECTION SUBCUTANEOUS at 10:15

## 2024-03-07 RX ADMIN — CEFTRIAXONE 2000 MG: 2 INJECTION, POWDER, FOR SOLUTION INTRAMUSCULAR; INTRAVENOUS at 13:09

## 2024-03-07 RX ADMIN — PHENAZOPYRIDINE 100 MG: 100 TABLET ORAL at 10:15

## 2024-03-07 RX ADMIN — OXYBUTYNIN CHLORIDE 5 MG: 5 TABLET ORAL at 10:15

## 2024-03-07 RX ADMIN — ACETAMINOPHEN 975 MG: 325 TABLET, FILM COATED ORAL at 13:10

## 2024-03-07 RX ADMIN — ACETAMINOPHEN 975 MG: 325 TABLET, FILM COATED ORAL at 05:27

## 2024-03-07 RX ADMIN — METOCLOPRAMIDE 10 MG: 10 TABLET ORAL at 10:15

## 2024-03-07 NOTE — PROGRESS NOTES
Progress Note - Urology  Mone Rajansins 1980, 43 y.o. female MRN: [id number]    Unit/Bed#: S -01 Encounter: [id number]      Assessment & Plan:  1.  5 mm right UVJ calculus  2.  Sepsis  - S/p cystoscopy, retrograde pyelogram, stone removal with basket, insertion of right ureteral stent with string  -Stent discomfort overnight.  Worse with urination.  -Confusion regarding when to remove stent.  Discussed recommendation for 5 days.  However patient unable to tolerate stent.  She will remove Sunday, wife to help her at home. If wife is unable, she will require nursing visit at office for stent removal  -Vital signs stable, afebrile  -Stable for discharge today.  -Reviewed stent colic medications.  -Urology will sign off at this time.  Please do not hesitate to reach out with any questions or concerns    Subjective:   HPI: Seen at bedside.  Reports confusion about when to review remove stent.  Reviewed expectations with stent, medication sent to pharmacy.  Plan for stent removal Sunday    Review of Systems   Constitutional:  Negative for chills and fever.   Respiratory:  Negative for cough and shortness of breath.    Cardiovascular:  Negative for chest pain and palpitations.   Gastrointestinal:  Positive for abdominal pain. Negative for vomiting.   Genitourinary:  Positive for flank pain. Negative for dysuria and hematuria.   Musculoskeletal:  Negative for arthralgias and back pain.   Skin:  Negative for color change and rash.   Neurological:  Negative for seizures and syncope.   All other systems reviewed and are negative.      Objective:    Vitals: Blood pressure 144/96, pulse 75, temperature 98.2 °F (36.8 °C), resp. rate 18, last menstrual period 03/02/2024, SpO2 94%.,There is no height or weight on file to calculate BMI.    Physical Exam  Vitals reviewed.   Constitutional:       General: She is not in acute distress.     Appearance: Normal appearance. She is normal weight. She is not ill-appearing,  toxic-appearing or diaphoretic.   HENT:      Head: Normocephalic and atraumatic.      Right Ear: External ear normal.      Left Ear: External ear normal.      Nose: Nose normal.      Mouth/Throat:      Mouth: Mucous membranes are moist.   Eyes:      General: No scleral icterus.     Conjunctiva/sclera: Conjunctivae normal.   Cardiovascular:      Rate and Rhythm: Normal rate.      Pulses: Normal pulses.   Pulmonary:      Effort: Pulmonary effort is normal.   Abdominal:      General: Abdomen is flat. There is no distension.      Palpations: Abdomen is soft.      Tenderness: There is no abdominal tenderness. There is no guarding.   Musculoskeletal:         General: Normal range of motion.      Cervical back: Normal range of motion.   Skin:     General: Skin is warm.      Findings: No rash.   Neurological:      General: No focal deficit present.      Mental Status: She is alert and oriented to person, place, and time. Mental status is at baseline.   Psychiatric:         Mood and Affect: Mood normal.         Behavior: Behavior normal.         Thought Content: Thought content normal.         Judgment: Judgment normal.         Labs:  Recent Labs     03/05/24  1257   WBC 12.89*       Recent Labs     03/05/24  1257   HGB 13.6     Recent Labs     03/05/24  1257   HCT 40.5     Recent Labs     03/05/24  1257   CREATININE 0.68       History:    Past Medical History:   Diagnosis Date    Gastroparesis     Migraine     Pseudotumor cerebri     Renal disorder     kidney stones     Social History     Socioeconomic History    Marital status: /Civil Union     Spouse name: None    Number of children: None    Years of education: None    Highest education level: None   Occupational History    None   Tobacco Use    Smoking status: Never    Smokeless tobacco: Never   Vaping Use    Vaping status: Never Used   Substance and Sexual Activity    Alcohol use: Never    Drug use: Never    Sexual activity: None   Other Topics Concern    None    Social History Narrative    None     Social Determinants of Health     Financial Resource Strain: Not on file   Food Insecurity: Not on file   Transportation Needs: Not on file   Physical Activity: Not on file   Stress: Not on file   Social Connections: Not on file   Intimate Partner Violence: Not on file   Housing Stability: Not on file     Past Surgical History:   Procedure Laterality Date    CARPAL TUNNEL RELEASE Right     FL RETROGRADE PYELOGRAM  6/26/2021    FL RETROGRADE PYELOGRAM  7/10/2021    NC CYSTO BLADDER W/URETERAL CATHETERIZATION Right 3/6/2024    Procedure: CYSTOSCOPY RETROGRADE PYELOGRAPHY WITH INTERPRETATION,  STONE REMOVAL WITH BASKETWITH INSERTION STENT URETERAL;  Surgeon: Chandan Cameron MD;  Location:  Main OR;  Service: Urology    NC CYSTO/URETERO W/LITHOTRIPSY &INDWELL STENT INSRT Left 6/26/2021    Procedure: CYSTOSCOPY URETEROSCOPY WITH , RETROGRADE PYELOGRAM AND INSERTION STENT URETERAL;  Surgeon: Rehan Sanchez MD;  Location: [facility];  Service: Urology    NC CYSTO/URETERO W/LITHOTRIPSY &INDWELL STENT INSRT Left 7/10/2021    Procedure: CYSTOSCOPY, URETEROSCOPY, STONE MANIPULATION WITH BASKET EXTRACTION, RETROGRADE PYELOGRAM AND INSERTION STENT URETERAL;  Surgeon: Rehan Sanchez MD;  Location: [facility];  Service: Urology     History reviewed. No pertinent family history.    Helene Reinoso PA-C  Date: 3/7/2024 Time: 1:02 PM

## 2024-03-07 NOTE — TELEPHONE ENCOUNTER
I would recommend maintaining stent as advised for 5 days. I sent over stent colic medications Gabapentin and Flexiril to pharmacy to be taken with the oxybutynin, pyridium, and flomax.

## 2024-03-07 NOTE — ASSESSMENT & PLAN NOTE
"POA with R flank pain and urgency   CT shows \"5 mm calculus either at the right ureterovesical junction or within the urinary bladder with associated mild right hydronephrosis\"  US reflecting the same   Status post cysto and basketing of ureteral stone   Stable for discharge   Urology consult appreciated   Continue pain control, antiemetics, Flomax  Remove stent in 5 days   "

## 2024-03-07 NOTE — TELEPHONE ENCOUNTER
Pt called stated that she was told she needs an appt and and US in 5-6 weeks     There is no order for US and nothing available for a visit in 5-6 weeks     Please review

## 2024-03-07 NOTE — DISCHARGE SUMMARY
"[facility]  Discharge- Mone Cousins 1980, 43 y.o. female MRN: [id number]  Unit/Bed#: S -01 Encounter: [id number]  Primary Care Provider: Ethan Mora DO   Date and time admitted to hospital: 3/5/2024 12:47 PM    * Ureteral stone  Assessment & Plan  POA with R flank pain and urgency   CT shows \"5 mm calculus either at the right ureterovesical junction or within the urinary bladder with associated mild right hydronephrosis\"  US reflecting the same   Status post cysto and basketing of ureteral stone   Stable for discharge   Urology consult appreciated   Continue pain control, antiemetics, Flomax  Remove stent in 5 days         Medical Problems       Resolved Problems  Date Reviewed: 3/7/2024            Resolved    Sepsis (HCC) 3/6/2024     Resolved by  Cruzito Hyde PA-C        Discharging Physician / Practitioner: Cruzito Hyde PA-C  PCP: Ethan Mora DO  Admission Date:   Admission Orders (From admission, onward)       Ordered        03/05/24 1622  Place in Observation  Once                          Discharge Date: 03/07/24    Consultations During Hospital Stay:  Urology - Dr. Cameron    Procedures Performed:   CT renal stone with abdomen pelvis   US Kidney and Bladder  Right Cystoscopy retrograde pyelography with interpretation. Stone removal with basket with insertion stent ureteral     Significant Findings / Test Results:   CT renal stone with abdomen pelvis: Questionable 5 mm calculus either at the right ureterovesical junction or within the urinary bladder with associated mild right hydronephrosis   US Kidney and Bladder: A 5 mm right ureterovesical junction stone is identified, consistent with the comparison from the day prior. Nonobstructing. Bilateral ureteral jets are identified, no right hydronephrosis.   Right Cystoscopy retrograde pyelography with interpretation. Stone removal with basket with insertion stent ureteral - see op note for details "     Incidental Findings:   None     Test Results Pending at Discharge (will require follow up):   None     Outpatient Tests Requested:  None    Complications:  None    Reason for Admission: Ureteral Colic     Hospital Course:   Mone Berumen is a 43 y.o. female patient who originally presented to the hospital on 3/5/2024 due to flank pain. Initial CT scan in the ER was suspicious for 5 mm right ureteral stone. She was admitted, hydrated, and given she met sepsis criteria she was started on IV Rocephin. Prior urine culture was negative. She underwent US which confirmed stone. She was taken for cystoscopy with basket retrieval of stone and stent was placed. She was have stent discomfort post-operative, but otherwise doing well. She will remove her stent at home on Sunday and follow up with Urology subsequently.         Please see above list of diagnoses and related plan for additional information.     Condition at Discharge: stable    Discharge Day Visit / Exam:   Subjective:  Patient reports some discomfort from the stent and she won't be able to take it out herself. Urology discussed with patient and then she was satisfied with plan.   Vitals: Blood Pressure: 144/96 (03/07/24 1130)  Pulse: 75 (03/07/24 1130)  Temperature: 98.2 °F (36.8 °C) (03/07/24 1130)  Temp Source: Oral (03/06/24 1901)  Respirations: 18 (03/07/24 1130)  SpO2: 94 % (03/07/24 1130)  Exam:   Physical Exam  Constitutional:       General: She is not in acute distress.     Appearance: Normal appearance. She is obese. She is not ill-appearing or diaphoretic.   HENT:      Head: Normocephalic and atraumatic.      Mouth/Throat:      Mouth: Mucous membranes are moist.   Eyes:      General: No scleral icterus.     Pupils: Pupils are equal, round, and reactive to light.   Cardiovascular:      Rate and Rhythm: Normal rate and regular rhythm.      Pulses: Normal pulses.      Heart sounds: Normal heart sounds, S1 normal and S2 normal. No murmur heard.     No  systolic murmur is present.      No diastolic murmur is present.      No gallop. No S3 or S4 sounds.   Pulmonary:      Effort: Pulmonary effort is normal. No accessory muscle usage or respiratory distress.      Breath sounds: Normal breath sounds. No stridor. No decreased breath sounds, wheezing, rhonchi or rales.   Chest:      Chest wall: No tenderness.   Abdominal:      General: Bowel sounds are normal. There is no distension.      Palpations: Abdomen is soft.      Tenderness: There is no abdominal tenderness. There is no guarding.   Musculoskeletal:      Right lower leg: No edema.      Left lower leg: No edema.   Skin:     General: Skin is warm and dry.      Coloration: Skin is not jaundiced.   Neurological:      General: No focal deficit present.      Mental Status: She is alert. Mental status is at baseline.      Motor: No tremor or seizure activity.   Psychiatric:         Behavior: Behavior is cooperative.          Discussion with Family: Patient declined call to .     Discharge instructions/Information to patient and family:   See after visit summary for information provided to patient and family.      Provisions for Follow-Up Care:  See after visit summary for information related to follow-up care and any pertinent home health orders.      Mobility at time of Discharge:   Basic Mobility Inpatient Raw Score: 24  JH-HLM Goal: 8: Walk 250 feet or more  JH-HLM Achieved: 8: Walk 250 feet ot more  HLM Goal achieved. Continue to encourage appropriate mobility.     Disposition:   Home    Planned Readmission: None     Discharge Statement:  I spent 45 minutes discharging the patient. This time was spent on the day of discharge. I had direct contact with the patient on the day of discharge. Greater than 50% of the total time was spent examining patient, answering all patient questions, arranging and discussing plan of care with patient as well as directly providing post-discharge instructions.  Additional  time then spent on discharge activities.    Discharge Medications:  See after visit summary for reconciled discharge medications provided to patient and/or family.      **Please Note: This note may have been constructed using a voice recognition system**

## 2024-03-07 NOTE — PROGRESS NOTES
"43 year old female underwent cystoscopy ureteral stone basket extraction, ureteral stent insertion. has stent on string goal of 5 days.  called and spoke with the patient she is having tremendous stent colic and urinary frequency. she is afraid she might pull the stent out. she did not know it was going to have a string. she does not think she can tolerate this for five days. i reviewed her operative findings, stent plan and reasoning, pain regimen, and followup arrangements (stent on string removal in our office in 5 days) then US and KUB in 6-8 weeks she wishes to return to her primary urology provider at [facility] for that. For tonight i have added scheduled tylenol, prn toradol, prn oxybutynin, prn pyridium to her existing pain regimen. She is comfortable with the plan. Reviewed typical home stent rx plan as below in addition to the flomax she has been taking.        Recommended \"Stent colic\" Symptom management:  Acetaminophen (Tylenol) 650mg q6h prn  Ibuprofen (Advil, Motrin) 400mg q6h prn  Hydrate with free water minimum 60 oz+ per day (good goal is about 4 standard water bottles)  Oxybutynin (Ditropan) 5mg q8h prn is a prescription medicine for urinary urgency/bladder spasms/overactive and is useful in combatting stent symptoms. I sent a prescription to your pharmacy if you feel you need it, but is \"as needed\" not absolutely necessary especially if you feel OK without. This medication can cause constipation and dryness (mouth, eyes).       "

## 2024-03-07 NOTE — TELEPHONE ENCOUNTER
Post Op Note    Mone Berumen is a 43 y.o. female s/p CYSTOSCOPY RETROGRADE PYELOGRAPHY WITH INTERPRETATION,  STONE REMOVAL WITH BASKETWITH INSERTION STENT URETERAL (Right: Bladder)  performed 3/6/24.  Mone Berumen is a patient of Dr. Dr. Cameron and has not been seen in office     Patient is still in hospital which I was unaware of at time of call    Went over expected s/s of a stent. Attempted to discuss stent removal. She states she cannot withstand 5 days of the stent. Additionally cannot remove at home on her own. She said she called and spoke with someone last night and they said that It could be removed tomorrow. I do not see an encounter for this but advised I would look into this. She is going to speak with her wife and see if her wife would be able to remove it at home for her in lieu of coming to office. Otherwise she would need an office visit

## 2024-03-08 ENCOUNTER — TELEPHONE (OUTPATIENT)
Dept: UROLOGY | Facility: AMBULATORY SURGERY CENTER | Age: 44
End: 2024-03-08

## 2024-03-08 NOTE — TELEPHONE ENCOUNTER
Advised patient that we would like the stent in for 5 days but if that can't be tolerated then Sunday would be ok per the discharge note from sundar. She reports her wife will take it out on Sunday. I will call next week to ensure removal.     Patient wishes to have post surgical follow up with out office and then will go back to [facility]. Scheduled appt with AP and also ordered US. Provided CS number.

## 2024-03-08 NOTE — TELEPHONE ENCOUNTER
FMLA came through [employer] and pt had surgery with Dr Cameron Emailed paperwork to Adela[employer] Maynard and adding Keri Flores in to add the charge.

## 2024-03-10 LAB
BACTERIA BLD CULT: NORMAL
BACTERIA BLD CULT: NORMAL

## 2024-03-11 NOTE — TELEPHONE ENCOUNTER
PT would like to go back to work today from procedure last week.  PT has a form from work that needs to be filled out. She will be stopping by the office this morning to drop off the form to be filled out so she can get this form filled out so she can go back to work.

## 2024-03-11 NOTE — TELEPHONE ENCOUNTER
PT DROPPED OFF 1 PAGE FORM TO BE COMPLETED SO SHE CAN GO BACK TO WORK. IT WAS EMAILED TO LM FOR COMPLETION. PT WANTS TO BE CALLED WHEN COMPLETED([phone]). NO CHARGE FOR THIS FORM SINCE CHARGE WAS ALREADY MADE FOR [facility] PAPERWORK. SHE WAS HOPING IT CAN BE DONE TODAY BECAUSE SHE SAID IT ONLY NEEDS ON BOX CHECKED AND SIGNED. I TOLD HER NO PROMISES BUT I WILL ADVISE LM.

## 2024-03-13 LAB
COLOR STONE: NORMAL
COMMENT-STONE3: NORMAL
COMPOSITION: NORMAL
LABORATORY COMMENT REPORT: NORMAL
PHOTO: NORMAL
SIZE STONE: NORMAL MM
SPEC SOURCE SUBJ: NORMAL
STONE ANALYSIS-IMP: NORMAL
STONE ANALYSIS-IMP: NORMAL
URATE MFR STONE: 100 %
WT STONE: 18 MG

## 2024-03-13 NOTE — TELEPHONE ENCOUNTER
[employer] paperwork for [employer] faxed 3/13/2024 fax# [phone]/paperwork scanned to pt's chart 3/13/2024

## 2024-04-02 ENCOUNTER — TELEPHONE (OUTPATIENT)
Dept: UROLOGY | Facility: CLINIC | Age: 44
End: 2024-04-02

## 2024-04-02 NOTE — TELEPHONE ENCOUNTER
PT DROPPED OFF [employer] FORM TO BE COMPLETED. IT WAS GIVEN TO LM FOR COMPLETION. PT WANTS TO  WHEN COMPLETED. PT WAS ALREADY CHARGED FOR PREVIOUS FORMS SO NO FEE TO SEND TO .    PER PT: THIS FORM IS FOR HER 2 DAY HOSP STAY. SHE HAS OTHER HOSPITAL INSURANCE THAT COVERS THAT.

## 2024-04-08 ENCOUNTER — HOSPITAL ENCOUNTER (OUTPATIENT)
Dept: ULTRASOUND IMAGING | Facility: HOSPITAL | Age: 44
Discharge: HOME | End: 2024-04-08
Payer: COMMERCIAL

## 2024-04-08 DIAGNOSIS — N20.1 URETERAL STONE: ICD-10-CM

## 2024-04-08 PROCEDURE — 76770 US EXAM ABDO BACK WALL COMP: CPT

## 2024-04-08 NOTE — TELEPHONE ENCOUNTER
Pt called to check on the ppw she dropped off last week to see it they are ready for      Pt can be reached at work ph# [phone]

## 2024-04-16 NOTE — PROGRESS NOTES
UROLOGY PROGRESS NOTE   Patient Identifiers: Mone Berumen (MRN [id number])  Date of Service: 4/16/2024    Subjective:   43-year-old female history of kidney stones.  She is seen a hospital in March and had a 5 mm right UVJ stone.  She had stone removal and insertion of the stent.  Recommended 6-week follow-up with ultrasound prior to visit.  Previously she was on Topamax which has stopped.  She admits to being delinquent with water consumption.    Reason for visit: Kidney stone follow-up    Objective:     VITALS:    There were no vitals filed for this visit.      LABS:  Lab Results   Component Value Date    HGB 13.6 03/05/2024    HCT 40.5 03/05/2024    WBC 12.89 (H) 03/05/2024     03/05/2024   ]    Lab Results   Component Value Date    K 4.5 03/16/2024     03/16/2024    CO2 27 03/16/2024    BUN 17 03/16/2024    CREATININE 0.60 03/16/2024    CALCIUM 9.9 03/16/2024   ]        INPATIENT MEDS:    Current Outpatient Medications:     cyclobenzaprine (FLEXERIL) 5 mg tablet, Take 1 tablet (5 mg total) by mouth daily at bedtime as needed for muscle spasms, Disp: 30 tablet, Rfl: 0    diclofenac potassium (CATAFLAM) 50 mg tablet, Take 1 tablet (50 mg total) by mouth 2 (two) times a day for 5 days, Disp: 10 tablet, Rfl: 0    famotidine (PEPCID) 20 mg tablet, Take 20 mg by mouth daily, Disp: , Rfl:     gabapentin (Neurontin) 300 mg capsule, Take 1 capsule (300 mg total) by mouth daily at bedtime as needed (stent colic), Disp: 30 capsule, Rfl: 0    metFORMIN (FORTAMET) 1000 MG (OSM) 24 hr tablet, Take 1,000 mg by mouth daily with breakfast, Disp: , Rfl:     metoclopramide (REGLAN) 5 mg tablet, Take 5 mg by mouth 3 (three) times a day as needed , Disp: , Rfl:     ondansetron (ZOFRAN) 4 mg tablet, Take 1 tablet (4 mg total) by mouth every 6 (six) hours, Disp: 12 tablet, Rfl: 0    oxybutynin (DITROPAN) 5 mg tablet, Take 1 tablet (5 mg total) by mouth 3 (three) times a day for 4 days, Disp: 12 tablet, Rfl: 0     pantoprazole (PROTONIX) 40 mg tablet, , Disp: , Rfl:     tamsulosin (FLOMAX) 0.4 mg, Take 1 capsule (0.4 mg total) by mouth daily with dinner, Disp: 14 capsule, Rfl: 0      Physical Exam:   There were no vitals taken for this visit.  GEN: no acute distress    RESP: breathing comfortably with no accessory muscle use    ABD: soft, non-tender, non-distended   INCISION:    EXT: no significant peripheral edema       RADIOLOGY:   IMPRESSION:     Prior right UVJ calculus is no longer visualized. Bilateral ureteral jets are present. No hydronephrosis.    Assessment:   #1.  Nephrolithiasis    Plan:   -No stones or hydronephrosis on her ultrasound  -She has a regular urologist at [facility] who she will follow with  -As needed for [facility]'s urology  -

## 2024-04-17 ENCOUNTER — OFFICE VISIT (OUTPATIENT)
Dept: UROLOGY | Facility: CLINIC | Age: 44
End: 2024-04-17
Payer: COMMERCIAL

## 2024-04-17 VITALS
HEART RATE: 92 BPM | WEIGHT: 241 LBS | OXYGEN SATURATION: 99 % | SYSTOLIC BLOOD PRESSURE: 120 MMHG | BODY MASS INDEX: 45.5 KG/M2 | DIASTOLIC BLOOD PRESSURE: 85 MMHG | HEIGHT: 61 IN

## 2024-04-17 DIAGNOSIS — N20.0 CALCULUS OF KIDNEY: Primary | ICD-10-CM

## 2024-04-17 PROCEDURE — 99213 OFFICE O/P EST LOW 20 MIN: CPT | Performed by: PHYSICIAN ASSISTANT

## (undated) DEVICE — LASER FIBER HOLMIUM 272MICRON

## (undated) DEVICE — RADIOLOGY STERILE LABELS: Brand: CENTURION

## (undated) DEVICE — CHLORHEXIDINE 4PCT 4 OZ

## (undated) DEVICE — URETERAL CATH 5 FR

## (undated) DEVICE — GLOVE SRG BIOGEL 7.5

## (undated) DEVICE — LUBRICANT SURGILUBE TUBE 4 OZ  FLIP TOP

## (undated) DEVICE — FABRIC REINFORCED, SURGICAL GOWN, XL: Brand: CONVERTORS

## (undated) DEVICE — CYSTO TUBING TUR Y IRRIGATION

## (undated) DEVICE — POUCH UR CATCHER STERILE

## (undated) DEVICE — GUIDEWIRE STRGHT TIP 0.035 IN  SOLO PLUS

## (undated) DEVICE — UROLOGIC DRAIN BAG: Brand: UNBRANDED

## (undated) DEVICE — GUIDEWIRE STRGHT TIP 0.038 IN SOLO PLUS

## (undated) DEVICE — INVIEW CLEAR LEGGINGS: Brand: CONVERTORS

## (undated) DEVICE — BASKET SPECIMEN RETRIVAL 1.9FR 120CM

## (undated) DEVICE — DISPOSABLE OR TOWEL: Brand: CARDINAL HEALTH

## (undated) DEVICE — SHEATH URETERAL ACCESS FLEXOR 12FR 28CM

## (undated) DEVICE — SEAL BIOPSY PORT ADJUST F/ACCESSORIES UP TO 3FR

## (undated) DEVICE — SPECIMEN CONTAINER STERILE PEEL PACK

## (undated) DEVICE — GLOVE SRG BIOGEL ECLIPSE 7.5

## (undated) DEVICE — PACK TUR

## (undated) DEVICE — Device

## (undated) DEVICE — PREMIUM DRY TRAY LF: Brand: MEDLINE INDUSTRIES, INC.

## (undated) DEVICE — CYSTOSCOPY PACK: Brand: CONVERTORS

## (undated) DEVICE — GLOVE INDICATOR PI UNDERGLOVE SZ 8 BLUE

## (undated) DEVICE — URETERAL CATHETER 5 FR POLLACK

## (undated) DEVICE — CATH URETERAL 5FR X 70 CM FLEX TIP POLYUR BARD

## (undated) DEVICE — STERILE LUBRICATING JELLY, TUBE: Brand: HR LUBRICATING JELLY

## (undated) DEVICE — NGAGE, NITINOL STONE EXTRACTOR: Brand: NGAGE

## (undated) DEVICE — SEAL ADJUST BIOPSY PORT Y ADAPTOR

## (undated) DEVICE — SYRINGE 10ML LL CONTROL TOP

## (undated) DEVICE — 3M™ TEGADERM™ TRANSPARENT FILM DRESSING FRAME STYLE, 1624W, 2-3/8 IN X 2-3/4 IN (6 CM X 7 CM), 100/CT 4CT/CASE: Brand: 3M™ TEGADERM™